# Patient Record
Sex: MALE | Race: WHITE | NOT HISPANIC OR LATINO | Employment: OTHER | URBAN - METROPOLITAN AREA
[De-identification: names, ages, dates, MRNs, and addresses within clinical notes are randomized per-mention and may not be internally consistent; named-entity substitution may affect disease eponyms.]

---

## 2020-01-01 ENCOUNTER — HOSPITAL ENCOUNTER (OUTPATIENT)
Dept: RADIATION ONCOLOGY | Facility: HOSPITAL | Age: 60
Discharge: HOME | End: 2020-10-27

## 2020-01-01 ENCOUNTER — TELEPHONE (OUTPATIENT)
Dept: ONCOLOGY | Facility: CLINIC | Age: 60
End: 2020-01-01

## 2020-01-01 ENCOUNTER — APPOINTMENT (OUTPATIENT)
Dept: LAB | Facility: HOSPITAL | Age: 60
End: 2020-01-01

## 2020-01-01 ENCOUNTER — CONSULT (OUTPATIENT)
Dept: RADIATION ONCOLOGY | Facility: HOSPITAL | Age: 60
End: 2020-01-01

## 2020-01-01 ENCOUNTER — OFFICE VISIT (OUTPATIENT)
Dept: ONCOLOGY | Facility: CLINIC | Age: 60
End: 2020-01-01

## 2020-01-01 ENCOUNTER — DOCUMENTATION (OUTPATIENT)
Dept: ONCOLOGY | Facility: CLINIC | Age: 60
End: 2020-01-01

## 2020-01-01 ENCOUNTER — OFFICE VISIT (OUTPATIENT)
Dept: RADIATION ONCOLOGY | Facility: HOSPITAL | Age: 60
End: 2020-01-01

## 2020-01-01 ENCOUNTER — HOSPITAL ENCOUNTER (OUTPATIENT)
Dept: RADIATION ONCOLOGY | Facility: HOSPITAL | Age: 60
Discharge: HOME | End: 2020-11-02

## 2020-01-01 ENCOUNTER — APPOINTMENT (OUTPATIENT)
Dept: GENERAL RADIOLOGY | Facility: HOSPITAL | Age: 60
End: 2020-01-01

## 2020-01-01 ENCOUNTER — APPOINTMENT (OUTPATIENT)
Dept: ONCOLOGY | Facility: HOSPITAL | Age: 60
End: 2020-01-01

## 2020-01-01 ENCOUNTER — ANESTHESIA (OUTPATIENT)
Dept: GASTROENTEROLOGY | Facility: HOSPITAL | Age: 60
End: 2020-01-01

## 2020-01-01 ENCOUNTER — TELEPHONE (OUTPATIENT)
Dept: RADIATION ONCOLOGY | Facility: HOSPITAL | Age: 60
End: 2020-01-01

## 2020-01-01 ENCOUNTER — OFFICE VISIT (OUTPATIENT)
Dept: SURGERY | Facility: CLINIC | Age: 60
End: 2020-01-01

## 2020-01-01 ENCOUNTER — DOCUMENTATION (OUTPATIENT)
Dept: ONCOLOGY | Facility: HOSPITAL | Age: 60
End: 2020-01-01

## 2020-01-01 ENCOUNTER — HOSPITAL ENCOUNTER (OUTPATIENT)
Dept: ONCOLOGY | Facility: HOSPITAL | Age: 60
Setting detail: INFUSION SERIES
Discharge: HOME | End: 2020-12-08

## 2020-01-01 ENCOUNTER — HOSPITAL ENCOUNTER (OUTPATIENT)
Dept: ONCOLOGY | Facility: HOSPITAL | Age: 60
Setting detail: INFUSION SERIES
Discharge: HOME | End: 2020-07-31

## 2020-01-01 ENCOUNTER — HOSPITAL ENCOUNTER (OUTPATIENT)
Dept: RADIATION ONCOLOGY | Facility: HOSPITAL | Age: 60
Discharge: HOME | End: 2020-11-10

## 2020-01-01 ENCOUNTER — HOSPITAL ENCOUNTER (OUTPATIENT)
Dept: ONCOLOGY | Facility: HOSPITAL | Age: 60
Setting detail: INFUSION SERIES
Discharge: HOME | End: 2020-12-16

## 2020-01-01 ENCOUNTER — APPOINTMENT (OUTPATIENT)
Dept: OTHER | Facility: HOSPITAL | Age: 60
End: 2020-01-01

## 2020-01-01 ENCOUNTER — HOSPITAL ENCOUNTER (OUTPATIENT)
Dept: ONCOLOGY | Facility: HOSPITAL | Age: 60
Setting detail: INFUSION SERIES
Discharge: HOME | End: 2020-09-23

## 2020-01-01 ENCOUNTER — HOSPITAL ENCOUNTER (OUTPATIENT)
Dept: RADIATION ONCOLOGY | Facility: HOSPITAL | Age: 60
Discharge: HOME | End: 2020-11-17

## 2020-01-01 ENCOUNTER — HOSPITAL ENCOUNTER (OUTPATIENT)
Facility: HOSPITAL | Age: 60
Setting detail: HOSPITAL OUTPATIENT SURGERY
Discharge: HOME | End: 2020-06-09
Attending: INTERNAL MEDICINE | Admitting: INTERNAL MEDICINE

## 2020-01-01 ENCOUNTER — HOSPITAL ENCOUNTER (OUTPATIENT)
Dept: ONCOLOGY | Facility: HOSPITAL | Age: 60
Setting detail: INFUSION SERIES
Discharge: HOME | End: 2020-07-15

## 2020-01-01 ENCOUNTER — HOSPITAL ENCOUNTER (OUTPATIENT)
Dept: ONCOLOGY | Facility: HOSPITAL | Age: 60
Setting detail: INFUSION SERIES
Discharge: HOME | End: 2020-11-18

## 2020-01-01 ENCOUNTER — HOSPITAL ENCOUNTER (OUTPATIENT)
Dept: MRI IMAGING | Facility: HOSPITAL | Age: 60
Discharge: HOME | End: 2020-05-29
Admitting: INTERNAL MEDICINE

## 2020-01-01 ENCOUNTER — HOSPITAL ENCOUNTER (OUTPATIENT)
Dept: ONCOLOGY | Facility: HOSPITAL | Age: 60
Setting detail: INFUSION SERIES
Discharge: HOME | End: 2020-08-20

## 2020-01-01 ENCOUNTER — HOSPITAL ENCOUNTER (OUTPATIENT)
Dept: ONCOLOGY | Facility: HOSPITAL | Age: 60
Setting detail: INFUSION SERIES
Discharge: HOME | End: 2020-09-03

## 2020-01-01 ENCOUNTER — HOSPITAL ENCOUNTER (OUTPATIENT)
Dept: RADIATION ONCOLOGY | Facility: HOSPITAL | Age: 60
Discharge: HOME | End: 2020-11-09

## 2020-01-01 ENCOUNTER — LAB (OUTPATIENT)
Dept: LAB | Facility: HOSPITAL | Age: 60
End: 2020-01-01

## 2020-01-01 ENCOUNTER — HOSPITAL ENCOUNTER (OUTPATIENT)
Dept: ONCOLOGY | Facility: HOSPITAL | Age: 60
Setting detail: INFUSION SERIES
Discharge: HOME | End: 2020-09-04

## 2020-01-01 ENCOUNTER — CONSULT (OUTPATIENT)
Dept: ONCOLOGY | Facility: CLINIC | Age: 60
End: 2020-01-01

## 2020-01-01 ENCOUNTER — HOSPITAL ENCOUNTER (OUTPATIENT)
Dept: ONCOLOGY | Facility: HOSPITAL | Age: 60
Setting detail: INFUSION SERIES
Discharge: HOME | End: 2020-07-14

## 2020-01-01 ENCOUNTER — HOSPITAL ENCOUNTER (OUTPATIENT)
Dept: PET IMAGING | Facility: HOSPITAL | Age: 60
End: 2020-01-01

## 2020-01-01 ENCOUNTER — ANESTHESIA EVENT (OUTPATIENT)
Dept: PERIOP | Facility: HOSPITAL | Age: 60
End: 2020-01-01

## 2020-01-01 ENCOUNTER — ANESTHESIA (OUTPATIENT)
Dept: PERIOP | Facility: HOSPITAL | Age: 60
End: 2020-01-01

## 2020-01-01 ENCOUNTER — HOSPITAL ENCOUNTER (OUTPATIENT)
Dept: ONCOLOGY | Facility: HOSPITAL | Age: 60
Setting detail: INFUSION SERIES
Discharge: HOME | End: 2020-12-30

## 2020-01-01 ENCOUNTER — HOSPITAL ENCOUNTER (OUTPATIENT)
Dept: ONCOLOGY | Facility: HOSPITAL | Age: 60
Setting detail: INFUSION SERIES
Discharge: HOME | End: 2020-12-09

## 2020-01-01 ENCOUNTER — HOSPITAL ENCOUNTER (OUTPATIENT)
Dept: ONCOLOGY | Facility: HOSPITAL | Age: 60
Setting detail: INFUSION SERIES
Discharge: HOME | End: 2020-09-22

## 2020-01-01 ENCOUNTER — ANESTHESIA EVENT (OUTPATIENT)
Dept: GASTROENTEROLOGY | Facility: HOSPITAL | Age: 60
End: 2020-01-01

## 2020-01-01 ENCOUNTER — HOSPITAL ENCOUNTER (OUTPATIENT)
Dept: ONCOLOGY | Facility: HOSPITAL | Age: 60
Setting detail: INFUSION SERIES
Discharge: HOME | End: 2020-08-21

## 2020-01-01 ENCOUNTER — HOSPITAL ENCOUNTER (OUTPATIENT)
Dept: ONCOLOGY | Facility: HOSPITAL | Age: 60
Setting detail: INFUSION SERIES
Discharge: HOME | End: 2020-07-01

## 2020-01-01 ENCOUNTER — HOSPITAL ENCOUNTER (OUTPATIENT)
Dept: ONCOLOGY | Facility: HOSPITAL | Age: 60
Setting detail: INFUSION SERIES
Discharge: HOME | End: 2020-07-30

## 2020-01-01 ENCOUNTER — HOSPITAL ENCOUNTER (OUTPATIENT)
Dept: RADIATION ONCOLOGY | Facility: HOSPITAL | Age: 60
Discharge: HOME | End: 2020-11-12

## 2020-01-01 ENCOUNTER — HOSPITAL ENCOUNTER (OUTPATIENT)
Dept: RADIATION ONCOLOGY | Facility: HOSPITAL | Age: 60
Discharge: HOME | End: 2020-10-28

## 2020-01-01 ENCOUNTER — TELEPHONE (OUTPATIENT)
Dept: ONCOLOGY | Facility: HOSPITAL | Age: 60
End: 2020-01-01

## 2020-01-01 ENCOUNTER — PREP FOR SURGERY (OUTPATIENT)
Dept: OTHER | Facility: HOSPITAL | Age: 60
End: 2020-01-01

## 2020-01-01 ENCOUNTER — RESULTS ENCOUNTER (OUTPATIENT)
Dept: ONCOLOGY | Facility: CLINIC | Age: 60
End: 2020-01-01

## 2020-01-01 ENCOUNTER — HOSPITAL ENCOUNTER (OUTPATIENT)
Dept: RADIATION ONCOLOGY | Facility: HOSPITAL | Age: 60
Discharge: HOME | End: 2020-11-06

## 2020-01-01 ENCOUNTER — HOSPITAL ENCOUNTER (OUTPATIENT)
Facility: HOSPITAL | Age: 60
Setting detail: HOSPITAL OUTPATIENT SURGERY
Discharge: HOME | End: 2020-06-15
Attending: THORACIC SURGERY (CARDIOTHORACIC VASCULAR SURGERY) | Admitting: THORACIC SURGERY (CARDIOTHORACIC VASCULAR SURGERY)

## 2020-01-01 ENCOUNTER — HOSPITAL ENCOUNTER (OUTPATIENT)
Dept: RADIATION ONCOLOGY | Facility: HOSPITAL | Age: 60
Discharge: HOME | End: 2020-11-11

## 2020-01-01 ENCOUNTER — HOSPITAL ENCOUNTER (OUTPATIENT)
Dept: PET IMAGING | Facility: HOSPITAL | Age: 60
Discharge: HOME | End: 2020-10-19
Admitting: INTERNAL MEDICINE

## 2020-01-01 ENCOUNTER — HOSPITAL ENCOUNTER (OUTPATIENT)
Dept: ONCOLOGY | Facility: HOSPITAL | Age: 60
Setting detail: INFUSION SERIES
End: 2020-01-01

## 2020-01-01 ENCOUNTER — HOSPITAL ENCOUNTER (OUTPATIENT)
Dept: ONCOLOGY | Facility: HOSPITAL | Age: 60
Setting detail: INFUSION SERIES
Discharge: HOME | End: 2020-09-17

## 2020-01-01 ENCOUNTER — HOSPITAL ENCOUNTER (OUTPATIENT)
Dept: RADIATION ONCOLOGY | Facility: HOSPITAL | Age: 60
Discharge: HOME | End: 2020-10-23

## 2020-01-01 ENCOUNTER — HOSPITAL ENCOUNTER (OUTPATIENT)
Dept: RADIATION ONCOLOGY | Facility: HOSPITAL | Age: 60
Setting detail: RADIATION/ONCOLOGY SERIES
End: 2020-01-01

## 2020-01-01 ENCOUNTER — HOSPITAL ENCOUNTER (OUTPATIENT)
Dept: ONCOLOGY | Facility: HOSPITAL | Age: 60
Setting detail: INFUSION SERIES
Discharge: HOME | End: 2020-06-30

## 2020-01-01 ENCOUNTER — HOSPITAL ENCOUNTER (OUTPATIENT)
Dept: RADIATION ONCOLOGY | Facility: HOSPITAL | Age: 60
Discharge: HOME | End: 2020-11-03

## 2020-01-01 ENCOUNTER — HOSPITAL ENCOUNTER (OUTPATIENT)
Dept: RADIATION ONCOLOGY | Facility: HOSPITAL | Age: 60
Discharge: HOME | End: 2020-11-13

## 2020-01-01 ENCOUNTER — HOSPITAL ENCOUNTER (OUTPATIENT)
Dept: RADIATION ONCOLOGY | Facility: HOSPITAL | Age: 60
Discharge: HOME | End: 2020-10-29

## 2020-01-01 ENCOUNTER — HOSPITAL ENCOUNTER (OUTPATIENT)
Dept: PET IMAGING | Facility: HOSPITAL | Age: 60
Discharge: HOME | End: 2020-05-26
Admitting: INTERNAL MEDICINE

## 2020-01-01 ENCOUNTER — APPOINTMENT (OUTPATIENT)
Dept: ONCOLOGY | Facility: CLINIC | Age: 60
End: 2020-01-01

## 2020-01-01 ENCOUNTER — HOSPITAL ENCOUNTER (OUTPATIENT)
Dept: ONCOLOGY | Facility: HOSPITAL | Age: 60
Setting detail: INFUSION SERIES
Discharge: HOME | End: 2020-12-23

## 2020-01-01 ENCOUNTER — HOSPITAL ENCOUNTER (OUTPATIENT)
Dept: CT IMAGING | Facility: HOSPITAL | Age: 60
Discharge: HOME | End: 2020-07-20

## 2020-01-01 ENCOUNTER — HOSPITAL ENCOUNTER (OUTPATIENT)
Dept: RADIATION ONCOLOGY | Facility: HOSPITAL | Age: 60
Discharge: HOME | End: 2020-11-05

## 2020-01-01 VITALS
TEMPERATURE: 97.3 F | DIASTOLIC BLOOD PRESSURE: 75 MMHG | WEIGHT: 168.4 LBS | BODY MASS INDEX: 24.11 KG/M2 | SYSTOLIC BLOOD PRESSURE: 121 MMHG | HEART RATE: 73 BPM | HEIGHT: 70 IN | RESPIRATION RATE: 18 BRPM

## 2020-01-01 VITALS
OXYGEN SATURATION: 99 % | DIASTOLIC BLOOD PRESSURE: 88 MMHG | RESPIRATION RATE: 17 BRPM | TEMPERATURE: 98.6 F | WEIGHT: 181 LBS | SYSTOLIC BLOOD PRESSURE: 127 MMHG | BODY MASS INDEX: 25.91 KG/M2 | HEIGHT: 70 IN | HEART RATE: 70 BPM

## 2020-01-01 VITALS
DIASTOLIC BLOOD PRESSURE: 83 MMHG | OXYGEN SATURATION: 98 % | WEIGHT: 176.3 LBS | BODY MASS INDEX: 25.3 KG/M2 | TEMPERATURE: 98.4 F | RESPIRATION RATE: 16 BRPM | SYSTOLIC BLOOD PRESSURE: 128 MMHG | HEART RATE: 88 BPM

## 2020-01-01 VITALS
HEIGHT: 70 IN | RESPIRATION RATE: 18 BRPM | DIASTOLIC BLOOD PRESSURE: 90 MMHG | WEIGHT: 197.6 LBS | TEMPERATURE: 97.7 F | BODY MASS INDEX: 28.29 KG/M2 | HEART RATE: 91 BPM | SYSTOLIC BLOOD PRESSURE: 165 MMHG | HEIGHT: 70 IN | TEMPERATURE: 97.1 F | WEIGHT: 188.4 LBS | DIASTOLIC BLOOD PRESSURE: 77 MMHG | BODY MASS INDEX: 26.97 KG/M2 | RESPIRATION RATE: 18 BRPM | SYSTOLIC BLOOD PRESSURE: 143 MMHG | HEART RATE: 102 BPM

## 2020-01-01 VITALS
WEIGHT: 128.2 LBS | DIASTOLIC BLOOD PRESSURE: 69 MMHG | HEART RATE: 57 BPM | RESPIRATION RATE: 16 BRPM | SYSTOLIC BLOOD PRESSURE: 145 MMHG | OXYGEN SATURATION: 99 % | HEIGHT: 70 IN | TEMPERATURE: 98 F | TEMPERATURE: 97.3 F | BODY MASS INDEX: 24.34 KG/M2 | WEIGHT: 170 LBS | DIASTOLIC BLOOD PRESSURE: 84 MMHG | RESPIRATION RATE: 18 BRPM | HEIGHT: 70 IN | BODY MASS INDEX: 18.35 KG/M2 | HEART RATE: 106 BPM | SYSTOLIC BLOOD PRESSURE: 95 MMHG

## 2020-01-01 VITALS
WEIGHT: 176.4 LBS | SYSTOLIC BLOOD PRESSURE: 119 MMHG | TEMPERATURE: 98 F | DIASTOLIC BLOOD PRESSURE: 82 MMHG | BODY MASS INDEX: 25.25 KG/M2 | HEIGHT: 70 IN | HEART RATE: 83 BPM

## 2020-01-01 VITALS
HEIGHT: 70 IN | HEART RATE: 57 BPM | SYSTOLIC BLOOD PRESSURE: 124 MMHG | BODY MASS INDEX: 24.31 KG/M2 | DIASTOLIC BLOOD PRESSURE: 79 MMHG | RESPIRATION RATE: 18 BRPM | TEMPERATURE: 99.1 F

## 2020-01-01 VITALS
HEIGHT: 70 IN | TEMPERATURE: 98.7 F | HEART RATE: 85 BPM | RESPIRATION RATE: 18 BRPM | BODY MASS INDEX: 25.65 KG/M2 | DIASTOLIC BLOOD PRESSURE: 78 MMHG | SYSTOLIC BLOOD PRESSURE: 131 MMHG | WEIGHT: 179.2 LBS

## 2020-01-01 VITALS
BODY MASS INDEX: 24.34 KG/M2 | TEMPERATURE: 97.3 F | SYSTOLIC BLOOD PRESSURE: 126 MMHG | DIASTOLIC BLOOD PRESSURE: 81 MMHG | RESPIRATION RATE: 20 BRPM | HEART RATE: 73 BPM | WEIGHT: 170 LBS | HEIGHT: 70 IN

## 2020-01-01 VITALS
BODY MASS INDEX: 17.78 KG/M2 | WEIGHT: 124.2 LBS | HEIGHT: 70 IN | SYSTOLIC BLOOD PRESSURE: 125 MMHG | HEART RATE: 77 BPM | TEMPERATURE: 98.4 F | DIASTOLIC BLOOD PRESSURE: 87 MMHG

## 2020-01-01 VITALS
SYSTOLIC BLOOD PRESSURE: 132 MMHG | HEART RATE: 71 BPM | TEMPERATURE: 98 F | WEIGHT: 174 LBS | DIASTOLIC BLOOD PRESSURE: 81 MMHG | RESPIRATION RATE: 16 BRPM | HEIGHT: 70 IN | BODY MASS INDEX: 24.91 KG/M2

## 2020-01-01 VITALS
DIASTOLIC BLOOD PRESSURE: 67 MMHG | RESPIRATION RATE: 18 BRPM | WEIGHT: 132.3 LBS | HEART RATE: 80 BPM | HEIGHT: 70 IN | TEMPERATURE: 98.2 F | BODY MASS INDEX: 18.94 KG/M2 | OXYGEN SATURATION: 97 % | SYSTOLIC BLOOD PRESSURE: 94 MMHG

## 2020-01-01 VITALS
DIASTOLIC BLOOD PRESSURE: 79 MMHG | HEART RATE: 83 BPM | RESPIRATION RATE: 18 BRPM | TEMPERATURE: 98.2 F | BODY MASS INDEX: 25.77 KG/M2 | HEIGHT: 70 IN | WEIGHT: 180 LBS | SYSTOLIC BLOOD PRESSURE: 128 MMHG

## 2020-01-01 VITALS
TEMPERATURE: 98.2 F | SYSTOLIC BLOOD PRESSURE: 138 MMHG | HEART RATE: 87 BPM | RESPIRATION RATE: 18 BRPM | DIASTOLIC BLOOD PRESSURE: 88 MMHG

## 2020-01-01 VITALS
HEART RATE: 85 BPM | SYSTOLIC BLOOD PRESSURE: 109 MMHG | BODY MASS INDEX: 19.2 KG/M2 | WEIGHT: 133.8 LBS | RESPIRATION RATE: 18 BRPM | TEMPERATURE: 98.9 F | DIASTOLIC BLOOD PRESSURE: 73 MMHG

## 2020-01-01 VITALS
BODY MASS INDEX: 24.11 KG/M2 | DIASTOLIC BLOOD PRESSURE: 87 MMHG | SYSTOLIC BLOOD PRESSURE: 145 MMHG | WEIGHT: 168.4 LBS | HEART RATE: 64 BPM | HEART RATE: 57 BPM | TEMPERATURE: 97.3 F | SYSTOLIC BLOOD PRESSURE: 138 MMHG | RESPIRATION RATE: 18 BRPM | HEIGHT: 70 IN | RESPIRATION RATE: 18 BRPM | HEIGHT: 70 IN | DIASTOLIC BLOOD PRESSURE: 84 MMHG | WEIGHT: 170.7 LBS | BODY MASS INDEX: 24.44 KG/M2 | TEMPERATURE: 97.1 F

## 2020-01-01 VITALS
TEMPERATURE: 98.4 F | WEIGHT: 133.3 LBS | HEART RATE: 94 BPM | BODY MASS INDEX: 19.08 KG/M2 | OXYGEN SATURATION: 97 % | DIASTOLIC BLOOD PRESSURE: 69 MMHG | HEIGHT: 70 IN | SYSTOLIC BLOOD PRESSURE: 106 MMHG | RESPIRATION RATE: 20 BRPM

## 2020-01-01 VITALS
HEART RATE: 90 BPM | SYSTOLIC BLOOD PRESSURE: 104 MMHG | RESPIRATION RATE: 20 BRPM | DIASTOLIC BLOOD PRESSURE: 74 MMHG | HEIGHT: 70 IN | WEIGHT: 135.6 LBS | BODY MASS INDEX: 19.41 KG/M2 | TEMPERATURE: 97.5 F

## 2020-01-01 VITALS
SYSTOLIC BLOOD PRESSURE: 124 MMHG | TEMPERATURE: 97.8 F | RESPIRATION RATE: 16 BRPM | DIASTOLIC BLOOD PRESSURE: 80 MMHG | BODY MASS INDEX: 24.34 KG/M2 | WEIGHT: 170 LBS | HEART RATE: 71 BPM | HEIGHT: 70 IN

## 2020-01-01 VITALS
RESPIRATION RATE: 16 BRPM | SYSTOLIC BLOOD PRESSURE: 101 MMHG | HEART RATE: 86 BPM | WEIGHT: 154 LBS | TEMPERATURE: 99.1 F | DIASTOLIC BLOOD PRESSURE: 59 MMHG | BODY MASS INDEX: 22.05 KG/M2 | HEIGHT: 70 IN

## 2020-01-01 VITALS
HEIGHT: 70 IN | WEIGHT: 172 LBS | TEMPERATURE: 97.7 F | DIASTOLIC BLOOD PRESSURE: 86 MMHG | RESPIRATION RATE: 18 BRPM | BODY MASS INDEX: 24.62 KG/M2 | SYSTOLIC BLOOD PRESSURE: 142 MMHG | HEART RATE: 95 BPM

## 2020-01-01 VITALS
BODY MASS INDEX: 19.04 KG/M2 | DIASTOLIC BLOOD PRESSURE: 69 MMHG | WEIGHT: 133 LBS | TEMPERATURE: 98.4 F | RESPIRATION RATE: 18 BRPM | HEART RATE: 94 BPM | HEIGHT: 70 IN | SYSTOLIC BLOOD PRESSURE: 106 MMHG

## 2020-01-01 VITALS
DIASTOLIC BLOOD PRESSURE: 75 MMHG | OXYGEN SATURATION: 99 % | TEMPERATURE: 98.9 F | BODY MASS INDEX: 22.42 KG/M2 | WEIGHT: 156.6 LBS | SYSTOLIC BLOOD PRESSURE: 112 MMHG | HEART RATE: 80 BPM | HEIGHT: 70 IN

## 2020-01-01 VITALS
TEMPERATURE: 97.5 F | SYSTOLIC BLOOD PRESSURE: 143 MMHG | HEART RATE: 64 BPM | RESPIRATION RATE: 14 BRPM | HEIGHT: 70 IN | OXYGEN SATURATION: 99 % | DIASTOLIC BLOOD PRESSURE: 86 MMHG | BODY MASS INDEX: 24.25 KG/M2 | WEIGHT: 169.4 LBS

## 2020-01-01 VITALS
WEIGHT: 123.6 LBS | DIASTOLIC BLOOD PRESSURE: 68 MMHG | SYSTOLIC BLOOD PRESSURE: 94 MMHG | RESPIRATION RATE: 18 BRPM | TEMPERATURE: 98.4 F | HEART RATE: 83 BPM | BODY MASS INDEX: 17.73 KG/M2

## 2020-01-01 VITALS
WEIGHT: 171 LBS | BODY MASS INDEX: 24.48 KG/M2 | HEIGHT: 70 IN | DIASTOLIC BLOOD PRESSURE: 79 MMHG | TEMPERATURE: 97.3 F | OXYGEN SATURATION: 99 % | SYSTOLIC BLOOD PRESSURE: 111 MMHG | HEART RATE: 93 BPM

## 2020-01-01 VITALS
DIASTOLIC BLOOD PRESSURE: 87 MMHG | OXYGEN SATURATION: 99 % | SYSTOLIC BLOOD PRESSURE: 142 MMHG | WEIGHT: 180.78 LBS | TEMPERATURE: 98.1 F | RESPIRATION RATE: 16 BRPM | HEIGHT: 70 IN | HEART RATE: 73 BPM | BODY MASS INDEX: 25.88 KG/M2

## 2020-01-01 VITALS
SYSTOLIC BLOOD PRESSURE: 165 MMHG | RESPIRATION RATE: 19 BRPM | TEMPERATURE: 97.1 F | HEART RATE: 91 BPM | OXYGEN SATURATION: 98 % | DIASTOLIC BLOOD PRESSURE: 90 MMHG | BODY MASS INDEX: 28.2 KG/M2 | WEIGHT: 197 LBS | HEIGHT: 70 IN

## 2020-01-01 VITALS
TEMPERATURE: 98.7 F | DIASTOLIC BLOOD PRESSURE: 78 MMHG | HEART RATE: 80 BPM | BODY MASS INDEX: 25.3 KG/M2 | RESPIRATION RATE: 18 BRPM | HEIGHT: 70 IN | SYSTOLIC BLOOD PRESSURE: 126 MMHG

## 2020-01-01 VITALS
DIASTOLIC BLOOD PRESSURE: 60 MMHG | RESPIRATION RATE: 18 BRPM | SYSTOLIC BLOOD PRESSURE: 95 MMHG | HEIGHT: 70 IN | BODY MASS INDEX: 20.7 KG/M2 | HEART RATE: 84 BPM | WEIGHT: 144.6 LBS | TEMPERATURE: 98.9 F

## 2020-01-01 VITALS
HEART RATE: 66 BPM | SYSTOLIC BLOOD PRESSURE: 133 MMHG | TEMPERATURE: 97.8 F | DIASTOLIC BLOOD PRESSURE: 80 MMHG | HEIGHT: 70 IN | BODY MASS INDEX: 24.16 KG/M2 | RESPIRATION RATE: 18 BRPM

## 2020-01-01 VITALS
TEMPERATURE: 97.1 F | HEIGHT: 70 IN | RESPIRATION RATE: 18 BRPM | SYSTOLIC BLOOD PRESSURE: 96 MMHG | WEIGHT: 123 LBS | DIASTOLIC BLOOD PRESSURE: 56 MMHG | BODY MASS INDEX: 17.61 KG/M2

## 2020-01-01 VITALS
SYSTOLIC BLOOD PRESSURE: 131 MMHG | BODY MASS INDEX: 28.27 KG/M2 | OXYGEN SATURATION: 97 % | HEIGHT: 70 IN | HEART RATE: 103 BPM | DIASTOLIC BLOOD PRESSURE: 81 MMHG

## 2020-01-01 VITALS
SYSTOLIC BLOOD PRESSURE: 127 MMHG | HEIGHT: 70 IN | WEIGHT: 137 LBS | RESPIRATION RATE: 18 BRPM | TEMPERATURE: 98.1 F | DIASTOLIC BLOOD PRESSURE: 97 MMHG | HEART RATE: 107 BPM | BODY MASS INDEX: 19.61 KG/M2

## 2020-01-01 DIAGNOSIS — R59.9 ADENOPATHY: ICD-10-CM

## 2020-01-01 DIAGNOSIS — I82.90 BLOOD CLOT IN VEIN: ICD-10-CM

## 2020-01-01 DIAGNOSIS — C15.9 MALIGNANT NEOPLASM OF ESOPHAGUS, UNSPECIFIED LOCATION (HCC): ICD-10-CM

## 2020-01-01 DIAGNOSIS — C15.5 MALIGNANT NEOPLASM OF LOWER THIRD OF ESOPHAGUS (HCC): Primary | ICD-10-CM

## 2020-01-01 DIAGNOSIS — D50.0 IRON DEFICIENCY ANEMIA DUE TO CHRONIC BLOOD LOSS: ICD-10-CM

## 2020-01-01 DIAGNOSIS — C15.5 MALIGNANT NEOPLASM OF LOWER THIRD OF ESOPHAGUS (HCC): ICD-10-CM

## 2020-01-01 DIAGNOSIS — C16.9 GASTRIC CARCINOMA (HCC): ICD-10-CM

## 2020-01-01 DIAGNOSIS — Z45.2 ENCOUNTER FOR FITTING AND ADJUSTMENT OF VASCULAR CATHETER: ICD-10-CM

## 2020-01-01 DIAGNOSIS — E86.0 DEHYDRATION: Primary | ICD-10-CM

## 2020-01-01 DIAGNOSIS — Z00.6 EXAMINATION FOR NORMAL COMPARISON OR CONTROL IN CLINICAL RESEARCH: ICD-10-CM

## 2020-01-01 DIAGNOSIS — C15.9 MALIGNANT NEOPLASM OF ESOPHAGUS, UNSPECIFIED LOCATION (HCC): Primary | ICD-10-CM

## 2020-01-01 DIAGNOSIS — T50.905A ADVERSE EFFECT OF DRUG, INITIAL ENCOUNTER: Primary | ICD-10-CM

## 2020-01-01 DIAGNOSIS — R11.2 CHEMOTHERAPY-INDUCED NAUSEA AND VOMITING: Primary | ICD-10-CM

## 2020-01-01 DIAGNOSIS — I10 ESSENTIAL HYPERTENSION: ICD-10-CM

## 2020-01-01 DIAGNOSIS — K21.00 GASTROESOPHAGEAL REFLUX DISEASE WITH ESOPHAGITIS WITHOUT HEMORRHAGE: Primary | ICD-10-CM

## 2020-01-01 DIAGNOSIS — D50.9 IRON DEFICIENCY ANEMIA, UNSPECIFIED IRON DEFICIENCY ANEMIA TYPE: ICD-10-CM

## 2020-01-01 DIAGNOSIS — R63.4 WEIGHT LOSS: ICD-10-CM

## 2020-01-01 DIAGNOSIS — R80.9 PROTEINURIA, UNSPECIFIED TYPE: Primary | ICD-10-CM

## 2020-01-01 DIAGNOSIS — C16.9 GASTRIC CARCINOMA (HCC): Primary | ICD-10-CM

## 2020-01-01 DIAGNOSIS — R63.4 WEIGHT LOSS: Primary | ICD-10-CM

## 2020-01-01 DIAGNOSIS — I26.99 PULMONARY EMBOLISM WITHOUT ACUTE COR PULMONALE, UNSPECIFIED CHRONICITY, UNSPECIFIED PULMONARY EMBOLISM TYPE (HCC): ICD-10-CM

## 2020-01-01 DIAGNOSIS — I82.90 BLOOD CLOT IN VEIN: Primary | ICD-10-CM

## 2020-01-01 DIAGNOSIS — K90.9 INTESTINAL MALABSORPTION, UNSPECIFIED TYPE: ICD-10-CM

## 2020-01-01 DIAGNOSIS — Z45.2 ENCOUNTER FOR FITTING AND ADJUSTMENT OF VASCULAR CATHETER: Primary | ICD-10-CM

## 2020-01-01 DIAGNOSIS — R11.2 NAUSEA AND VOMITING, INTRACTABILITY OF VOMITING NOT SPECIFIED, UNSPECIFIED VOMITING TYPE: Primary | ICD-10-CM

## 2020-01-01 DIAGNOSIS — R11.2 NAUSEA AND VOMITING, INTRACTABILITY OF VOMITING NOT SPECIFIED, UNSPECIFIED VOMITING TYPE: ICD-10-CM

## 2020-01-01 DIAGNOSIS — T45.1X5A CHEMOTHERAPY-INDUCED NAUSEA AND VOMITING: Primary | ICD-10-CM

## 2020-01-01 LAB
ALBUMIN SERPL-MCNC: 3.4 G/DL (ref 3.5–5.2)
ALBUMIN SERPL-MCNC: 3.5 G/DL (ref 3.5–5.2)
ALBUMIN SERPL-MCNC: 3.6 G/DL (ref 3.5–5.2)
ALBUMIN SERPL-MCNC: 3.7 G/DL (ref 3.5–5.2)
ALBUMIN SERPL-MCNC: 3.8 G/DL (ref 3.5–5.2)
ALBUMIN SERPL-MCNC: 3.8 G/DL (ref 3.5–5.2)
ALBUMIN SERPL-MCNC: 3.9 G/DL (ref 3.5–5.2)
ALBUMIN SERPL-MCNC: 4.1 G/DL (ref 3.5–5.2)
ALBUMIN SERPL-MCNC: 4.1 G/DL (ref 3.5–5.2)
ALBUMIN SERPL-MCNC: 4.2 G/DL (ref 3.5–5.2)
ALBUMIN/GLOB SERPL: 1.1 G/DL
ALBUMIN/GLOB SERPL: 1.2 G/DL
ALBUMIN/GLOB SERPL: 1.3 G/DL
ALBUMIN/GLOB SERPL: 1.3 G/DL
ALBUMIN/GLOB SERPL: 1.4 G/DL
ALBUMIN/GLOB SERPL: 1.5 G/DL
ALP SERPL-CCNC: 74 U/L (ref 39–117)
ALP SERPL-CCNC: 81 U/L (ref 39–117)
ALP SERPL-CCNC: 82 U/L (ref 39–117)
ALP SERPL-CCNC: 83 U/L (ref 39–117)
ALP SERPL-CCNC: 84 U/L (ref 39–117)
ALP SERPL-CCNC: 85 U/L (ref 39–117)
ALP SERPL-CCNC: 91 U/L (ref 39–117)
ALP SERPL-CCNC: 92 U/L (ref 39–117)
ALP SERPL-CCNC: 92 U/L (ref 39–117)
ALP SERPL-CCNC: 97 U/L (ref 39–117)
ALT SERPL W P-5'-P-CCNC: 10 U/L (ref 1–41)
ALT SERPL W P-5'-P-CCNC: 10 U/L (ref 1–41)
ALT SERPL W P-5'-P-CCNC: 15 U/L (ref 1–41)
ALT SERPL W P-5'-P-CCNC: 16 U/L (ref 1–41)
ALT SERPL W P-5'-P-CCNC: 17 U/L (ref 1–41)
ALT SERPL W P-5'-P-CCNC: 18 U/L (ref 1–41)
ALT SERPL W P-5'-P-CCNC: 18 U/L (ref 1–41)
ALT SERPL W P-5'-P-CCNC: 20 U/L (ref 1–41)
ALT SERPL W P-5'-P-CCNC: 22 U/L (ref 1–41)
ALT SERPL W P-5'-P-CCNC: 28 U/L (ref 1–41)
ANION GAP SERPL CALCULATED.3IONS-SCNC: 10 MMOL/L (ref 5–15)
ANION GAP SERPL CALCULATED.3IONS-SCNC: 11 MMOL/L (ref 5–15)
ANION GAP SERPL CALCULATED.3IONS-SCNC: 11 MMOL/L (ref 5–15)
ANION GAP SERPL CALCULATED.3IONS-SCNC: 12 MMOL/L (ref 5–15)
ANION GAP SERPL CALCULATED.3IONS-SCNC: 12 MMOL/L (ref 5–15)
ANION GAP SERPL CALCULATED.3IONS-SCNC: 13 MMOL/L (ref 5–15)
ANION GAP SERPL CALCULATED.3IONS-SCNC: 14 MMOL/L (ref 5–15)
ANION GAP SERPL CALCULATED.3IONS-SCNC: 14 MMOL/L (ref 5–15)
AST SERPL-CCNC: 15 U/L (ref 1–40)
AST SERPL-CCNC: 16 U/L (ref 1–40)
AST SERPL-CCNC: 18 U/L (ref 1–40)
AST SERPL-CCNC: 19 U/L (ref 1–40)
AST SERPL-CCNC: 20 U/L (ref 1–40)
AST SERPL-CCNC: 21 U/L (ref 1–40)
AST SERPL-CCNC: 23 U/L (ref 1–40)
AST SERPL-CCNC: 27 U/L (ref 1–40)
AST SERPL-CCNC: 28 U/L (ref 1–40)
AST SERPL-CCNC: 29 U/L (ref 1–40)
BASOPHILS # BLD AUTO: 0 10*3/MM3 (ref 0–0.2)
BASOPHILS # BLD AUTO: 0 10*3/MM3 (ref 0–0.2)
BASOPHILS # BLD AUTO: 0.01 10*3/MM3 (ref 0–0.2)
BASOPHILS # BLD AUTO: 0.02 10*3/MM3 (ref 0–0.2)
BASOPHILS # BLD AUTO: 0.03 10*3/MM3 (ref 0–0.2)
BASOPHILS # BLD AUTO: 0.04 10*3/MM3 (ref 0–0.2)
BASOPHILS # BLD AUTO: 0.04 10*3/MM3 (ref 0–0.2)
BASOPHILS NFR BLD AUTO: 0 % (ref 0–1.5)
BASOPHILS NFR BLD AUTO: 0 % (ref 0–1.5)
BASOPHILS NFR BLD AUTO: 0.1 % (ref 0–1.5)
BASOPHILS NFR BLD AUTO: 0.1 % (ref 0–1.5)
BASOPHILS NFR BLD AUTO: 0.2 % (ref 0–1.5)
BASOPHILS NFR BLD AUTO: 0.2 % (ref 0–1.5)
BASOPHILS NFR BLD AUTO: 0.4 % (ref 0–1.5)
BASOPHILS NFR BLD AUTO: 0.4 % (ref 0–1.5)
BASOPHILS NFR BLD AUTO: 0.5 % (ref 0–1.5)
BASOPHILS NFR BLD AUTO: 0.5 % (ref 0–1.5)
BASOPHILS NFR BLD AUTO: 0.7 % (ref 0–1.5)
BASOPHILS NFR BLD AUTO: 0.8 % (ref 0–1.5)
BASOPHILS NFR BLD AUTO: 0.8 % (ref 0–1.5)
BASOPHILS NFR BLD AUTO: 1.4 % (ref 0–1.5)
BASOPHILS NFR BLD AUTO: 1.6 % (ref 0–1.5)
BASOPHILS NFR BLD AUTO: 2.9 % (ref 0–1.5)
BILIRUB SERPL-MCNC: 0.3 MG/DL (ref 0.2–1.2)
BILIRUB SERPL-MCNC: 0.3 MG/DL (ref 0.2–1.2)
BILIRUB SERPL-MCNC: 0.3 MG/DL (ref 0–1.2)
BILIRUB SERPL-MCNC: 0.4 MG/DL (ref 0–1.2)
BILIRUB SERPL-MCNC: 0.5 MG/DL (ref 0–1.2)
BILIRUB UR QL STRIP: ABNORMAL
BUN BLD-MCNC: 11 MG/DL (ref 8–23)
BUN BLD-MCNC: 20 MG/DL (ref 8–23)
BUN BLD-MCNC: ABNORMAL MG/DL
BUN SERPL-MCNC: 10 MG/DL (ref 8–23)
BUN SERPL-MCNC: 12 MG/DL (ref 8–23)
BUN SERPL-MCNC: 13 MG/DL (ref 8–23)
BUN SERPL-MCNC: 14 MG/DL (ref 8–23)
BUN SERPL-MCNC: 15 MG/DL (ref 8–23)
BUN SERPL-MCNC: 26 MG/DL (ref 8–23)
BUN SERPL-MCNC: ABNORMAL MG/DL
BUN/CREAT SERPL: 14.3 (ref 7–25)
BUN/CREAT SERPL: 20.4 (ref 7–25)
BUN/CREAT SERPL: ABNORMAL
CALCIUM SPEC-SCNC: 9.1 MG/DL (ref 8.6–10.5)
CALCIUM SPEC-SCNC: 9.1 MG/DL (ref 8.6–10.5)
CALCIUM SPEC-SCNC: 9.2 MG/DL (ref 8.6–10.5)
CALCIUM SPEC-SCNC: 9.3 MG/DL (ref 8.6–10.5)
CALCIUM SPEC-SCNC: 9.4 MG/DL (ref 8.6–10.5)
CALCIUM SPEC-SCNC: 9.4 MG/DL (ref 8.6–10.5)
CALCIUM SPEC-SCNC: 9.5 MG/DL (ref 8.6–10.5)
CALCIUM SPEC-SCNC: 9.6 MG/DL (ref 8.6–10.5)
CALCIUM SPEC-SCNC: 9.7 MG/DL (ref 8.6–10.5)
CALCIUM SPEC-SCNC: 9.8 MG/DL (ref 8.6–10.5)
CALCIUM SPEC-SCNC: 9.9 MG/DL (ref 8.6–10.5)
CANCER AG19-9 SERPL-ACNC: 207.5 U/ML
CANCER AG19-9 SERPL-ACNC: 628.5 U/ML
CANCER AG19-9 SERPL-ACNC: 691.8 U/ML
CANCER AG19-9 SERPL-ACNC: 797.6 U/ML
CEA SERPL-MCNC: 13.3 NG/ML
CEA SERPL-MCNC: 15.1 NG/ML
CEA SERPL-MCNC: 16.4 NG/ML
CEA SERPL-MCNC: 20.6 NG/ML
CEA SERPL-MCNC: 4.44 NG/ML
CHLORIDE SERPL-SCNC: 100 MMOL/L (ref 98–107)
CHLORIDE SERPL-SCNC: 101 MMOL/L (ref 98–107)
CHLORIDE SERPL-SCNC: 102 MMOL/L (ref 98–107)
CHLORIDE SERPL-SCNC: 102 MMOL/L (ref 98–107)
CHLORIDE SERPL-SCNC: 103 MMOL/L (ref 98–107)
CHLORIDE SERPL-SCNC: 103 MMOL/L (ref 98–107)
CHLORIDE SERPL-SCNC: 104 MMOL/L (ref 98–107)
CHLORIDE SERPL-SCNC: 98 MMOL/L (ref 98–107)
CHLORIDE SERPL-SCNC: 99 MMOL/L (ref 98–107)
CLARITY UR: ABNORMAL
CO2 SERPL-SCNC: 21 MMOL/L (ref 22–29)
CO2 SERPL-SCNC: 22 MMOL/L (ref 22–29)
CO2 SERPL-SCNC: 24 MMOL/L (ref 22–29)
CO2 SERPL-SCNC: 25 MMOL/L (ref 22–29)
CO2 SERPL-SCNC: 25 MMOL/L (ref 22–29)
COLOR UR: ABNORMAL
CREAT BLD-MCNC: 0.83 MG/DL (ref 0.76–1.27)
CREAT BLD-MCNC: 0.98 MG/DL (ref 0.76–1.27)
CREAT SERPL-MCNC: 0.78 MG/DL (ref 0.76–1.27)
CREAT SERPL-MCNC: 0.8 MG/DL (ref 0.76–1.27)
CREAT SERPL-MCNC: 0.8 MG/DL (ref 0.76–1.27)
CREAT SERPL-MCNC: 0.81 MG/DL (ref 0.76–1.27)
CREAT SERPL-MCNC: 0.82 MG/DL (ref 0.76–1.27)
CREAT SERPL-MCNC: 0.86 MG/DL (ref 0.76–1.27)
CREAT SERPL-MCNC: 0.91 MG/DL (ref 0.76–1.27)
CREAT SERPL-MCNC: 1 MG/DL (ref 0.76–1.27)
CREAT SERPL-MCNC: 1.11 MG/DL (ref 0.76–1.27)
D DIMER PPP FEU-MCNC: 3.28 MCGFEU/ML (ref 0.17–0.59)
DEPRECATED RDW RBC AUTO: 40.3 FL (ref 37–54)
DEPRECATED RDW RBC AUTO: 40.7 FL (ref 37–54)
DEPRECATED RDW RBC AUTO: 40.9 FL (ref 37–54)
DEPRECATED RDW RBC AUTO: 41.7 FL (ref 37–54)
DEPRECATED RDW RBC AUTO: 41.9 FL (ref 37–54)
DEPRECATED RDW RBC AUTO: 42.1 FL (ref 37–54)
DEPRECATED RDW RBC AUTO: 42.5 FL (ref 37–54)
DEPRECATED RDW RBC AUTO: 42.7 FL (ref 37–54)
DEPRECATED RDW RBC AUTO: 42.9 FL (ref 37–54)
DEPRECATED RDW RBC AUTO: 43 FL (ref 37–54)
DEPRECATED RDW RBC AUTO: 44.8 FL (ref 37–54)
DEPRECATED RDW RBC AUTO: 46.6 FL (ref 37–54)
DEPRECATED RDW RBC AUTO: 48.2 FL (ref 37–54)
DEPRECATED RDW RBC AUTO: 48.5 FL (ref 37–54)
DEPRECATED RDW RBC AUTO: 49.6 FL (ref 37–54)
DEPRECATED RDW RBC AUTO: 50.5 FL (ref 37–54)
DEPRECATED RDW RBC AUTO: 52.1 FL (ref 37–54)
EOSINOPHIL # BLD AUTO: 0 10*3/MM3 (ref 0–0.4)
EOSINOPHIL # BLD AUTO: 0.01 10*3/MM3 (ref 0–0.4)
EOSINOPHIL # BLD AUTO: 0.02 10*3/MM3 (ref 0–0.4)
EOSINOPHIL # BLD AUTO: 0.02 10*3/MM3 (ref 0–0.4)
EOSINOPHIL # BLD AUTO: 0.04 10*3/MM3 (ref 0–0.4)
EOSINOPHIL # BLD AUTO: 0.05 10*3/MM3 (ref 0–0.4)
EOSINOPHIL # BLD AUTO: 0.06 10*3/MM3 (ref 0–0.4)
EOSINOPHIL # BLD AUTO: 0.07 10*3/MM3 (ref 0–0.4)
EOSINOPHIL NFR BLD AUTO: 0 % (ref 0.3–6.2)
EOSINOPHIL NFR BLD AUTO: 0.2 % (ref 0.3–6.2)
EOSINOPHIL NFR BLD AUTO: 0.3 % (ref 0.3–6.2)
EOSINOPHIL NFR BLD AUTO: 0.4 % (ref 0.3–6.2)
EOSINOPHIL NFR BLD AUTO: 0.6 % (ref 0.3–6.2)
EOSINOPHIL NFR BLD AUTO: 0.8 % (ref 0.3–6.2)
EOSINOPHIL NFR BLD AUTO: 1 % (ref 0.3–6.2)
EOSINOPHIL NFR BLD AUTO: 1.1 % (ref 0.3–6.2)
EOSINOPHIL NFR BLD AUTO: 1.4 % (ref 0.3–6.2)
EOSINOPHIL NFR BLD AUTO: 1.8 % (ref 0.3–6.2)
ERYTHROCYTE [DISTWIDTH] IN BLOOD BY AUTOMATED COUNT: 13 % (ref 12.3–15.4)
ERYTHROCYTE [DISTWIDTH] IN BLOOD BY AUTOMATED COUNT: 13.4 % (ref 12.3–15.4)
ERYTHROCYTE [DISTWIDTH] IN BLOOD BY AUTOMATED COUNT: 13.5 % (ref 12.3–15.4)
ERYTHROCYTE [DISTWIDTH] IN BLOOD BY AUTOMATED COUNT: 13.5 % (ref 12.3–15.4)
ERYTHROCYTE [DISTWIDTH] IN BLOOD BY AUTOMATED COUNT: 13.6 % (ref 12.3–15.4)
ERYTHROCYTE [DISTWIDTH] IN BLOOD BY AUTOMATED COUNT: 13.6 % (ref 12.3–15.4)
ERYTHROCYTE [DISTWIDTH] IN BLOOD BY AUTOMATED COUNT: 13.7 % (ref 12.3–15.4)
ERYTHROCYTE [DISTWIDTH] IN BLOOD BY AUTOMATED COUNT: 13.7 % (ref 12.3–15.4)
ERYTHROCYTE [DISTWIDTH] IN BLOOD BY AUTOMATED COUNT: 13.9 % (ref 12.3–15.4)
ERYTHROCYTE [DISTWIDTH] IN BLOOD BY AUTOMATED COUNT: 14.2 % (ref 12.3–15.4)
ERYTHROCYTE [DISTWIDTH] IN BLOOD BY AUTOMATED COUNT: 14.3 % (ref 12.3–15.4)
ERYTHROCYTE [DISTWIDTH] IN BLOOD BY AUTOMATED COUNT: 15.1 % (ref 12.3–15.4)
ERYTHROCYTE [DISTWIDTH] IN BLOOD BY AUTOMATED COUNT: 15.6 % (ref 12.3–15.4)
ERYTHROCYTE [DISTWIDTH] IN BLOOD BY AUTOMATED COUNT: 15.8 % (ref 12.3–15.4)
ERYTHROCYTE [DISTWIDTH] IN BLOOD BY AUTOMATED COUNT: 16.5 % (ref 12.3–15.4)
ERYTHROCYTE [DISTWIDTH] IN BLOOD BY AUTOMATED COUNT: 16.7 % (ref 12.3–15.4)
ERYTHROCYTE [DISTWIDTH] IN BLOOD BY AUTOMATED COUNT: 16.8 % (ref 12.3–15.4)
FERRITIN SERPL-MCNC: 104.7 NG/ML (ref 30–400)
GFR SERPL CREATININE-BSD FRML MDRD: 102 ML/MIN/1.73
GFR SERPL CREATININE-BSD FRML MDRD: 68 ML/MIN/1.73
GFR SERPL CREATININE-BSD FRML MDRD: 76 ML/MIN/1.73
GFR SERPL CREATININE-BSD FRML MDRD: 78 ML/MIN/1.73
GFR SERPL CREATININE-BSD FRML MDRD: 85 ML/MIN/1.73
GFR SERPL CREATININE-BSD FRML MDRD: 91 ML/MIN/1.73
GFR SERPL CREATININE-BSD FRML MDRD: 95 ML/MIN/1.73
GFR SERPL CREATININE-BSD FRML MDRD: 96 ML/MIN/1.73
GFR SERPL CREATININE-BSD FRML MDRD: 97 ML/MIN/1.73
GFR SERPL CREATININE-BSD FRML MDRD: 99 ML/MIN/1.73
GFR SERPL CREATININE-BSD FRML MDRD: 99 ML/MIN/1.73
GLOBULIN UR ELPH-MCNC: 2.3 GM/DL
GLOBULIN UR ELPH-MCNC: 2.5 GM/DL
GLOBULIN UR ELPH-MCNC: 2.6 GM/DL
GLOBULIN UR ELPH-MCNC: 2.6 GM/DL
GLOBULIN UR ELPH-MCNC: 2.7 GM/DL
GLOBULIN UR ELPH-MCNC: 2.8 GM/DL
GLOBULIN UR ELPH-MCNC: 3.1 GM/DL
GLOBULIN UR ELPH-MCNC: 3.2 GM/DL
GLOBULIN UR ELPH-MCNC: 3.3 GM/DL
GLOBULIN UR ELPH-MCNC: 3.7 GM/DL
GLUCOSE BLD-MCNC: 117 MG/DL (ref 65–99)
GLUCOSE BLD-MCNC: 132 MG/DL (ref 65–99)
GLUCOSE BLDC GLUCOMTR-MCNC: 117 MG/DL (ref 70–105)
GLUCOSE SERPL-MCNC: 106 MG/DL (ref 65–99)
GLUCOSE SERPL-MCNC: 141 MG/DL (ref 65–99)
GLUCOSE SERPL-MCNC: 166 MG/DL (ref 65–99)
GLUCOSE SERPL-MCNC: 170 MG/DL (ref 65–99)
GLUCOSE SERPL-MCNC: 170 MG/DL (ref 65–99)
GLUCOSE SERPL-MCNC: 180 MG/DL (ref 65–99)
GLUCOSE SERPL-MCNC: 203 MG/DL (ref 65–99)
GLUCOSE SERPL-MCNC: 222 MG/DL (ref 65–99)
GLUCOSE SERPL-MCNC: 230 MG/DL (ref 65–99)
GLUCOSE UR STRIP-MCNC: NEGATIVE MG/DL
HCT VFR BLD AUTO: 32.4 % (ref 37.5–51)
HCT VFR BLD AUTO: 32.8 % (ref 37.5–51)
HCT VFR BLD AUTO: 34 % (ref 37.5–51)
HCT VFR BLD AUTO: 34.1 % (ref 37.5–51)
HCT VFR BLD AUTO: 34.5 % (ref 37.5–51)
HCT VFR BLD AUTO: 34.6 % (ref 37.5–51)
HCT VFR BLD AUTO: 34.8 % (ref 37.5–51)
HCT VFR BLD AUTO: 35.1 % (ref 37.5–51)
HCT VFR BLD AUTO: 35.5 % (ref 37.5–51)
HCT VFR BLD AUTO: 35.9 % (ref 37.5–51)
HCT VFR BLD AUTO: 37 % (ref 37.5–51)
HCT VFR BLD AUTO: 37.8 % (ref 37.5–51)
HCT VFR BLD AUTO: 37.9 % (ref 37.5–51)
HCT VFR BLD AUTO: 38 % (ref 37.5–51)
HCT VFR BLD AUTO: 38.9 % (ref 37.5–51)
HCT VFR BLD AUTO: 39.6 % (ref 37.5–51)
HCT VFR BLD AUTO: 40.3 % (ref 37.5–51)
HGB BLD-MCNC: 11.3 G/DL (ref 13–17.7)
HGB BLD-MCNC: 11.4 G/DL (ref 13–17.7)
HGB BLD-MCNC: 11.5 G/DL (ref 13–17.7)
HGB BLD-MCNC: 11.6 G/DL (ref 13–17.7)
HGB BLD-MCNC: 11.8 G/DL (ref 13–17.7)
HGB BLD-MCNC: 11.8 G/DL (ref 13–17.7)
HGB BLD-MCNC: 11.9 G/DL (ref 13–17.7)
HGB BLD-MCNC: 11.9 G/DL (ref 13–17.7)
HGB BLD-MCNC: 12.2 G/DL (ref 13–17.7)
HGB BLD-MCNC: 12.3 G/DL (ref 13–17.7)
HGB BLD-MCNC: 12.5 G/DL (ref 13–17.7)
HGB BLD-MCNC: 12.8 G/DL (ref 13–17.7)
HGB BLD-MCNC: 13 G/DL (ref 13–17.7)
HGB BLD-MCNC: 13 G/DL (ref 13–17.7)
HGB BLD-MCNC: 13.1 G/DL (ref 13–17.7)
HGB BLD-MCNC: 13.4 G/DL (ref 13–17.7)
HGB BLD-MCNC: 13.6 G/DL (ref 13–17.7)
HGB UR QL STRIP.AUTO: ABNORMAL
HGB UR QL STRIP.AUTO: NEGATIVE
HGB UR QL STRIP.AUTO: NEGATIVE
IRON 24H UR-MRATE: 31 MCG/DL (ref 59–158)
IRON SATN MFR SERPL: 9 % (ref 20–50)
KETONES UR QL STRIP: ABNORMAL
LEUKOCYTE ESTERASE UR QL STRIP.AUTO: NEGATIVE
LYMPHOCYTES # BLD AUTO: 0.16 10*3/MM3 (ref 0.7–3.1)
LYMPHOCYTES # BLD AUTO: 0.2 10*3/MM3 (ref 0.7–3.1)
LYMPHOCYTES # BLD AUTO: 0.46 10*3/MM3 (ref 0.7–3.1)
LYMPHOCYTES # BLD AUTO: 0.47 10*3/MM3 (ref 0.7–3.1)
LYMPHOCYTES # BLD AUTO: 0.51 10*3/MM3 (ref 0.7–3.1)
LYMPHOCYTES # BLD AUTO: 0.53 10*3/MM3 (ref 0.7–3.1)
LYMPHOCYTES # BLD AUTO: 0.54 10*3/MM3 (ref 0.7–3.1)
LYMPHOCYTES # BLD AUTO: 0.54 10*3/MM3 (ref 0.7–3.1)
LYMPHOCYTES # BLD AUTO: 0.62 10*3/MM3 (ref 0.7–3.1)
LYMPHOCYTES # BLD AUTO: 0.74 10*3/MM3 (ref 0.7–3.1)
LYMPHOCYTES # BLD AUTO: 0.78 10*3/MM3 (ref 0.7–3.1)
LYMPHOCYTES # BLD AUTO: 0.89 10*3/MM3 (ref 0.7–3.1)
LYMPHOCYTES # BLD AUTO: 1.24 10*3/MM3 (ref 0.7–3.1)
LYMPHOCYTES # BLD AUTO: 1.35 10*3/MM3 (ref 0.7–3.1)
LYMPHOCYTES # BLD AUTO: 1.36 10*3/MM3 (ref 0.7–3.1)
LYMPHOCYTES # BLD AUTO: 1.39 10*3/MM3 (ref 0.7–3.1)
LYMPHOCYTES NFR BLD AUTO: 10 % (ref 19.6–45.3)
LYMPHOCYTES NFR BLD AUTO: 11 % (ref 19.6–45.3)
LYMPHOCYTES NFR BLD AUTO: 12.9 % (ref 19.6–45.3)
LYMPHOCYTES NFR BLD AUTO: 18.8 % (ref 19.6–45.3)
LYMPHOCYTES NFR BLD AUTO: 19 % (ref 19.6–45.3)
LYMPHOCYTES NFR BLD AUTO: 20.1 % (ref 19.6–45.3)
LYMPHOCYTES NFR BLD AUTO: 20.2 % (ref 19.6–45.3)
LYMPHOCYTES NFR BLD AUTO: 25.5 % (ref 19.6–45.3)
LYMPHOCYTES NFR BLD AUTO: 26.3 % (ref 19.6–45.3)
LYMPHOCYTES NFR BLD AUTO: 29.6 % (ref 19.6–45.3)
LYMPHOCYTES NFR BLD AUTO: 31.1 % (ref 19.6–45.3)
LYMPHOCYTES NFR BLD AUTO: 54.3 % (ref 19.6–45.3)
LYMPHOCYTES NFR BLD AUTO: 7.9 % (ref 19.6–45.3)
LYMPHOCYTES NFR BLD AUTO: 8.6 % (ref 19.6–45.3)
LYMPHOCYTES NFR BLD AUTO: 9.7 % (ref 19.6–45.3)
LYMPHOCYTES NFR BLD AUTO: 9.9 % (ref 19.6–45.3)
MCH RBC QN AUTO: 28.4 PG (ref 26.6–33)
MCH RBC QN AUTO: 28.5 PG (ref 26.6–33)
MCH RBC QN AUTO: 28.7 PG (ref 26.6–33)
MCH RBC QN AUTO: 28.9 PG (ref 26.6–33)
MCH RBC QN AUTO: 29 PG (ref 26.6–33)
MCH RBC QN AUTO: 29.1 PG (ref 26.6–33)
MCH RBC QN AUTO: 29.4 PG (ref 26.6–33)
MCH RBC QN AUTO: 29.6 PG (ref 26.6–33)
MCH RBC QN AUTO: 29.7 PG (ref 26.6–33)
MCH RBC QN AUTO: 30 PG (ref 26.6–33)
MCH RBC QN AUTO: 30.4 PG (ref 26.6–33)
MCH RBC QN AUTO: 30.7 PG (ref 26.6–33)
MCH RBC QN AUTO: 30.8 PG (ref 26.6–33)
MCH RBC QN AUTO: 30.8 PG (ref 26.6–33)
MCHC RBC AUTO-ENTMCNC: 33 G/DL (ref 31.5–35.7)
MCHC RBC AUTO-ENTMCNC: 33.2 G/DL (ref 31.5–35.7)
MCHC RBC AUTO-ENTMCNC: 33.4 G/DL (ref 31.5–35.7)
MCHC RBC AUTO-ENTMCNC: 33.6 G/DL (ref 31.5–35.7)
MCHC RBC AUTO-ENTMCNC: 33.7 G/DL (ref 31.5–35.7)
MCHC RBC AUTO-ENTMCNC: 33.8 G/DL (ref 31.5–35.7)
MCHC RBC AUTO-ENTMCNC: 33.9 G/DL (ref 31.5–35.7)
MCHC RBC AUTO-ENTMCNC: 33.9 G/DL (ref 31.5–35.7)
MCHC RBC AUTO-ENTMCNC: 34.3 G/DL (ref 31.5–35.7)
MCHC RBC AUTO-ENTMCNC: 34.4 G/DL (ref 31.5–35.7)
MCHC RBC AUTO-ENTMCNC: 34.4 G/DL (ref 31.5–35.7)
MCHC RBC AUTO-ENTMCNC: 34.6 G/DL (ref 31.5–35.7)
MCHC RBC AUTO-ENTMCNC: 34.6 G/DL (ref 31.5–35.7)
MCHC RBC AUTO-ENTMCNC: 34.7 G/DL (ref 31.5–35.7)
MCHC RBC AUTO-ENTMCNC: 34.8 G/DL (ref 31.5–35.7)
MCHC RBC AUTO-ENTMCNC: 34.8 G/DL (ref 31.5–35.7)
MCHC RBC AUTO-ENTMCNC: 34.9 G/DL (ref 31.5–35.7)
MCV RBC AUTO: 83 FL (ref 79–97)
MCV RBC AUTO: 84.2 FL (ref 79–97)
MCV RBC AUTO: 84.3 FL (ref 79–97)
MCV RBC AUTO: 84.6 FL (ref 79–97)
MCV RBC AUTO: 84.6 FL (ref 79–97)
MCV RBC AUTO: 85 FL (ref 79–97)
MCV RBC AUTO: 85.3 FL (ref 79–97)
MCV RBC AUTO: 85.3 FL (ref 79–97)
MCV RBC AUTO: 85.6 FL (ref 79–97)
MCV RBC AUTO: 85.6 FL (ref 79–97)
MCV RBC AUTO: 86.1 FL (ref 79–97)
MCV RBC AUTO: 87.2 FL (ref 79–97)
MCV RBC AUTO: 87.8 FL (ref 79–97)
MCV RBC AUTO: 88.4 FL (ref 79–97)
MCV RBC AUTO: 88.6 FL (ref 79–97)
MCV RBC AUTO: 88.8 FL (ref 79–97)
MCV RBC AUTO: 89.2 FL (ref 79–97)
MONOCYTES # BLD AUTO: 0.16 10*3/MM3 (ref 0.1–0.9)
MONOCYTES # BLD AUTO: 0.17 10*3/MM3 (ref 0.1–0.9)
MONOCYTES # BLD AUTO: 0.17 10*3/MM3 (ref 0.1–0.9)
MONOCYTES # BLD AUTO: 0.19 10*3/MM3 (ref 0.1–0.9)
MONOCYTES # BLD AUTO: 0.22 10*3/MM3 (ref 0.1–0.9)
MONOCYTES # BLD AUTO: 0.24 10*3/MM3 (ref 0.1–0.9)
MONOCYTES # BLD AUTO: 0.25 10*3/MM3 (ref 0.1–0.9)
MONOCYTES # BLD AUTO: 0.41 10*3/MM3 (ref 0.1–0.9)
MONOCYTES # BLD AUTO: 0.55 10*3/MM3 (ref 0.1–0.9)
MONOCYTES # BLD AUTO: 0.69 10*3/MM3 (ref 0.1–0.9)
MONOCYTES # BLD AUTO: 0.72 10*3/MM3 (ref 0.1–0.9)
MONOCYTES # BLD AUTO: 0.75 10*3/MM3 (ref 0.1–0.9)
MONOCYTES # BLD AUTO: 0.78 10*3/MM3 (ref 0.1–0.9)
MONOCYTES # BLD AUTO: 1.13 10*3/MM3 (ref 0.1–0.9)
MONOCYTES NFR BLD AUTO: 11.7 % (ref 5–12)
MONOCYTES NFR BLD AUTO: 12 % (ref 5–12)
MONOCYTES NFR BLD AUTO: 13.1 % (ref 5–12)
MONOCYTES NFR BLD AUTO: 14.6 % (ref 5–12)
MONOCYTES NFR BLD AUTO: 2.8 % (ref 5–12)
MONOCYTES NFR BLD AUTO: 21 % (ref 5–12)
MONOCYTES NFR BLD AUTO: 25.9 % (ref 5–12)
MONOCYTES NFR BLD AUTO: 27.2 % (ref 5–12)
MONOCYTES NFR BLD AUTO: 29.2 % (ref 5–12)
MONOCYTES NFR BLD AUTO: 3.3 % (ref 5–12)
MONOCYTES NFR BLD AUTO: 3.7 % (ref 5–12)
MONOCYTES NFR BLD AUTO: 4.1 % (ref 5–12)
MONOCYTES NFR BLD AUTO: 4.4 % (ref 5–12)
MONOCYTES NFR BLD AUTO: 6.3 % (ref 5–12)
MONOCYTES NFR BLD AUTO: 6.4 % (ref 5–12)
MONOCYTES NFR BLD AUTO: 8.8 % (ref 5–12)
NEUTROPHILS # BLD AUTO: 7.18 10*3/MM3 (ref 1.7–7)
NEUTROPHILS NFR BLD AUTO: 0.59 10*3/MM3 (ref 1.7–7)
NEUTROPHILS NFR BLD AUTO: 0.63 10*3/MM3 (ref 1.7–7)
NEUTROPHILS NFR BLD AUTO: 0.96 10*3/MM3 (ref 1.7–7)
NEUTROPHILS NFR BLD AUTO: 1.02 10*3/MM3 (ref 1.7–7)
NEUTROPHILS NFR BLD AUTO: 1.06 10*3/MM3 (ref 1.7–7)
NEUTROPHILS NFR BLD AUTO: 1.35 10*3/MM3 (ref 1.7–7)
NEUTROPHILS NFR BLD AUTO: 1.96 10*3/MM3 (ref 1.7–7)
NEUTROPHILS NFR BLD AUTO: 2.83 10*3/MM3 (ref 1.7–7)
NEUTROPHILS NFR BLD AUTO: 2.96 10*3/MM3 (ref 1.7–7)
NEUTROPHILS NFR BLD AUTO: 3.51 10*3/MM3 (ref 1.7–7)
NEUTROPHILS NFR BLD AUTO: 3.54 10*3/MM3 (ref 1.7–7)
NEUTROPHILS NFR BLD AUTO: 37.4 % (ref 42.7–76)
NEUTROPHILS NFR BLD AUTO: 4.81 10*3/MM3 (ref 1.7–7)
NEUTROPHILS NFR BLD AUTO: 41.7 % (ref 42.7–76)
NEUTROPHILS NFR BLD AUTO: 48.1 % (ref 42.7–76)
NEUTROPHILS NFR BLD AUTO: 5.47 10*3/MM3 (ref 1.7–7)
NEUTROPHILS NFR BLD AUTO: 50.6 % (ref 42.7–76)
NEUTROPHILS NFR BLD AUTO: 56.1 % (ref 42.7–76)
NEUTROPHILS NFR BLD AUTO: 6.51 10*3/MM3 (ref 1.7–7)
NEUTROPHILS NFR BLD AUTO: 64.5 % (ref 42.7–76)
NEUTROPHILS NFR BLD AUTO: 68.4 % (ref 42.7–76)
NEUTROPHILS NFR BLD AUTO: 72 % (ref 42.7–76)
NEUTROPHILS NFR BLD AUTO: 72.9 % (ref 42.7–76)
NEUTROPHILS NFR BLD AUTO: 73.1 % (ref 42.7–76)
NEUTROPHILS NFR BLD AUTO: 74.6 % (ref 42.7–76)
NEUTROPHILS NFR BLD AUTO: 75.1 % (ref 42.7–76)
NEUTROPHILS NFR BLD AUTO: 77.2 % (ref 42.7–76)
NEUTROPHILS NFR BLD AUTO: 85.6 % (ref 42.7–76)
NEUTROPHILS NFR BLD AUTO: 86.7 % (ref 42.7–76)
NEUTROPHILS NFR BLD AUTO: 88.4 % (ref 42.7–76)
NEUTROPHILS NFR BLD AUTO: 9.65 10*3/MM3 (ref 1.7–7)
NITRITE UR QL STRIP: NEGATIVE
PH UR STRIP.AUTO: 5.5 [PH] (ref 5–8)
PH UR STRIP.AUTO: 5.5 [PH] (ref 5–8)
PH UR STRIP.AUTO: 6 [PH] (ref 5–8)
PLATELET # BLD AUTO: 147 10*3/MM3 (ref 140–450)
PLATELET # BLD AUTO: 162 10*3/MM3 (ref 140–450)
PLATELET # BLD AUTO: 163 10*3/MM3 (ref 140–450)
PLATELET # BLD AUTO: 166 10*3/MM3 (ref 140–450)
PLATELET # BLD AUTO: 171 10*3/MM3 (ref 140–450)
PLATELET # BLD AUTO: 171 10*3/MM3 (ref 140–450)
PLATELET # BLD AUTO: 195 10*3/MM3 (ref 140–450)
PLATELET # BLD AUTO: 198 10*3/MM3 (ref 140–450)
PLATELET # BLD AUTO: 212 10*3/MM3 (ref 140–450)
PLATELET # BLD AUTO: 214 10*3/MM3 (ref 140–450)
PLATELET # BLD AUTO: 229 10*3/MM3 (ref 140–450)
PLATELET # BLD AUTO: 236 10*3/MM3 (ref 140–450)
PLATELET # BLD AUTO: 253 10*3/MM3 (ref 140–450)
PLATELET # BLD AUTO: 280 10*3/MM3 (ref 140–450)
PLATELET # BLD AUTO: 286 10*3/MM3 (ref 140–450)
PLATELET # BLD AUTO: 345 10*3/MM3 (ref 140–450)
PLATELET # BLD AUTO: 388 10*3/MM3 (ref 140–450)
PMV BLD AUTO: 10.3 FL (ref 6–12)
PMV BLD AUTO: 10.4 FL (ref 6–12)
PMV BLD AUTO: 10.6 FL (ref 6–12)
PMV BLD AUTO: 10.7 FL (ref 6–12)
PMV BLD AUTO: 10.8 FL (ref 6–12)
PMV BLD AUTO: 10.8 FL (ref 6–12)
PMV BLD AUTO: 10.9 FL (ref 6–12)
PMV BLD AUTO: 10.9 FL (ref 6–12)
PMV BLD AUTO: 11.2 FL (ref 6–12)
PMV BLD AUTO: 11.2 FL (ref 6–12)
PMV BLD AUTO: 8.2 FL (ref 6–12)
POTASSIUM BLD-SCNC: 3.9 MMOL/L (ref 3.5–5.2)
POTASSIUM BLD-SCNC: 4.2 MMOL/L (ref 3.5–5.2)
POTASSIUM SERPL-SCNC: 3.3 MMOL/L (ref 3.5–5.2)
POTASSIUM SERPL-SCNC: 4.1 MMOL/L (ref 3.5–5.2)
POTASSIUM SERPL-SCNC: 4.2 MMOL/L (ref 3.5–5.2)
POTASSIUM SERPL-SCNC: 4.2 MMOL/L (ref 3.5–5.2)
POTASSIUM SERPL-SCNC: 4.3 MMOL/L (ref 3.5–5.2)
POTASSIUM SERPL-SCNC: 4.5 MMOL/L (ref 3.5–5.2)
POTASSIUM SERPL-SCNC: 4.9 MMOL/L (ref 3.5–5.2)
PROT SERPL-MCNC: 5.8 G/DL (ref 6–8.5)
PROT SERPL-MCNC: 5.9 G/DL (ref 6–8.5)
PROT SERPL-MCNC: 6.2 G/DL (ref 6–8.5)
PROT SERPL-MCNC: 6.3 G/DL (ref 6–8.5)
PROT SERPL-MCNC: 6.6 G/DL (ref 6–8.5)
PROT SERPL-MCNC: 6.6 G/DL (ref 6–8.5)
PROT SERPL-MCNC: 7 G/DL (ref 6–8.5)
PROT SERPL-MCNC: 7.2 G/DL (ref 6–8.5)
PROT SERPL-MCNC: 7.5 G/DL (ref 6–8.5)
PROT SERPL-MCNC: 7.8 G/DL (ref 6–8.5)
PROT UR QL STRIP: ABNORMAL
RBC # BLD AUTO: 3.7 10*6/MM3 (ref 4.14–5.8)
RBC # BLD AUTO: 3.81 10*6/MM3 (ref 4.14–5.8)
RBC # BLD AUTO: 3.97 10*6/MM3 (ref 4.14–5.8)
RBC # BLD AUTO: 3.98 10*6/MM3 (ref 4.14–5.8)
RBC # BLD AUTO: 4.02 10*6/MM3 (ref 4.14–5.8)
RBC # BLD AUTO: 4.04 10*6/MM3 (ref 4.14–5.8)
RBC # BLD AUTO: 4.06 10*6/MM3 (ref 4.14–5.8)
RBC # BLD AUTO: 4.08 10*6/MM3 (ref 4.14–5.8)
RBC # BLD AUTO: 4.1 10*6/MM3 (ref 4.14–5.8)
RBC # BLD AUTO: 4.11 10*6/MM3 (ref 4.14–5.8)
RBC # BLD AUTO: 4.27 10*6/MM3 (ref 4.14–5.8)
RBC # BLD AUTO: 4.32 10*6/MM3 (ref 4.14–5.8)
RBC # BLD AUTO: 4.43 10*6/MM3 (ref 4.14–5.8)
RBC # BLD AUTO: 4.51 10*6/MM3 (ref 4.14–5.8)
RBC # BLD AUTO: 4.56 10*6/MM3 (ref 4.14–5.8)
RBC # BLD AUTO: 4.59 10*6/MM3 (ref 4.14–5.8)
RBC # BLD AUTO: 4.7 10*6/MM3 (ref 4.14–5.8)
REF LAB TEST METHOD: NORMAL
SARS-COV-2 RNA RESP QL NAA+PROBE: NOT DETECTED
SODIUM BLD-SCNC: 135 MMOL/L (ref 136–145)
SODIUM BLD-SCNC: 137 MMOL/L (ref 136–145)
SODIUM SERPL-SCNC: 134 MMOL/L (ref 136–145)
SODIUM SERPL-SCNC: 134 MMOL/L (ref 136–145)
SODIUM SERPL-SCNC: 136 MMOL/L (ref 136–145)
SODIUM SERPL-SCNC: 136 MMOL/L (ref 136–145)
SODIUM SERPL-SCNC: 137 MMOL/L (ref 136–145)
SODIUM SERPL-SCNC: 138 MMOL/L (ref 136–145)
SODIUM SERPL-SCNC: 138 MMOL/L (ref 136–145)
SODIUM SERPL-SCNC: 139 MMOL/L (ref 136–145)
SODIUM SERPL-SCNC: 139 MMOL/L (ref 136–145)
SP GR UR STRIP: 1.02 (ref 1–1.03)
SP GR UR STRIP: >=1.03 (ref 1–1.03)
SP GR UR STRIP: >=1.03 (ref 1–1.03)
T4 FREE SERPL-MCNC: 1.21 NG/DL (ref 0.93–1.7)
TIBC SERPL-MCNC: 359 MCG/DL (ref 298–536)
TRANSFERRIN SERPL-MCNC: 241 MG/DL (ref 200–360)
TSH SERPL DL<=0.05 MIU/L-ACNC: 0.86 UIU/ML (ref 0.27–4.2)
UROBILINOGEN UR QL STRIP: ABNORMAL
WBC # BLD AUTO: 1.05 10*3/MM3 (ref 3.4–10.8)
WBC # BLD AUTO: 1.45 10*3/MM3 (ref 3.4–10.8)
WBC # BLD AUTO: 1.51 10*3/MM3 (ref 3.4–10.8)
WBC # BLD AUTO: 11.27 10*3/MM3 (ref 3.4–10.8)
WBC # BLD AUTO: 2.12 10*3/MM3 (ref 3.4–10.8)
WBC # BLD AUTO: 2.56 10*3/MM3 (ref 3.4–10.8)
WBC # BLD AUTO: 2.67 10*3/MM3 (ref 3.4–10.8)
WBC # BLD AUTO: 2.72 10*3/MM3 (ref 3.4–10.8)
WBC # BLD AUTO: 3.88 10*3/MM3 (ref 3.4–10.8)
WBC # BLD AUTO: 4.59 10*3/MM3 (ref 3.4–10.8)
WBC # BLD AUTO: 4.72 10*3/MM3 (ref 3.4–10.8)
WBC # BLD AUTO: 5.13 10*3/MM3 (ref 3.4–10.8)
WBC # BLD AUTO: 6.18 10*3/MM3 (ref 3.4–10.8)
WBC # BLD AUTO: 6.23 10*3/MM3 (ref 3.4–10.8)
WBC # BLD AUTO: 7.51 10*3/MM3 (ref 3.4–10.8)
WBC NRBC COR # BLD: 7.8 10*3/MM3 (ref 3.4–10.8)
WBC NRBC COR # BLD: 9.63 10*3/MM3 (ref 3.4–10.8)

## 2020-01-01 PROCEDURE — 96415 CHEMO IV INFUSION ADDL HR: CPT

## 2020-01-01 PROCEDURE — 77014 CHG CT GUIDANCE RADIATION THERAPY FLDS PLACEMENT: CPT | Performed by: RADIOLOGY

## 2020-01-01 PROCEDURE — 99215 OFFICE O/P EST HI 40 MIN: CPT | Performed by: INTERNAL MEDICINE

## 2020-01-01 PROCEDURE — 77387 GUIDANCE FOR RADJ TX DLVR: CPT | Performed by: RADIOLOGY

## 2020-01-01 PROCEDURE — 25010000002 HYDROCORTISONE SODIUM SUCCINATE 100 MG RECONSTITUTED SOLUTION: Performed by: INTERNAL MEDICINE

## 2020-01-01 PROCEDURE — 96417 CHEMO IV INFUS EACH ADDL SEQ: CPT

## 2020-01-01 PROCEDURE — 25010000002 FLUOROURACIL PER 500 MG: Performed by: INTERNAL MEDICINE

## 2020-01-01 PROCEDURE — 25010000003 HEPARIN LOCK FLUSH PER 10 UNITS: Performed by: INTERNAL MEDICINE

## 2020-01-01 PROCEDURE — 77293 RESPIRATOR MOTION MGMT SIMUL: CPT | Performed by: RADIOLOGY

## 2020-01-01 PROCEDURE — 77300 RADIATION THERAPY DOSE PLAN: CPT | Performed by: RADIOLOGY

## 2020-01-01 PROCEDURE — 77336 RADIATION PHYSICS CONSULT: CPT | Performed by: RADIOLOGY

## 2020-01-01 PROCEDURE — 77295 3-D RADIOTHERAPY PLAN: CPT | Performed by: RADIOLOGY

## 2020-01-01 PROCEDURE — 82378 CARCINOEMBRYONIC ANTIGEN: CPT | Performed by: INTERNAL MEDICINE

## 2020-01-01 PROCEDURE — 96375 TX/PRO/DX INJ NEW DRUG ADDON: CPT

## 2020-01-01 PROCEDURE — 36591 DRAW BLOOD OFF VENOUS DEVICE: CPT

## 2020-01-01 PROCEDURE — 36415 COLL VENOUS BLD VENIPUNCTURE: CPT

## 2020-01-01 PROCEDURE — 96368 THER/DIAG CONCURRENT INF: CPT

## 2020-01-01 PROCEDURE — 96416 CHEMO PROLONG INFUSE W/PUMP: CPT

## 2020-01-01 PROCEDURE — 25010000002 PROPOFOL 10 MG/ML EMULSION: Performed by: ANESTHESIOLOGY

## 2020-01-01 PROCEDURE — 99024 POSTOP FOLLOW-UP VISIT: CPT | Performed by: RADIOLOGY

## 2020-01-01 PROCEDURE — 25010000002 DOCETAXEL 20 MG/ML SOLUTION 8 ML VIAL: Performed by: INTERNAL MEDICINE

## 2020-01-01 PROCEDURE — 25010000002 OXALIPLATIN PER 0.5 MG: Performed by: INTERNAL MEDICINE

## 2020-01-01 PROCEDURE — 80053 COMPREHEN METABOLIC PANEL: CPT | Performed by: INTERNAL MEDICINE

## 2020-01-01 PROCEDURE — G0498 CHEMO EXTEND IV INFUS W/PUMP: HCPCS

## 2020-01-01 PROCEDURE — 96413 CHEMO IV INFUSION 1 HR: CPT

## 2020-01-01 PROCEDURE — 86301 IMMUNOASSAY TUMOR CA 19-9: CPT | Performed by: INTERNAL MEDICINE

## 2020-01-01 PROCEDURE — 96361 HYDRATE IV INFUSION ADD-ON: CPT

## 2020-01-01 PROCEDURE — 25010000002 DIPHENHYDRAMINE PER 50 MG: Performed by: INTERNAL MEDICINE

## 2020-01-01 PROCEDURE — 85025 COMPLETE CBC W/AUTO DIFF WBC: CPT | Performed by: INTERNAL MEDICINE

## 2020-01-01 PROCEDURE — 25010000002 LEUCOVORIN 200 MG RECONSTITUTED SOLUTION 200 MG VIAL: Performed by: INTERNAL MEDICINE

## 2020-01-01 PROCEDURE — 77412 RADIATION TX DELIVERY LVL 3: CPT | Performed by: RADIOLOGY

## 2020-01-01 PROCEDURE — 99204 OFFICE O/P NEW MOD 45 MIN: CPT | Performed by: SURGERY

## 2020-01-01 PROCEDURE — 77334 RADIATION TREATMENT AID(S): CPT | Performed by: RADIOLOGY

## 2020-01-01 PROCEDURE — 0 FLUDEOXYGLUCOSE F18 SOLUTION: Performed by: INTERNAL MEDICINE

## 2020-01-01 PROCEDURE — 96523 IRRIG DRUG DELIVERY DEVICE: CPT

## 2020-01-01 PROCEDURE — 25010000002 FENTANYL CITRATE (PF) 100 MCG/2ML SOLUTION: Performed by: ANESTHESIOLOGY

## 2020-01-01 PROCEDURE — 25010000002 DEXAMETHASONE SODIUM PHOSPHATE 120 MG/30ML SOLUTION: Performed by: INTERNAL MEDICINE

## 2020-01-01 PROCEDURE — 85025 COMPLETE CBC W/AUTO DIFF WBC: CPT

## 2020-01-01 PROCEDURE — 77427 RADIATION TX MANAGEMENT X5: CPT | Performed by: RADIOLOGY

## 2020-01-01 PROCEDURE — 25010000002 PALONOSETRON 0.25 MG/5ML SOLUTION PREFILLED SYRINGE: Performed by: INTERNAL MEDICINE

## 2020-01-01 PROCEDURE — 25010000002 PACLITAXEL PER 30 MG: Performed by: INTERNAL MEDICINE

## 2020-01-01 PROCEDURE — 99214 OFFICE O/P EST MOD 30 MIN: CPT | Performed by: INTERNAL MEDICINE

## 2020-01-01 PROCEDURE — 85379 FIBRIN DEGRADATION QUANT: CPT | Performed by: INTERNAL MEDICINE

## 2020-01-01 PROCEDURE — 36561 INSERT TUNNELED CV CATH: CPT | Performed by: THORACIC SURGERY (CARDIOTHORACIC VASCULAR SURGERY)

## 2020-01-01 PROCEDURE — 25010000002 ONDANSETRON PER 1 MG: Performed by: ANESTHESIOLOGY

## 2020-01-01 PROCEDURE — 78815 PET IMAGE W/CT SKULL-THIGH: CPT

## 2020-01-01 PROCEDURE — 84439 ASSAY OF FREE THYROXINE: CPT | Performed by: INTERNAL MEDICINE

## 2020-01-01 PROCEDURE — 25010000002 FERRIC CARBOXYMALTOSE 750 MG/15ML SOLUTION 15 ML VIAL: Performed by: INTERNAL MEDICINE

## 2020-01-01 PROCEDURE — 85027 COMPLETE CBC AUTOMATED: CPT | Performed by: THORACIC SURGERY (CARDIOTHORACIC VASCULAR SURGERY)

## 2020-01-01 PROCEDURE — 25010000003 HEPARIN LOCK FLUSH PER 10 UNITS

## 2020-01-01 PROCEDURE — 36415 COLL VENOUS BLD VENIPUNCTURE: CPT | Performed by: INTERNAL MEDICINE

## 2020-01-01 PROCEDURE — 99245 OFF/OP CONSLTJ NEW/EST HI 55: CPT | Performed by: THORACIC SURGERY (CARDIOTHORACIC VASCULAR SURGERY)

## 2020-01-01 PROCEDURE — 84466 ASSAY OF TRANSFERRIN: CPT | Performed by: INTERNAL MEDICINE

## 2020-01-01 PROCEDURE — 96360 HYDRATION IV INFUSION INIT: CPT

## 2020-01-01 PROCEDURE — 96367 TX/PROPH/DG ADDL SEQ IV INF: CPT

## 2020-01-01 PROCEDURE — 77001 FLUOROGUIDE FOR VEIN DEVICE: CPT

## 2020-01-01 PROCEDURE — 0 IOPAMIDOL PER 1 ML: Performed by: INTERNAL MEDICINE

## 2020-01-01 PROCEDURE — 76000 FLUOROSCOPY <1 HR PHYS/QHP: CPT

## 2020-01-01 PROCEDURE — 25010000002 HEPARIN (PORCINE) PER 1000 UNITS: Performed by: THORACIC SURGERY (CARDIOTHORACIC VASCULAR SURGERY)

## 2020-01-01 PROCEDURE — 99205 OFFICE O/P NEW HI 60 MIN: CPT | Performed by: INTERNAL MEDICINE

## 2020-01-01 PROCEDURE — 93010 ELECTROCARDIOGRAM REPORT: CPT | Performed by: INTERNAL MEDICINE

## 2020-01-01 PROCEDURE — 96376 TX/PRO/DX INJ SAME DRUG ADON: CPT

## 2020-01-01 PROCEDURE — 81003 URINALYSIS AUTO W/O SCOPE: CPT | Performed by: INTERNAL MEDICINE

## 2020-01-01 PROCEDURE — 25010000002 GADOTERIDOL PER 1 ML: Performed by: INTERNAL MEDICINE

## 2020-01-01 PROCEDURE — 93005 ELECTROCARDIOGRAM TRACING: CPT | Performed by: ANESTHESIOLOGY

## 2020-01-01 PROCEDURE — 99024 POSTOP FOLLOW-UP VISIT: CPT | Performed by: THORACIC SURGERY (CARDIOTHORACIC VASCULAR SURGERY)

## 2020-01-01 PROCEDURE — 25010000002 RAMUCIRUMAB 500 MG/50ML SOLUTION 50 ML VIAL: Performed by: INTERNAL MEDICINE

## 2020-01-01 PROCEDURE — 82962 GLUCOSE BLOOD TEST: CPT

## 2020-01-01 PROCEDURE — A9579 GAD-BASE MR CONTRAST NOS,1ML: HCPCS | Performed by: INTERNAL MEDICINE

## 2020-01-01 PROCEDURE — 77263 THER RADIOLOGY TX PLNG CPLX: CPT | Performed by: RADIOLOGY

## 2020-01-01 PROCEDURE — 77001 FLUOROGUIDE FOR VEIN DEVICE: CPT | Performed by: THORACIC SURGERY (CARDIOTHORACIC VASCULAR SURGERY)

## 2020-01-01 PROCEDURE — 96365 THER/PROPH/DIAG IV INF INIT: CPT

## 2020-01-01 PROCEDURE — 77280 THER RAD SIMULAJ FIELD SMPL: CPT | Performed by: RADIOLOGY

## 2020-01-01 PROCEDURE — 99214 OFFICE O/P EST MOD 30 MIN: CPT | Performed by: NURSE PRACTITIONER

## 2020-01-01 PROCEDURE — U0004 COV-19 TEST NON-CDC HGH THRU: HCPCS

## 2020-01-01 PROCEDURE — 25010000002: Performed by: INTERNAL MEDICINE

## 2020-01-01 PROCEDURE — 80053 COMPREHEN METABOLIC PANEL: CPT

## 2020-01-01 PROCEDURE — 74177 CT ABD & PELVIS W/CONTRAST: CPT

## 2020-01-01 PROCEDURE — A9552 F18 FDG: HCPCS | Performed by: INTERNAL MEDICINE

## 2020-01-01 PROCEDURE — 74183 MRI ABD W/O CNTR FLWD CNTR: CPT

## 2020-01-01 PROCEDURE — C1788 PORT, INDWELLING, IMP: HCPCS | Performed by: THORACIC SURGERY (CARDIOTHORACIC VASCULAR SURGERY)

## 2020-01-01 PROCEDURE — 80048 BASIC METABOLIC PNL TOTAL CA: CPT | Performed by: THORACIC SURGERY (CARDIOTHORACIC VASCULAR SURGERY)

## 2020-01-01 PROCEDURE — 82728 ASSAY OF FERRITIN: CPT | Performed by: INTERNAL MEDICINE

## 2020-01-01 PROCEDURE — 83540 ASSAY OF IRON: CPT | Performed by: INTERNAL MEDICINE

## 2020-01-01 PROCEDURE — G0463 HOSPITAL OUTPT CLINIC VISIT: HCPCS | Performed by: RADIOLOGY

## 2020-01-01 PROCEDURE — 25010000002 MIDAZOLAM PER 1 MG: Performed by: ANESTHESIOLOGY

## 2020-01-01 PROCEDURE — 71260 CT THORAX DX C+: CPT

## 2020-01-01 PROCEDURE — 99214 OFFICE O/P EST MOD 30 MIN: CPT | Performed by: RADIOLOGY

## 2020-01-01 PROCEDURE — 99215 OFFICE O/P EST HI 40 MIN: CPT | Performed by: NURSE PRACTITIONER

## 2020-01-01 PROCEDURE — 84443 ASSAY THYROID STIM HORMONE: CPT | Performed by: INTERNAL MEDICINE

## 2020-01-01 DEVICE — POWERPORT M.R.I. IMPLANTABLE PORT WITH ATTACHABLE 8F CHRONOFLEX OPEN-ENDED SINGLE-LUMEN VENOUS CATHETER INTERMEDIATE KIT  (WITH SUTURE PLUGS)
Type: IMPLANTABLE DEVICE | Site: SUBCLAVIAN | Status: FUNCTIONAL
Brand: POWERPORT M.R.I., CHRONOFLEX

## 2020-01-01 RX ORDER — SODIUM CHLORIDE 0.9 % (FLUSH) 0.9 %
10 SYRINGE (ML) INJECTION AS NEEDED
Status: DISCONTINUED | OUTPATIENT
Start: 2020-01-01 | End: 2020-01-01 | Stop reason: HOSPADM

## 2020-01-01 RX ORDER — DEXTROSE MONOHYDRATE 50 MG/ML
250 INJECTION, SOLUTION INTRAVENOUS ONCE
Status: CANCELLED | OUTPATIENT
Start: 2020-01-01

## 2020-01-01 RX ORDER — DOXYCYCLINE HYCLATE 50 MG/1
324 CAPSULE, GELATIN COATED ORAL 2 TIMES DAILY
Qty: 60 TABLET | Refills: 0 | Status: SHIPPED | OUTPATIENT
Start: 2020-01-01 | End: 2020-01-01

## 2020-01-01 RX ORDER — LISINOPRIL 10 MG/1
10 TABLET ORAL DAILY
COMMUNITY
End: 2020-01-01

## 2020-01-01 RX ORDER — ONDANSETRON 2 MG/ML
4 INJECTION INTRAMUSCULAR; INTRAVENOUS ONCE AS NEEDED
Status: DISCONTINUED | OUTPATIENT
Start: 2020-01-01 | End: 2020-01-01 | Stop reason: HOSPADM

## 2020-01-01 RX ORDER — HEPARIN SODIUM (PORCINE) LOCK FLUSH IV SOLN 100 UNIT/ML 100 UNIT/ML
500 SOLUTION INTRAVENOUS AS NEEDED
Status: CANCELLED | OUTPATIENT
Start: 2020-01-01

## 2020-01-01 RX ORDER — LEVOFLOXACIN 5 MG/ML
INJECTION, SOLUTION INTRAVENOUS
Status: DISCONTINUED
Start: 2020-01-01 | End: 2020-01-01 | Stop reason: WASHOUT

## 2020-01-01 RX ORDER — METOCLOPRAMIDE 5 MG/1
10 TABLET ORAL 2 TIMES DAILY
Qty: 60 TABLET | Refills: 3 | Status: SHIPPED | OUTPATIENT
Start: 2020-01-01

## 2020-01-01 RX ORDER — FAMOTIDINE 10 MG/ML
20 INJECTION, SOLUTION INTRAVENOUS AS NEEDED
Status: COMPLETED | OUTPATIENT
Start: 2020-01-01 | End: 2020-01-01

## 2020-01-01 RX ORDER — FAMOTIDINE 10 MG/ML
20 INJECTION, SOLUTION INTRAVENOUS AS NEEDED
Status: CANCELLED | OUTPATIENT
Start: 2020-01-01

## 2020-01-01 RX ORDER — SODIUM CHLORIDE 0.9 % (FLUSH) 0.9 %
20 SYRINGE (ML) INJECTION AS NEEDED
Status: CANCELLED | OUTPATIENT
Start: 2020-01-01

## 2020-01-01 RX ORDER — LIDOCAINE HYDROCHLORIDE 10 MG/ML
INJECTION, SOLUTION EPIDURAL; INFILTRATION; INTRACAUDAL; PERINEURAL AS NEEDED
Status: DISCONTINUED | OUTPATIENT
Start: 2020-01-01 | End: 2020-01-01 | Stop reason: SURG

## 2020-01-01 RX ORDER — SODIUM CHLORIDE 0.9 % (FLUSH) 0.9 %
10 SYRINGE (ML) INJECTION EVERY 12 HOURS SCHEDULED
Status: DISCONTINUED | OUTPATIENT
Start: 2020-01-01 | End: 2020-01-01 | Stop reason: HOSPADM

## 2020-01-01 RX ORDER — DIPHENHYDRAMINE HYDROCHLORIDE 50 MG/ML
50 INJECTION INTRAMUSCULAR; INTRAVENOUS AS NEEDED
Status: CANCELLED | OUTPATIENT
Start: 2020-01-01

## 2020-01-01 RX ORDER — FAMOTIDINE 10 MG/ML
20 INJECTION, SOLUTION INTRAVENOUS ONCE
Status: COMPLETED | OUTPATIENT
Start: 2020-01-01 | End: 2020-01-01

## 2020-01-01 RX ORDER — SODIUM CHLORIDE 0.9 % (FLUSH) 0.9 %
20 SYRINGE (ML) INJECTION AS NEEDED
Status: DISCONTINUED | OUTPATIENT
Start: 2020-01-01 | End: 2020-01-01 | Stop reason: HOSPADM

## 2020-01-01 RX ORDER — DEXTROSE MONOHYDRATE 50 MG/ML
250 INJECTION, SOLUTION INTRAVENOUS ONCE
Status: COMPLETED | OUTPATIENT
Start: 2020-01-01 | End: 2020-01-01

## 2020-01-01 RX ORDER — ACETAMINOPHEN 325 MG/1
650 TABLET ORAL ONCE
Status: COMPLETED | OUTPATIENT
Start: 2020-01-01 | End: 2020-01-01

## 2020-01-01 RX ORDER — HEPARIN SODIUM (PORCINE) LOCK FLUSH IV SOLN 100 UNIT/ML 100 UNIT/ML
500 SOLUTION INTRAVENOUS AS NEEDED
Status: DISCONTINUED | OUTPATIENT
Start: 2020-01-01 | End: 2020-01-01 | Stop reason: HOSPADM

## 2020-01-01 RX ORDER — SODIUM CHLORIDE 9 MG/ML
250 INJECTION, SOLUTION INTRAVENOUS ONCE
Status: COMPLETED | OUTPATIENT
Start: 2020-01-01 | End: 2020-01-01

## 2020-01-01 RX ORDER — PALONOSETRON 0.05 MG/ML
0.25 INJECTION, SOLUTION INTRAVENOUS ONCE
Status: COMPLETED | OUTPATIENT
Start: 2020-01-01 | End: 2020-01-01

## 2020-01-01 RX ORDER — SUCRALFATE ORAL 1 G/10ML
1 SUSPENSION ORAL
Qty: 1200 ML | Refills: 5 | Status: SHIPPED | OUTPATIENT
Start: 2020-01-01

## 2020-01-01 RX ORDER — SODIUM CHLORIDE 9 MG/ML
100 INJECTION, SOLUTION INTRAVENOUS CONTINUOUS
Status: CANCELLED | OUTPATIENT
Start: 2020-01-01

## 2020-01-01 RX ORDER — ONDANSETRON 2 MG/ML
INJECTION INTRAMUSCULAR; INTRAVENOUS AS NEEDED
Status: DISCONTINUED | OUTPATIENT
Start: 2020-01-01 | End: 2020-01-01 | Stop reason: SURG

## 2020-01-01 RX ORDER — HYDROCODONE BITARTRATE AND ACETAMINOPHEN 5; 325 MG/1; MG/1
1 TABLET ORAL EVERY 6 HOURS PRN
COMMUNITY
End: 2020-01-01

## 2020-01-01 RX ORDER — DEXAMETHASONE 4 MG/1
TABLET ORAL
Qty: 12 TABLET | Refills: 7 | Status: SHIPPED | OUTPATIENT
Start: 2020-01-01 | End: 2020-01-01 | Stop reason: SDUPTHER

## 2020-01-01 RX ORDER — CHLORHEXIDINE GLUCONATE 0.12 MG/ML
15 RINSE ORAL EVERY 12 HOURS SCHEDULED
Status: CANCELLED | OUTPATIENT
Start: 2020-01-01

## 2020-01-01 RX ORDER — DOXYCYCLINE HYCLATE 50 MG/1
CAPSULE, GELATIN COATED ORAL
Qty: 60 TABLET | Refills: 0 | Status: SHIPPED | OUTPATIENT
Start: 2020-01-01 | End: 2020-01-01

## 2020-01-01 RX ORDER — FAMOTIDINE 10 MG/ML
20 INJECTION, SOLUTION INTRAVENOUS ONCE
Status: CANCELLED | OUTPATIENT
Start: 2020-01-01

## 2020-01-01 RX ORDER — PALONOSETRON 0.05 MG/ML
0.25 INJECTION, SOLUTION INTRAVENOUS ONCE
Status: CANCELLED | OUTPATIENT
Start: 2020-01-01

## 2020-01-01 RX ORDER — FAMOTIDINE 20 MG/1
40 TABLET, FILM COATED ORAL 2 TIMES DAILY
COMMUNITY
End: 2021-01-01

## 2020-01-01 RX ORDER — POTASSIUM CHLORIDE 7.45 MG/ML
10 INJECTION INTRAVENOUS
Status: DISCONTINUED | OUTPATIENT
Start: 2020-01-01 | End: 2020-01-01 | Stop reason: HOSPADM

## 2020-01-01 RX ORDER — DEXAMETHASONE 4 MG/1
TABLET ORAL
Qty: 12 TABLET | Refills: 7 | Status: SHIPPED | OUTPATIENT
Start: 2020-01-01 | End: 2020-01-01

## 2020-01-01 RX ORDER — LIDOCAINE HYDROCHLORIDE AND EPINEPHRINE 10; 10 MG/ML; UG/ML
INJECTION, SOLUTION INFILTRATION; PERINEURAL AS NEEDED
Status: DISCONTINUED | OUTPATIENT
Start: 2020-01-01 | End: 2020-01-01 | Stop reason: HOSPADM

## 2020-01-01 RX ORDER — SODIUM CHLORIDE 9 MG/ML
250 INJECTION, SOLUTION INTRAVENOUS ONCE
Status: CANCELLED | OUTPATIENT
Start: 2020-01-01

## 2020-01-01 RX ORDER — OMEPRAZOLE 40 MG/1
40 CAPSULE, DELAYED RELEASE ORAL DAILY
COMMUNITY
Start: 2020-01-01 | End: 2020-01-01 | Stop reason: ALTCHOICE

## 2020-01-01 RX ORDER — RANITIDINE HCL 75 MG
75 TABLET ORAL 2 TIMES DAILY
COMMUNITY
End: 2020-01-01

## 2020-01-01 RX ORDER — SODIUM CHLORIDE 9 MG/ML
9 INJECTION, SOLUTION INTRAVENOUS CONTINUOUS PRN
Status: DISCONTINUED | OUTPATIENT
Start: 2020-01-01 | End: 2020-01-01 | Stop reason: HOSPADM

## 2020-01-01 RX ORDER — HYDROMORPHONE HCL 110MG/55ML
0.5 PATIENT CONTROLLED ANALGESIA SYRINGE INTRAVENOUS
Status: DISCONTINUED | OUTPATIENT
Start: 2020-01-01 | End: 2020-01-01 | Stop reason: HOSPADM

## 2020-01-01 RX ORDER — PROPOFOL 10 MG/ML
VIAL (ML) INTRAVENOUS AS NEEDED
Status: DISCONTINUED | OUTPATIENT
Start: 2020-01-01 | End: 2020-01-01 | Stop reason: SURG

## 2020-01-01 RX ORDER — HYDROCODONE BITARTRATE AND ACETAMINOPHEN 5; 325 MG/1; MG/1
1 TABLET ORAL EVERY 6 HOURS PRN
Qty: 90 TABLET | Refills: 0 | Status: SHIPPED | OUTPATIENT
Start: 2020-01-01 | End: 2021-01-01 | Stop reason: SDUPTHER

## 2020-01-01 RX ORDER — FENTANYL CITRATE 50 UG/ML
INJECTION, SOLUTION INTRAMUSCULAR; INTRAVENOUS AS NEEDED
Status: DISCONTINUED | OUTPATIENT
Start: 2020-01-01 | End: 2020-01-01 | Stop reason: SURG

## 2020-01-01 RX ORDER — ACETAMINOPHEN 325 MG/1
650 TABLET ORAL ONCE
Status: CANCELLED | OUTPATIENT
Start: 2020-01-01

## 2020-01-01 RX ORDER — ONDANSETRON HYDROCHLORIDE 8 MG/1
8 TABLET, FILM COATED ORAL 3 TIMES DAILY
Qty: 90 TABLET | Refills: 3 | Status: SHIPPED | OUTPATIENT
Start: 2020-01-01 | End: 2020-01-01 | Stop reason: SDUPTHER

## 2020-01-01 RX ORDER — DOXYCYCLINE HYCLATE 50 MG/1
1 CAPSULE, GELATIN COATED ORAL 2 TIMES DAILY
Qty: 60 TABLET | Refills: 11 | Status: SHIPPED | OUTPATIENT
Start: 2020-01-01 | End: 2021-01-01

## 2020-01-01 RX ORDER — DEXTROSE MONOHYDRATE 50 MG/ML
250 INJECTION, SOLUTION INTRAVENOUS ONCE
Status: DISCONTINUED | OUTPATIENT
Start: 2020-01-01 | End: 2020-01-01 | Stop reason: HOSPADM

## 2020-01-01 RX ORDER — SODIUM CHLORIDE 0.9 % (FLUSH) 0.9 %
3 SYRINGE (ML) INJECTION EVERY 12 HOURS SCHEDULED
Status: DISCONTINUED | OUTPATIENT
Start: 2020-01-01 | End: 2020-01-01 | Stop reason: HOSPADM

## 2020-01-01 RX ORDER — SODIUM CHLORIDE 9 MG/ML
1000 INJECTION, SOLUTION INTRAVENOUS CONTINUOUS
Status: CANCELLED | OUTPATIENT
Start: 2020-01-01

## 2020-01-01 RX ORDER — SODIUM CHLORIDE 9 MG/ML
250 INJECTION, SOLUTION INTRAVENOUS ONCE
Status: DISCONTINUED | OUTPATIENT
Start: 2020-01-01 | End: 2020-01-01 | Stop reason: HOSPADM

## 2020-01-01 RX ORDER — ACETAMINOPHEN 650 MG/1
650 SUPPOSITORY RECTAL ONCE AS NEEDED
Status: DISCONTINUED | OUTPATIENT
Start: 2020-01-01 | End: 2020-01-01 | Stop reason: HOSPADM

## 2020-01-01 RX ORDER — ONDANSETRON HYDROCHLORIDE 8 MG/1
8 TABLET, FILM COATED ORAL 3 TIMES DAILY PRN
Qty: 30 TABLET | Refills: 5 | Status: SHIPPED | OUTPATIENT
Start: 2020-01-01

## 2020-01-01 RX ORDER — LORAZEPAM 0.5 MG/1
0.5 TABLET ORAL EVERY 6 HOURS PRN
Qty: 20 TABLET | Refills: 0 | Status: SHIPPED | OUTPATIENT
Start: 2020-01-01 | End: 2021-01-01

## 2020-01-01 RX ORDER — SODIUM CHLORIDE 9 MG/ML
1000 INJECTION, SOLUTION INTRAVENOUS CONTINUOUS
Status: DISCONTINUED | OUTPATIENT
Start: 2020-01-01 | End: 2020-01-01 | Stop reason: HOSPADM

## 2020-01-01 RX ORDER — ACETAMINOPHEN 325 MG/1
650 TABLET ORAL ONCE AS NEEDED
Status: DISCONTINUED | OUTPATIENT
Start: 2020-01-01 | End: 2020-01-01 | Stop reason: HOSPADM

## 2020-01-01 RX ORDER — DIPHENHYDRAMINE HYDROCHLORIDE 50 MG/ML
50 INJECTION INTRAMUSCULAR; INTRAVENOUS AS NEEDED
Status: DISCONTINUED | OUTPATIENT
Start: 2020-01-01 | End: 2020-01-01 | Stop reason: HOSPADM

## 2020-01-01 RX ORDER — SODIUM CHLORIDE, SODIUM LACTATE, POTASSIUM CHLORIDE, CALCIUM CHLORIDE 600; 310; 30; 20 MG/100ML; MG/100ML; MG/100ML; MG/100ML
INJECTION, SOLUTION INTRAVENOUS CONTINUOUS PRN
Status: DISCONTINUED | OUTPATIENT
Start: 2020-01-01 | End: 2020-01-01 | Stop reason: SURG

## 2020-01-01 RX ORDER — HEPARIN SODIUM 10000 [USP'U]/ML
INJECTION, SOLUTION INTRAVENOUS; SUBCUTANEOUS AS NEEDED
Status: DISCONTINUED | OUTPATIENT
Start: 2020-01-01 | End: 2020-01-01 | Stop reason: HOSPADM

## 2020-01-01 RX ORDER — POTASSIUM CHLORIDE 20 MEQ/1
20 TABLET, EXTENDED RELEASE ORAL DAILY
Qty: 2 TABLET | Refills: 0 | Status: SHIPPED | OUTPATIENT
Start: 2020-01-01 | End: 2020-01-01

## 2020-01-01 RX ORDER — FENTANYL CITRATE 50 UG/ML
50 INJECTION, SOLUTION INTRAMUSCULAR; INTRAVENOUS
Status: DISCONTINUED | OUTPATIENT
Start: 2020-01-01 | End: 2020-01-01 | Stop reason: HOSPADM

## 2020-01-01 RX ORDER — POTASSIUM CHLORIDE 750 MG/1
30 TABLET, EXTENDED RELEASE ORAL DAILY
Qty: 21 TABLET | Refills: 0 | Status: SHIPPED | OUTPATIENT
Start: 2020-01-01 | End: 2020-01-01

## 2020-01-01 RX ORDER — ONDANSETRON HYDROCHLORIDE 8 MG/1
8 TABLET, FILM COATED ORAL 3 TIMES DAILY PRN
Qty: 30 TABLET | Refills: 5 | Status: SHIPPED | OUTPATIENT
Start: 2020-01-01 | End: 2020-01-01

## 2020-01-01 RX ORDER — GLYCOPYRROLATE 1 MG/5 ML
SYRINGE (ML) INTRAVENOUS AS NEEDED
Status: DISCONTINUED | OUTPATIENT
Start: 2020-01-01 | End: 2020-01-01 | Stop reason: SURG

## 2020-01-01 RX ORDER — MIDAZOLAM HYDROCHLORIDE 1 MG/ML
INJECTION INTRAMUSCULAR; INTRAVENOUS AS NEEDED
Status: DISCONTINUED | OUTPATIENT
Start: 2020-01-01 | End: 2020-01-01 | Stop reason: SURG

## 2020-01-01 RX ORDER — FERROUS SULFATE 325(65) MG
325 TABLET ORAL DAILY
COMMUNITY
End: 2021-01-01

## 2020-01-01 RX ADMIN — HEPARIN 500 UNITS: 100 SYRINGE at 13:33

## 2020-01-01 RX ADMIN — PACLITAXEL 140 MG: 6 INJECTION, SOLUTION INTRAVENOUS at 12:18

## 2020-01-01 RX ADMIN — Medication 20 ML: at 15:56

## 2020-01-01 RX ADMIN — DIPHENHYDRAMINE HYDROCHLORIDE 50 MG: 50 INJECTION, SOLUTION INTRAMUSCULAR; INTRAVENOUS at 12:37

## 2020-01-01 RX ADMIN — SODIUM CHLORIDE 9 ML/HR: 900 INJECTION, SOLUTION INTRAVENOUS at 06:29

## 2020-01-01 RX ADMIN — FLUOROURACIL 5200 MG: 50 INJECTION, SOLUTION INTRAVENOUS at 12:26

## 2020-01-01 RX ADMIN — HEPARIN 500 UNITS: 100 SYRINGE at 12:38

## 2020-01-01 RX ADMIN — PALONOSETRON 0.25 MG: 0.25 INJECTION, SOLUTION INTRAVENOUS at 08:50

## 2020-01-01 RX ADMIN — Medication 20 ML: at 13:33

## 2020-01-01 RX ADMIN — IOPAMIDOL 100 ML: 755 INJECTION, SOLUTION INTRAVENOUS at 09:15

## 2020-01-01 RX ADMIN — GADOTERIDOL 20 ML: 279.3 INJECTION, SOLUTION INTRAVENOUS at 11:30

## 2020-01-01 RX ADMIN — FENTANYL CITRATE 50 MCG: 50 INJECTION, SOLUTION INTRAMUSCULAR; INTRAVENOUS at 09:57

## 2020-01-01 RX ADMIN — SODIUM CHLORIDE 450 MG: 9 INJECTION, SOLUTION INTRAVENOUS at 10:55

## 2020-01-01 RX ADMIN — SODIUM CHLORIDE 750 MG: 900 INJECTION, SOLUTION INTRAVENOUS at 13:28

## 2020-01-01 RX ADMIN — LEUCOVORIN CALCIUM 400 MG: 200 INJECTION, POWDER, LYOPHILIZED, FOR SOLUTION INTRAMUSCULAR; INTRAVENOUS at 10:18

## 2020-01-01 RX ADMIN — DEXTROSE MONOHYDRATE 250 ML: 5 INJECTION, SOLUTION INTRAVENOUS at 09:16

## 2020-01-01 RX ADMIN — LEUCOVORIN CALCIUM 400 MG: 200 INJECTION, POWDER, LYOPHILIZED, FOR SOLUTION INTRAMUSCULAR; INTRAVENOUS at 10:08

## 2020-01-01 RX ADMIN — DOCETAXEL 100 MG: 20 INJECTION, SOLUTION, CONCENTRATE INTRAVENOUS at 09:09

## 2020-01-01 RX ADMIN — ACETAMINOPHEN 650 MG: 325 TABLET ORAL at 15:50

## 2020-01-01 RX ADMIN — DEXAMETHASONE SODIUM PHOSPHATE 12 MG: 4 INJECTION, SOLUTION INTRA-ARTICULAR; INTRALESIONAL; INTRAMUSCULAR; INTRAVENOUS; SOFT TISSUE at 09:15

## 2020-01-01 RX ADMIN — FLUOROURACIL 5070 MG: 50 INJECTION, SOLUTION INTRAVENOUS at 12:31

## 2020-01-01 RX ADMIN — DEXAMETHASONE SODIUM PHOSPHATE 12 MG: 4 INJECTION, SOLUTION INTRA-ARTICULAR; INTRALESIONAL; INTRAMUSCULAR; INTRAVENOUS; SOFT TISSUE at 09:10

## 2020-01-01 RX ADMIN — SODIUM CHLORIDE 1000 ML: 9 INJECTION, SOLUTION INTRAVENOUS at 13:14

## 2020-01-01 RX ADMIN — DOCETAXEL 100 MG: 20 INJECTION, SOLUTION, CONCENTRATE INTRAVENOUS at 09:12

## 2020-01-01 RX ADMIN — Medication 10 ML: at 12:38

## 2020-01-01 RX ADMIN — Medication 20 ML: at 10:59

## 2020-01-01 RX ADMIN — CEFAZOLIN SODIUM 2 G: 1 INJECTION, POWDER, FOR SOLUTION INTRAMUSCULAR; INTRAVENOUS at 09:48

## 2020-01-01 RX ADMIN — MIDAZOLAM 1 MG: 1 INJECTION INTRAMUSCULAR; INTRAVENOUS at 09:55

## 2020-01-01 RX ADMIN — OXALIPLATIN 170 MG: 5 INJECTION, SOLUTION INTRAVENOUS at 10:25

## 2020-01-01 RX ADMIN — HEPARIN 500 UNITS: 100 SYRINGE at 10:35

## 2020-01-01 RX ADMIN — LIDOCAINE HYDROCHLORIDE 50 MG: 10 INJECTION, SOLUTION EPIDURAL; INFILTRATION; INTRACAUDAL; PERINEURAL at 07:23

## 2020-01-01 RX ADMIN — FLUOROURACIL 5070 MG: 50 INJECTION, SOLUTION INTRAVENOUS at 12:52

## 2020-01-01 RX ADMIN — PACLITAXEL 140 MG: 6 INJECTION, SOLUTION INTRAVENOUS at 10:09

## 2020-01-01 RX ADMIN — HEPARIN 500 UNITS: 100 SYRINGE at 15:57

## 2020-01-01 RX ADMIN — DOCETAXEL 100 MG: 20 INJECTION, SOLUTION, CONCENTRATE INTRAVENOUS at 08:55

## 2020-01-01 RX ADMIN — SODIUM CHLORIDE 250 ML: 9 INJECTION, SOLUTION INTRAVENOUS at 10:54

## 2020-01-01 RX ADMIN — LEUCOVORIN CALCIUM 400 MG: 200 INJECTION, POWDER, LYOPHILIZED, FOR SOLUTION INTRAMUSCULAR; INTRAVENOUS at 10:58

## 2020-01-01 RX ADMIN — SODIUM CHLORIDE 250 ML: 9 INJECTION, SOLUTION INTRAVENOUS at 08:45

## 2020-01-01 RX ADMIN — OXALIPLATIN 170 MG: 5 INJECTION, SOLUTION INTRAVENOUS at 10:21

## 2020-01-01 RX ADMIN — HEPARIN 500 UNITS: 100 SYRINGE at 11:00

## 2020-01-01 RX ADMIN — OXALIPLATIN 135 MG: 5 INJECTION, SOLUTION, CONCENTRATE INTRAVENOUS at 10:25

## 2020-01-01 RX ADMIN — Medication 20 ML: at 11:20

## 2020-01-01 RX ADMIN — SODIUM CHLORIDE 100 MG: 9 INJECTION, SOLUTION INTRAVENOUS at 09:37

## 2020-01-01 RX ADMIN — PALONOSETRON 0.25 MG: 0.25 INJECTION, SOLUTION INTRAVENOUS at 09:17

## 2020-01-01 RX ADMIN — Medication 20 ML: at 14:20

## 2020-01-01 RX ADMIN — SODIUM CHLORIDE, SODIUM LACTATE, POTASSIUM CHLORIDE, AND CALCIUM CHLORIDE: .6; .31; .03; .02 INJECTION, SOLUTION INTRAVENOUS at 09:48

## 2020-01-01 RX ADMIN — HEPARIN 500 UNITS: 100 SYRINGE at 16:45

## 2020-01-01 RX ADMIN — HEPARIN 500 UNITS: 100 SYRINGE at 14:21

## 2020-01-01 RX ADMIN — HEPARIN 500 UNITS: 100 SYRINGE at 15:15

## 2020-01-01 RX ADMIN — Medication 10 ML: at 16:45

## 2020-01-01 RX ADMIN — OXALIPLATIN 170 MG: 5 INJECTION, SOLUTION INTRAVENOUS at 10:10

## 2020-01-01 RX ADMIN — SODIUM CHLORIDE 500 MG: 9 INJECTION, SOLUTION INTRAVENOUS at 13:14

## 2020-01-01 RX ADMIN — FLUOROURACIL 5200 MG: 50 INJECTION, SOLUTION INTRAVENOUS at 13:10

## 2020-01-01 RX ADMIN — SODIUM CHLORIDE 250 ML: 9 INJECTION, SOLUTION INTRAVENOUS at 08:37

## 2020-01-01 RX ADMIN — HEPARIN 500 UNITS: 100 SYRINGE at 11:21

## 2020-01-01 RX ADMIN — LEUCOVORIN CALCIUM 400 MG: 200 INJECTION, POWDER, LYOPHILIZED, FOR SOLUTION INTRAMUSCULAR; INTRAVENOUS at 10:20

## 2020-01-01 RX ADMIN — DIPHENHYDRAMINE HYDROCHLORIDE 50 MG: 50 INJECTION, SOLUTION INTRAMUSCULAR; INTRAVENOUS at 08:54

## 2020-01-01 RX ADMIN — PALONOSETRON 0.25 MG: 0.25 INJECTION, SOLUTION INTRAVENOUS at 08:18

## 2020-01-01 RX ADMIN — PROPOFOL 150 MG: 10 INJECTION, EMULSION INTRAVENOUS at 09:45

## 2020-01-01 RX ADMIN — PROPOFOL 150 MG: 10 INJECTION, EMULSION INTRAVENOUS at 07:23

## 2020-01-01 RX ADMIN — FENTANYL CITRATE 50 MCG: 50 INJECTION, SOLUTION INTRAMUSCULAR; INTRAVENOUS at 09:55

## 2020-01-01 RX ADMIN — Medication 20 ML: at 15:14

## 2020-01-01 RX ADMIN — OXALIPLATIN 165 MG: 5 INJECTION, SOLUTION INTRAVENOUS at 10:49

## 2020-01-01 RX ADMIN — HEPARIN 500 UNITS: 100 SYRINGE at 15:14

## 2020-01-01 RX ADMIN — DIPHENHYDRAMINE HYDROCHLORIDE 25 MG: 50 INJECTION, SOLUTION INTRAMUSCULAR; INTRAVENOUS at 08:37

## 2020-01-01 RX ADMIN — FAMOTIDINE 20 MG: 10 INJECTION INTRAVENOUS at 14:14

## 2020-01-01 RX ADMIN — FLUOROURACIL 5200 MG: 50 INJECTION, SOLUTION INTRAVENOUS at 12:33

## 2020-01-01 RX ADMIN — OXALIPLATIN 170 MG: 5 INJECTION, SOLUTION INTRAVENOUS at 11:01

## 2020-01-01 RX ADMIN — DEXAMETHASONE SODIUM PHOSPHATE 12 MG: 4 INJECTION, SOLUTION INTRA-ARTICULAR; INTRALESIONAL; INTRAMUSCULAR; INTRAVENOUS; SOFT TISSUE at 12:54

## 2020-01-01 RX ADMIN — LIDOCAINE HYDROCHLORIDE 50 MG: 10 INJECTION, SOLUTION EPIDURAL; INFILTRATION; INTRACAUDAL; PERINEURAL at 09:57

## 2020-01-01 RX ADMIN — FAMOTIDINE 20 MG: 10 INJECTION INTRAVENOUS at 12:35

## 2020-01-01 RX ADMIN — DOCETAXEL 80 MG: 20 INJECTION, SOLUTION, CONCENTRATE INTRAVENOUS at 09:14

## 2020-01-01 RX ADMIN — DIPHENHYDRAMINE HYDROCHLORIDE 25 MG: 50 INJECTION, SOLUTION INTRAMUSCULAR; INTRAVENOUS at 08:53

## 2020-01-01 RX ADMIN — DOCETAXEL 100 MG: 20 INJECTION, SOLUTION, CONCENTRATE INTRAVENOUS at 09:49

## 2020-01-01 RX ADMIN — MIDAZOLAM 1 MG: 1 INJECTION INTRAMUSCULAR; INTRAVENOUS at 09:58

## 2020-01-01 RX ADMIN — Medication 0.1 MG: at 07:03

## 2020-01-01 RX ADMIN — FAMOTIDINE 20 MG: 10 INJECTION INTRAVENOUS at 08:37

## 2020-01-01 RX ADMIN — SODIUM CHLORIDE 1000 ML/HR: 9 INJECTION, SOLUTION INTRAVENOUS at 08:50

## 2020-01-01 RX ADMIN — ONDANSETRON 4 MG: 2 INJECTION INTRAMUSCULAR; INTRAVENOUS at 10:02

## 2020-01-01 RX ADMIN — ONDANSETRON 4 MG: 2 INJECTION INTRAMUSCULAR; INTRAVENOUS at 07:23

## 2020-01-01 RX ADMIN — Medication 20 ML: at 10:35

## 2020-01-01 RX ADMIN — LEUCOVORIN CALCIUM 390 MG: 200 INJECTION, POWDER, LYOPHILIZED, FOR SOLUTION INTRAMUSCULAR; INTRAVENOUS at 10:22

## 2020-01-01 RX ADMIN — FLUDEOXYGLUCOSE F18 1 DOSE: 300 INJECTION INTRAVENOUS at 07:32

## 2020-01-01 RX ADMIN — SODIUM CHLORIDE: 900 INJECTION, SOLUTION INTRAVENOUS at 07:13

## 2020-01-01 RX ADMIN — SODIUM CHLORIDE 250 ML: 9 INJECTION, SOLUTION INTRAVENOUS at 12:33

## 2020-01-01 RX ADMIN — PALONOSETRON 0.25 MG: 0.25 INJECTION, SOLUTION INTRAVENOUS at 08:56

## 2020-01-01 RX ADMIN — FAMOTIDINE 20 MG: 10 INJECTION INTRAVENOUS at 08:48

## 2020-01-01 RX ADMIN — DEXTROSE MONOHYDRATE 250 ML: 5 INJECTION, SOLUTION INTRAVENOUS at 08:49

## 2020-01-01 RX ADMIN — HYDROCORTISONE SODIUM SUCCINATE 50 MG: 100 INJECTION, POWDER, FOR SOLUTION INTRAMUSCULAR; INTRAVENOUS at 14:16

## 2020-01-01 RX ADMIN — LEUCOVORIN CALCIUM 390 MG: 200 INJECTION, POWDER, LYOPHILIZED, FOR SOLUTION INTRAMUSCULAR; INTRAVENOUS at 10:49

## 2020-01-01 RX ADMIN — DEXTROSE MONOHYDRATE 250 ML: 5 INJECTION, SOLUTION INTRAVENOUS at 08:50

## 2020-01-01 RX ADMIN — PACLITAXEL 140 MG: 6 INJECTION, SOLUTION INTRAVENOUS at 09:25

## 2020-01-01 RX ADMIN — DEXTROSE MONOHYDRATE 250 ML: 5 INJECTION, SOLUTION INTRAVENOUS at 08:38

## 2020-01-01 RX ADMIN — PROPOFOL 150 MG: 10 INJECTION, EMULSION INTRAVENOUS at 09:50

## 2020-01-01 RX ADMIN — PALONOSETRON 0.25 MG: 0.25 INJECTION, SOLUTION INTRAVENOUS at 08:37

## 2020-01-01 RX ADMIN — FAMOTIDINE 20 MG: 10 INJECTION INTRAVENOUS at 08:50

## 2020-01-01 RX ADMIN — SODIUM CHLORIDE 500 ML/HR: 9 INJECTION, SOLUTION INTRAVENOUS at 08:44

## 2020-01-01 RX ADMIN — DEXAMETHASONE SODIUM PHOSPHATE 12 MG: 4 INJECTION, SOLUTION INTRA-ARTICULAR; INTRALESIONAL; INTRAMUSCULAR; INTRAVENOUS; SOFT TISSUE at 09:00

## 2020-01-01 RX ADMIN — DOCETAXEL 100 MG: 20 INJECTION, SOLUTION, CONCENTRATE INTRAVENOUS at 09:40

## 2020-01-01 RX ADMIN — PROPOFOL 100 MG: 10 INJECTION, EMULSION INTRAVENOUS at 07:28

## 2020-05-15 NOTE — TELEPHONE ENCOUNTER
Patient has a new patient appt on Monday 05/18/20. Patients wife wants to know if she can go in the room with patient.       Call dany (wife) back at [phone]

## 2020-05-18 NOTE — PROGRESS NOTES
Radiation Oncology Consult Note    Name: Tyree Orourke  YOB: 1960  MRN #: [id number]  Date of Service: 5/18/2020  Referring Provider: Glenys Brewster MD  [address] [place] [address], [place] [address]  Primary Care Provider: Glenys Brewster MD      DIAGNOSIS: Staging work-up pending but outside hospital reports with concern for potential metastatic spread.  GEJ tumor with signet ring features,poorly differentiated.    REASON FOR CONSULTATION/CHIEF COMPLAINT:  I was asked to see the patient at the request of the referring provider and Dr. Jackson Peters for advice and recommendations regarding this diagnosis and the role of radiation therapy.                              REQUESTING PHYSICIAN:  Glenys Brewster Md  [address] [place] [address], [place] [address]    RECORDS OBTAINED:  Records of the patients history including those obtained from the referring provider were reviewed and summarized in detail.    HISTORY OF PRESENT ILLNESS:  Tyree Orourke is a 60 y.o. 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  He denies many years of GERD symptoms but reports a long time ago, took Tums regularly then went away; about 15 years remotely.  He unfortunately developed GI symptoms about 2 months ago.  His   Weight down 20 lbs in 2 months. He denies any pain or heartburn.  Over the last two months, he does report SOB easily with exertion.  He has had the following imaging and work-up/biospy:    · 5/13/2020: Creatinine 0.85, ALT 15, AST 21 alk phos 100, bilirubin 0.6, albumin 3.7, calcium 9.0,  · 5/13/2020: Patient underwent upper GI endoscopy: Surgical pathology of gastric fundus biopsy shows poorly differentiated carcinoma with signet ring cell features involving cardio oxyntic mucosa.  Gastric antrum biopsy shows chronic gastritis.  Negative for H. Pylori.  · 5/13/2020: CT chest and abdomen with contrast: There is ill-defined low-density lesion  posteriorly in the right lobe of the liver which could be a metastatic lesion, measuring 2 cm,.  There is a significant thickening throughout the GE junction region extending to the level of the proximal stomach, suspicious for tumor.  Soft tissue planes are ill-defined at the inferior aspect of this area due to tumor infiltration.  There is moderate adenopathy in the retroperitoneum and periceliac region.  Evidence of PE.     · 5/14/2020: CT angiogram of the chest with contrast: Bilateral segmental pulmonary emboli without definitive evidence of right heart strain.  Small nodule along the left major fissure consistent with a intrapulmonary lymph node.  Redemonstration of lower esophageal/gastric mass with surrounding lymphadenopathy and a hypoattenuating lesion in the right hepatic lobe.    No smoking, quit 20 years  Works as Supervisor at [employer]  Social etoh, never heavy.      Dr. Peters has started him on Eliquis and PET/CT has been ordered to complete his staging work-up. He denies symptoms except as noted above.  He is going on a 4 day golfing trip and will return for PET/CT and evaluation by Dr. Adkins at that time.      From a local standpoint in the GEJ, he does not have a lot of pain and denies bleeding, no hemoptysis, hematemesis, melena or hematochezia.  He denies pain in epigastric, RUQ or LUQ.    Again his liver functions are normal but CT is concerning for 2 cm questionable lesion in the liver. The reports of the above imaging are not available for my review at time of consultation.    The following portions of the patient's history were reviewed and updated as appropriate: allergies, current medications, past family history, past medical history, past social history, past surgical history and problem list. Reviewed with the patient and remain unchanged.    PAST MEDICAL HISTORY:  he  has a past medical history of Esophageal cancer (CMS/HCC), GERD (gastroesophageal reflux disease),  "Hypertension, and Lupus (CMS/HCC).  MEDICATIONS:   Current Outpatient Medications:   •  lisinopril (PRINIVIL,ZESTRIL) 10 MG tablet, lisinopril 10 mg tablet  TAKE 1 TABLET BY MOUTH ONCE DAILY, Disp: , Rfl:   •  omeprazole (priLOSEC) 40 MG capsule, Take 40 mg by mouth Daily., Disp: , Rfl:   •  apixaban (ELIQUIS) 2.5 MG tablet tablet, Take 2 tablets by mouth Every 12 (Twelve) Hours., Disp: 120 tablet, Rfl: 3  ALLERGIES: No Known Allergies  PAST SURGICAL HISTORY: he has a past surgical history that includes Replacement total knee (Left, 04/2019).  PREVIOUS RADIOTHERAPY OR CHEMOTHERAPY: No  FAMILY HISTORY: his family history includes Leukemia in his mother.  SOCIAL HISTORY: he  reports that he quit smoking about 20 years ago. His smoking use included cigarettes. He has never used smokeless tobacco. He reports that he drinks alcohol. He reports that he does not use drugs.  PAIN AND PAIN MANAGEMENT: Discussed needs with Dr. Peters earlier today who will manage his pain meds.  He was started on pepcid but again denies GERD symptoms.  Vitals:    05/18/20 1347   BP: 165/90   Pulse: 91   Resp: 19   Temp: 97.1 °F (36.2 °C)   TempSrc: Oral   SpO2: 98%   Weight: 89.4 kg (197 lb)   Height: 177.8 cm (70\")   PainSc: 0-No pain     NUTRITIONAL STATUS:  Otherwise no issues  KPS: 70        Review of Systems:   Review of Systems   Constitutional: Positive for appetite change and unexpected weight change.   Respiratory: Positive for shortness of breath.    Gastrointestinal: Positive for vomiting.        Otherwise negative as below.     General: No fevers, chills, weight change, or drenching night sweats. Skin: No rashes or jaundice.  HEENT: No change in vision or hearing, no headaches.  Neck: No dysphagia or masses.  Heme/Lymph: No easy bruising or bleeding.  Respiratory System: No shortness of breath or cough.  Cardiovascular: No chest pain, palpitations, or dyspnea on exertion.  - Pacemaker. GI: No nausea, vomiting, diarrhea, melena, or " "hematochezia.  : No dysuria or hematuria.  Endocrine: No heat or cold intolerance. Musculoskeletal: No myalgias or arthralgias.  Neuro: No weakness, numbness, syncope, or seizures. Psych: No mood changes or depression. Ext: Denies swelling.        Objective     Vitals:  Vitals:    05/18/20 1347   BP: 165/90   Pulse: 91   Resp: 19   Temp: 97.1 °F (36.2 °C)   TempSrc: Oral   SpO2: 98%   Weight: 89.4 kg (197 lb)   Height: 177.8 cm (70\")   PainSc: 0-No pain         PHYSICAL EXAM:  GENERAL: in no apparent distress, sitting comfortably in room.    HEENT: normocephalic, atraumatic. Pupils are equal, round, reactive to light. Sclera anicteric. Conjunctiva not injected. Oropharynx without erythema, ulcerations or thrush.   NECK: Supple with no masses.  LYMPHATIC: no cervical, supraclavicular or axillary adenopathy appreciated bilaterally.   CARDIOVASCULAR: S1 & S2 detected; no murmurs, rubs or gallops.  CHEST: clear to auscultation bilaterally; no wheezes, crackles or rubs. Work of breathing normal.  ABDOMEN: bowel sounds present. Abdomen is soft, nontender, nondistended.   MUSCULOSKELETAL: no tenderness to palpation along the spine or scapulae. Normal range of motion.  EXTREMITIES: no clubbing, cyanosis, edema.  SKIN: no erythema, rashes, ulcerations noted.   NEUROLOGIC: cranial nerves II-XII grossly intact bilaterally. No focal neurologic deficits.  PSYCHIATRIC:  alert, aware, and appropriate.      PERTINENT IMAGING/PATHOLOGY/LABS (Medical Decision Making):     COORDINATION OF CARE: A copy of this note is sent to the referring provider.    PATHOLOGY (Reviewed): [facility] pathology to be reviewed, discussed with Dr. Peters.    IMAGING (Reviewed): PET/CT ordered for 05/26/2020    LABS (Reviewed):LFTs look ok. CT does show possible 2 cm met.  Assessment/Plan     ASSESSMENT AND PLAN:  GEJ malignancy, poorly differentiated signet ring carcinoma.  To see Dr. Adkins next week after he gets his PET/CT for staging.  To determine if " GEJ vs. Gastric primary. Case will be placed on tumor board. Concern for potential metastasis but no imaging for my independent review today.    If stage IV disease, he is largely asymptomatic and plan may be feeding tube and starting systemic therapy.  If tri-modality therapy an option we will know more after getting the outside discs from the CTs and the final results of the PET/CT.    Patient voiced his understanding.  He is now on Eliquis for his bilateral PE.    I have spent 45 minutes in the room with patient and his wife (wife on phone).  Discussed definitive and palliative options with XRT and potential where XRT therapy may not be required.  He is going to monitor his symptoms closely.         This assessment comes from my review of the imaging, pathology, physician notes and other pertinent information as mentioned.    DISPOSITION: PET/CT next step and eval with Dr. Adkins.  Discussed case in detail with Dr. Peters today.        TIME SPENT WITH PATIENT:   I spent greater than 45 minutes in face-to-face time with the patient and greater than 50% of those minutes were spent in counseling and coordination of care, including review of imaging and pathology; indications, goals, logistics, alternatives and risks - both common and rare - for my recommendations as well as surveillance and potential outcomes.         CC: Glenys Brewster MD Ingram, Kristen, MD Ajay Kandra, MD Michael Bousamra, MD John A Cox, MD  5/19/2020  1:49 PM

## 2020-05-19 PROBLEM — C15.9 MALIGNANT NEOPLASM OF ESOPHAGUS (HCC): Status: ACTIVE | Noted: 2020-01-01

## 2020-05-19 NOTE — TELEPHONE ENCOUNTER
S/W PT'S WIFE WHO REQUESTED FUP ON 5/28/20 AND CONFIRMED TIME AS WELL AS THE NUMBER TO CALL WHEN THEY ARRIVE AND MASKING POLICY.

## 2020-05-28 PROBLEM — E78.1 HYPERTRIGLYCERIDEMIA: Status: ACTIVE | Noted: 2020-01-01

## 2020-05-28 PROBLEM — R10.13 EPIGASTRIC PAIN: Status: RESOLVED | Noted: 2020-01-01 | Resolved: 2020-01-01

## 2020-05-28 PROBLEM — R10.13 EPIGASTRIC PAIN: Status: ACTIVE | Noted: 2020-01-01

## 2020-05-28 PROBLEM — R59.9 ADENOPATHY: Status: ACTIVE | Noted: 2020-01-01

## 2020-05-28 PROBLEM — I10 ESSENTIAL HYPERTENSION: Status: ACTIVE | Noted: 2020-01-01

## 2020-05-28 PROBLEM — R63.4 WEIGHT LOSS: Status: ACTIVE | Noted: 2020-01-01

## 2020-05-28 NOTE — PROGRESS NOTES
Thoracic surgery consult  Reason for consult: Esophageal cancer  Requesting physician: Dr. Peters     Chief complaint dysphagia    Subjective   I have reviewed the hospital record and reviewed the accompanying physician notes.  I have discussed the case with the following physicians: Case discussed with Dr. Reno and with Dr. Cee of radiation oncology.  Concern is raised for stage IV disease.  Case will also be discussed at tumor board.    History of Present Illness  This is a 60-year-old man who presents with 2-1/2 months of dysphagia and a 30 pound weight loss.  Dysphagia was primarily to solid foods.  He had mild epigastric discomfort but no pain.  He denies any headaches long bone pain spine pain rib pain or pelvic pain.  He occasionally had regurgitation.  There is no hematemesis or melena oral or hematochezia.  He noted dyspnea on exertion more recently.  He is not dyspneic at rest.  A PE protocol CT scan showed multiple pulmonary emboli and he has begun on Eliquis.  The patient underwent both a contrast-enhanced CT scan and a PET scan.  The contrast-enhanced CT scan shows retroperitoneal adenopathy the esophageal mass.  The PET scan lights up the esophageal mass but does not light up any of the retroperitoneal adenopathy.  There was also report of a 2 cm liver lesion which does not light up on the PET scan.  I do not have the CT scans for review that identifies the liver lesion.  Overall the patient is feeling relatively well.  His energy level is good.    Review of Systems  All systems reviewed.  Positives include loss of weight gas indigestion.  All other systems reviewed and are negative.    Past Medical History:   Diagnosis Date   • Esophageal cancer (CMS/HCC)    • GERD (gastroesophageal reflux disease)    • Hypertension    • Lupus (CMS/HCC)       Social History     Socioeconomic History   • Marital status:      Spouse name: Not on file   • Number of children: Not on file   • Years of education:  Not on file   • Highest education level: Not on file   Social Needs   • Financial resource strain: Not on file   • Food insecurity:     Worry: Patient refused     Inability: Patient refused   • Transportation needs:     Medical: Patient refused     Non-medical: Patient refused   Tobacco Use   • Smoking status: Former Smoker     Types: Cigarettes     Last attempt to quit: 2000     Years since quittin.0   • Smokeless tobacco: Never Used   Substance and Sexual Activity   • Alcohol use: Yes     Frequency: 2-4 times a month     Comment: Socially   • Drug use: Never   • Sexual activity: Defer     Partners: Female   Lifestyle   • Physical activity:     Days per week: Patient refused     Minutes per session: Patient refused   • Stress: Patient refused   Relationships   • Social connections:     Talks on phone: Patient refused     Gets together: Patient refused     Attends [employer] service: Patient refused     Active member of club or organization: Patient refused     Attends meetings of clubs or organizations: Patient refused     Relationship status: Patient refused   Social History Narrative    Patient lives in [place], [place], with his wife. He has one child. He does not smoke. He works at [employer].    family history includes Leukemia in his mother.   Past Surgical History:   Procedure Laterality Date   • REPLACEMENT TOTAL KNEE Left 2019       Objective      Vital Signs  Heart Rate:  [103] 103  BP: (131)/(81) 131/81    [unfilled]    Physical Exam   Constitutional: He is oriented to person, place, and time. He appears well-developed and well-nourished. No distress.   HENT:   Head: Normocephalic and atraumatic.   Right Ear: External ear normal.   Left Ear: External ear normal.   Mouth/Throat: Oropharynx is clear and moist. No oropharyngeal exudate.   Eyes: Pupils are equal, round, and reactive to light. Conjunctivae and EOM are normal. Right eye exhibits no discharge. Left eye exhibits no discharge.   Neck: Normal  range of motion. No JVD present. No tracheal deviation present. No thyromegaly present.   Cardiovascular: Normal rate, regular rhythm and normal heart sounds.   Pulmonary/Chest: Effort normal and breath sounds normal. No stridor. No respiratory distress. He has no wheezes.   Abdominal: Soft. Bowel sounds are normal. He exhibits no distension and no mass. There is no tenderness. There is no guarding.   Musculoskeletal: Normal range of motion. He exhibits no edema, tenderness or deformity.   Lymphadenopathy:     He has no cervical adenopathy.   Neurological: He is alert and oriented to person, place, and time. No cranial nerve deficit or sensory deficit. He exhibits normal muscle tone. Coordination normal.   Skin: Skin is warm and dry. Capillary refill takes less than 2 seconds. No rash noted. He is not diaphoretic. No erythema. No pallor.   Psychiatric: He has a normal mood and affect. His behavior is normal. Judgment and thought content normal.        Results Review:  I have personally reviewed the following radiographs and reviewed the radiology reports.  My independent finds are personally examined the patient's PET CT scan and reviewed the radiology report.  I note that the patient has mildly enlarged retroperitoneal lymph nodes that are PET negative.  The lesion in the distal esophagus and proximal stomach is PET positive.  There is no activity in the liver and there is no activity in the mediastinal or retroperitoneal lymph nodes.    Lab Results:  Lab Results (last 24 hours)     ** No results found for the last 24 hours. **           Meds    Current Outpatient Medications:   •  apixaban (ELIQUIS) 2.5 MG tablet tablet, Take 2 tablets by mouth Every 12 (Twelve) Hours., Disp: 120 tablet, Rfl: 3  •  lisinopril (PRINIVIL,ZESTRIL) 10 MG tablet, lisinopril 10 mg tablet  TAKE 1 TABLET BY MOUTH ONCE DAILY, Disp: , Rfl:   •  omeprazole (priLOSEC) 40 MG capsule, Take 40 mg by mouth Daily., Disp: , Rfl:          Assessment/Plan     #1 esophageal adenocarcinoma with signet ring characteristics involving the distal esophagus and proximal stomach  2.  35 pound weight loss worrisome for extensive history of dysphasia and disease.  3.  Multiple bilateral pulmonary emboli most likely secondary to malignancy.  4.  Anticoagulation therapy with Eliquis.  Patient has been treated for 2 weeks.    The patient has excellent venous access with usable arm veins.  He does not require a Mediport at this time.  His case will be discussed at tumor board.  I favor initiating chemotherapy and reviewing his outside CT scans for consideration of biopsy of his liver.  He should also have endoscopic ultrasound considered.  Particularly if some of the lymph nodes can be biopsied in an area where there is not endoluminal tumor.  This is a potentially life-threatening disease process we need to establish with the patient has stage IV disease or not.    Procedures    Tyree Adkins MD  [facility]  05/28/20  09:30

## 2020-05-29 NOTE — TELEPHONE ENCOUNTER
Called [facility] at [phone] and spoke with the Xray department and they said they would be pushing his most recent images over to the cloud. [facility] will be notified.

## 2020-06-01 NOTE — TELEPHONE ENCOUNTER
----- Message from Jackson Peters MD sent at 5/29/2020  4:50 PM EDT -----  Start oral iron p.o. twice daily

## 2020-06-08 NOTE — ANESTHESIA PREPROCEDURE EVALUATION
Anesthesia Evaluation     Patient summary reviewed and Nursing notes reviewed   NPO Solid Status: > 6 hours  NPO Liquid Status: > 4 hours           Airway   Mallampati: III  TM distance: >3 FB  Neck ROM: full  No difficulty expected  Dental      Pulmonary     breath sounds clear to auscultation  Cardiovascular     Rhythm: regular  Rate: normal    (+) hypertension less than 2 medications, hyperlipidemia,       Neuro/Psych  GI/Hepatic/Renal/Endo    (+)  GERD,      Musculoskeletal     Abdominal     Abdomen: soft.   Substance History      OB/GYN          Other      history of cancer active                  Anesthesia Plan    ASA 3     MAC     intravenous induction     Anesthetic plan, all risks, benefits, and alternatives have been provided, discussed and informed consent has been obtained with: patient.

## 2020-06-09 ENCOUNTER — ON CAMPUS - OUTPATIENT (OUTPATIENT)
Dept: URBAN - METROPOLITAN AREA HOSPITAL 85 | Facility: HOSPITAL | Age: 60
End: 2020-06-09
Payer: COMMERCIAL

## 2020-06-09 DIAGNOSIS — C15.9 MALIGNANT NEOPLASM OF ESOPHAGUS, UNSPECIFIED: ICD-10-CM

## 2020-06-09 DIAGNOSIS — R59.0 LOCALIZED ENLARGED LYMPH NODES: ICD-10-CM

## 2020-06-09 DIAGNOSIS — R94.8 ABNORMAL RESULTS OF FUNCTION STUDIES OF OTHER ORGANS AND SYS: ICD-10-CM

## 2020-06-09 PROCEDURE — 43259 EGD US EXAM DUODENUM/JEJUNUM: CPT | Performed by: INTERNAL MEDICINE

## 2020-06-09 NOTE — ANESTHESIA POSTPROCEDURE EVALUATION
Patient: Tyree Orourke    Procedure Summary     Date:  06/09/20 Room / Location:  [facility] ENDOSCOPY 2 / [facility] ENDOSCOPY    Anesthesia Start:  0659 Anesthesia Stop:  0741    Procedure:  Endoscopic ULTRASOUND (N/A ) Diagnosis:       Esophageal cancer (CMS/HCC)      Abnormal positron emission tomography (PET) scan      (Esophageal cancer (CMS/HCC) [C15.9])      (Abnormal positron emission tomography (PET) scan [R94.8])    Surgeon:  Uriel Singleton MD Provider:  Massiel Keating DO    Anesthesia Type:  MAC ASA Status:  3          Anesthesia Type: MAC    Vitals  Vitals Value Taken Time   /88 6/9/2020  8:12 AM   Temp     Pulse 63 6/9/2020  8:13 AM   Resp 17 6/9/2020  8:10 AM   SpO2 98 % 6/9/2020  8:13 AM   Vitals shown include unvalidated device data.        Post Anesthesia Care and Evaluation    Patient location during evaluation: PACU  Patient participation: complete - patient participated  Level of consciousness: awake  Pain score: 0  Pain management: adequate  Airway patency: patent  Anesthetic complications: No anesthetic complications  PONV Status: none  Cardiovascular status: acceptable  Respiratory status: acceptable  Hydration status: acceptable

## 2020-06-09 NOTE — H&P
GI CONSULT  NOTE:    Referring Provider:    Glenys Brewster MD  [unfilled]    Chief complaint: <principal problem not specified>    Subjective .       Pre op diagnosis  Esophageal cancer (CMS/HCC) [C15.9]  Abnormal positron emission tomography (PET) scan [R94.8]      History of present illness:      Tyree Orourke is a 60 y.o. male who presents today for Procedure(s):  ESOPHAGOGASTRODUODENOSCOPY WITH ULTRASOUND AND FINE NEEDLE ASPIRATION for the indications listed below.     The updated Patient Profile was reviewed prior to the procedure, in conjunction with the Physical Exam, including medical conditions, surgical procedures, medications, allergies, family history and social history.     Pre-operatively, I reviewed the indication(s) for the procedure, the risks of the procedure [including but not limited to: unexpected bleeding possibly requiring hospitalization and/or unplanned repeat procedures, perforation possibly requiring surgical treatment, missed lesions and complications of sedation/MAC (also explained by anesthesia staff)].     I have evaluated the patient for risks associated with the planned anesthesia and the procedure to be performed and find the patient an acceptable candidate for IV sedation.    Multiple opportunities were provided for any questions or concerns, and all questions were answered satisfactorily before any anesthesia was administered. We will proceed with the planned procedure.    Past Medical History:  Past Medical History:   Diagnosis Date   • Esophageal cancer (CMS/HCC)     esophageal    • GERD (gastroesophageal reflux disease)    • Hypertension    • Lupus (CMS/HCC)     skin       Past Surgical History:  Past Surgical History:   Procedure Laterality Date   • ENDOSCOPY     • REPLACEMENT TOTAL KNEE Left 04/2019       Social History:  Social History     Tobacco Use   • Smoking status: Never Smoker   • Smokeless tobacco: Never Used   Substance Use Topics   • Alcohol use: Yes     " Frequency: 2-4 times a month     Comment: Socially   • Drug use: Never       Family History:  Family History   Problem Relation Age of Onset   • Leukemia Mother         CLL       Medications:  Medications Prior to Admission   Medication Sig Dispense Refill Last Dose   • lisinopril (PRINIVIL,ZESTRIL) 10 MG tablet Take 10 mg by mouth Daily. Take preop   6/8/2020 at Unknown time   • omeprazole (priLOSEC) 40 MG capsule Take 40 mg by mouth Daily. May take preop   6/8/2020 at Unknown time   • apixaban (ELIQUIS) 5 MG tablet tablet Take 1 tablet by mouth Every 12 (Twelve) Hours. 60 tablet 3 6/6/2020       Scheduled Meds:  levoFLOXacin (LEVAQUIN) 500 mg/100 mL D5W (premix)      metroNIDAZOLE        Continuous Infusions:  sodium chloride 9 mL/hr Last Rate: 9 mL/hr (06/09/20 0629)     PRN Meds:.sodium chloride    ALLERGIES:  Patient has no known allergies.    ROS:  The following systems were reviewed and negative;  Constitution:  No fevers, chills, no unintentional weight loss  Skin: no rash, no jaundice  Eyes:  No blurry vision, no eye pain  HENT:  No change in hearing or smell  Resp:  No dyspnea or cough  CV:  No chest pain or palpitations  :  No dysuria, hematuria  Musculoskeletal:  No leg cramps or arthralgias  Neuro:  No tremor, no numbness  Psych:  No depression or confsuion    Objective     Vital Signs:   Vitals:    06/02/20 1440 06/09/20 0620   BP:  109/79   BP Location:  Left arm   Patient Position:  Lying   Pulse:  83   Resp:  15   Temp:  98.6 °F (37 °C)   TempSrc:  Oral   SpO2:  98%   Weight: 83.9 kg (185 lb) 82.1 kg (181 lb)   Height: 180.3 cm (71\") 177.8 cm (70\")       Physical Exam:       General Appearance:    Awake and alert, in no acute distress   Head:    Normocephalic, without obvious abnormality, atraumatic   Throat:   No oral lesions, no thrush, oral mucosa moist   Lungs:     respirations regular, even and unlabored   Skin:   No rash, no jaundice       Results Review:  Lab Results (last 24 hours)     ** " No results found for the last 24 hours. **          Imaging Results (Last 24 Hours)     ** No results found for the last 24 hours. **           I reviewed the patient's labs and imaging.    ASSESSMENT AND PLAN:      Active Problems:    * No active hospital problems. *       Procedure(s):  ESOPHAGOGASTRODUODENOSCOPY WITH ULTRASOUND AND FINE NEEDLE ASPIRATION      I discussed the patients findings and my recommendations with the patient.    Uriel Singleton MD  06/09/20  07:12

## 2020-06-09 NOTE — OP NOTE
ESOPHAGOGASTRODUODENOSCOPY WITH ULTRASOUND AND FINE NEEDLE ASPIRATION Procedure Report    Patient Name:  Tyree Orourke  YOB: 1960    Date of Surgery:  6/9/2020     Pre-Op Diagnosis:  Esophageal cancer (CMS/HCC) [C15.9]  Abnormal positron emission tomography (PET) scan [R94.8]       Post-Op Diagnosis Codes:  Esophageal mass  Celiac axis lymphadenopathy      Procedure/CPT® Codes:      Procedure(s):  Endoscopic ULTRASOUND    Staff:  Surgeon(s):  Uriel Singleton MD      Anesthesia: Monitored Anesthesia Care    Description of Procedure:  A description of the procedure as well as risks, benefits and alternative methods were explained to the patient who voiced understanding and signed the corresponding consent form. A physical exam was performed and vital signs were monitored throughout the procedure.    A pentex radial echoendoscope was advanced into the mouth, esophagus, and into the stomach. The celiac axis was visualized, next the scope was used to visualize the pancreatic neck, body, and tail as well as the L lobe of the liver. The scope was advanced into the duodenal bulb where the pancreatic head and ampulla were visualized as well as the biliary tree and R lobe of the liver.  Her attention was then focused on the GE junction mass in the celiac axis.  This area was examined closely on a high frequency.  There are at least 4 lymph nodes in the celiac axis just behind the cardia portion of the GE junction mass.  The largest measuring 1.7 cm.  They are hypoechoic and round suggestive of underlying malignancy.  These could not be biopsied as you would have to go through the mass in order to biopsy them.    Impression:  EGD findings:  1.  A large GE junction mass starting at 36 cm and traversing into the stomach/cardia.  The majority of this has broken into or through the muscularis propria and there is a location where there is a loss of plane between the liver and the mass suggestive of possible  invasion of the hepatic capsule making this a T4 lesion.  2.  At least 4 lymph nodes in the celiac axis were visualized with the largest measuring 1.7 cm.  They are round and hypoechoic suggestive of underlying malignancy.  These cannot be biopsied as they were directly behind the mass.  The needle would have to go through the mass in order to reach the lymph nodes.  3.  Normal pancreatic uncinate head neck body and tail.  4.  Normal ampulla and common bile duct    Recommendations:  1.  This is likely a T4N2 lesion        Uriel Singleton MD     Date: 6/9/2020    Time: 07:42

## 2020-06-12 PROBLEM — C15.5 MALIGNANT NEOPLASM OF LOWER THIRD OF ESOPHAGUS (HCC): Status: ACTIVE | Noted: 2020-01-01

## 2020-06-12 NOTE — PROGRESS NOTES
Verbal order per Dr. Peters to enter FLOT regimen.  Treatment plan entered and sent to Dr. Peters for review and signature.

## 2020-06-12 NOTE — PROGRESS NOTES
HEMATOLOGY ONCOLOGY OUTPATIENT FOLLOW UP       Patient name: Tyree Orourke  : 1960  MRN: [id number]  Primary Care Physician: Glenys Brewster MD  Referring Physician: Glenys Brewster MD  Reason For Consult:     Chief Complaint   Patient presents with   • Follow-up     Malignant neoplasm of esophagus, unspecified location    Poorly differentiated GE junction adenocarcinoma.      HPI:   History of Present Illness:  Tyree Orourke is 60 y.o. 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  He reports symptoms of GERD  many years ago, took Tums regularly then went away; about 15 years remotely.  He unfortunately developed GI symptoms about 2 months ago.  His weight down 20 lbs in 2 months. He denied any pain or heartburn.  Over the last two months, he does report SOB easily with exertion.  He saw his PCP Dr. Brewster who ordered work-up. He has had the following imaging and work-up/biospy:.  Patient is not a smoker and quit about 20 years ago.  He works as a supervisor at [employer].    · 2020: Creatinine 0.85, ALT 15, AST 21 alk phos 100, bilirubin 0.6, albumin 3.7, calcium 9.0,  · 2020: Patient underwent upper GI endoscopy: Surgical pathology of gastric fundus biopsy shows poorly differentiated carcinoma with signet ring cell features involving cardio oxyntic mucosa.  Gastric antrum biopsy shows chronic gastritis.  Negative for H. Pylori.  · 2020: CT chest and abdomen with contrast: There is ill-defined low-density lesion posteriorly in the right lobe of the liver which could be a metastatic lesion, measuring 2 cm,.  There is a significant thickening throughout the GE junction region extending to the level of the proximal stomach, suspicious for tumor.  Soft tissue planes are ill-defined at the inferior aspect of this area due to tumor  infiltration.  There is moderate adenopathy in the retroperitoneum and periceliac region.  Evidence of PE.    · 5/14/2020: CT angiogram of the chest with contrast: Bilateral segmental pulmonary emboli without definitive evidence of right heart strain.  Small nodule along the left major fissure consistent with a intrapulmonary lymph node.  Redemonstration of lower esophageal/gastric mass with surrounding lymphadenopathy and a hypoattenuating lesion in the right hepatic lobe.      · 5/18/2020: The patient is here for his Initial Consultation  appointment with Dr. Peters from Dr. Pettit. He has had symptoms for about 2 months. The patient denies a lot of pain. The patient has lost at least 20lbs since the symptoms started. He denies any heartburn and has been taking Zantac BID to help prevent developing it. He will d/c that and start taking Pepcid. He says that he gets SOB easily with exertion. The patient denies any past medical problems except Lupus and high blood pressure. The patient stated he is supposed to fly out on a trip to [place] tomorrow until Saturday for a golf trip. The patient is being given Eliquis samples 10mg BID for one week and then 5mg BID after that.   · 5/18/2020: Patient started Eliquis.    · 5/26/2020: PET CT scan: There is metabolic activity around the area of the GE junction with thickening of the wall of the bowel in this area.  Maximum SUV 4.6.  No areas of abnormal metabolic activity are seen within the abdomen or pelvis.  There are gallstones within the gallbladder without inflammation.  · 5/28/2020 CEA 13.3, d-dimer 3.28 high, ferritin 104.7, iron 31 low, iron saturation 9% low, TIBC 359,  · 5/29/2020: MRI abdomen with and without contrast: Hemangioma within the right lobe of the liver measuring 2.1 cm.  Stable appearance of multiple retroperitoneal lymph nodes, some of which are considered pathological based on size.  They would be suspicious for metastasis.  Nodular thickening and  enhancement of the distal esophagus and fundus of the stomach.    · 6/9/2020: Endoscopic ultrasound by Dr. Singleton.:A large GE junction mass starting at 36 cm and traversing into the stomach/cardia.  The majority of this has broken into or through the muscularis propria and there is a location where there is a loss of plane between the liver and the mass suggestive of possible invasion of the hepatic capsule making this a T4 lesion.  At least 4 lymph nodes in the celiac axis were visualized with the largest measuring 1.7 cm.  They are round and hypoechoic suggestive of underlying malignancy.  These cannot be biopsied as they were directly behind the mass.   Normal pancreatic uncinate head, neck body and tail.  Normal ampulla and common bile duct.        Subjective:   The patient is here for follow up appointment for esophogeal cancer. He is accompanied by his wife at today's visit. He has undergone recent scans performed on 05/29/20. He has yet to get a port placed. He denies any heartburn, constipation, or diarrhea. Treatment options were discussed at today's visit. He is currently taking Eliquis BID--He has been taking 2.5mg BID, but will be increased to 5mg BID. His wife states they are unable to find OTC Iron; prescription was sent today. He states he's lost thirty pounds in the past three months. They have trouble eating certain things. He experiences nausea after eating some solid foods. He has been trying to drink on Protein Plus daily.  He denies any leg swelling. He still has shortness of breath with exertion.    We discussed the EUS results.    The following portions of the patient's history were reviewed and updated as appropriate: allergies, current medications, past family history, past medical history, past social history, past surgical history and problem list.    Past Medical History:   Diagnosis Date   • Esophageal cancer (CMS/HCC)     esophageal    • GERD (gastroesophageal reflux disease)    •  Hypertension    • Lupus (CMS/HCC)     skin       Past Surgical History:   Procedure Laterality Date   • ENDOSCOPY     • REPLACEMENT TOTAL KNEE Left 04/2019   • UPPER ENDOSCOPIC ULTRASOUND W/ FNA N/A 6/9/2020    Procedure: Endoscopic ULTRASOUND;  Surgeon: Uriel Singleton MD;  Location: [facility] ENDOSCOPY;  Service: Gastroenterology;  Laterality: N/A;  Post operative diagnosis: mass, lymph nodes         Current Outpatient Medications:   •  apixaban (ELIQUIS) 5 MG tablet tablet, Take 1 tablet by mouth Every 12 (Twelve) Hours. (Patient taking differently: Take 5 mg by mouth Daily.), Disp: 60 tablet, Rfl: 3  •  ferrous sulfate 325 (65 FE) MG tablet, Take 325 mg by mouth Daily., Disp: , Rfl:   •  lisinopril (PRINIVIL,ZESTRIL) 10 MG tablet, Take 10 mg by mouth Daily. Take preop, Disp: , Rfl:   •  omeprazole (priLOSEC) 40 MG capsule, Take 40 mg by mouth Daily. May take preop, Disp: , Rfl:   •  ferrous gluconate (FERGON) 324 MG tablet, Take 1 tablet by mouth 2 (two) times a day., Disp: 60 tablet, Rfl: 0    No Known Allergies    Family History   Problem Relation Age of Onset   • Leukemia Mother         CLL       Cancer-related family history is not on file.    Social History     Tobacco Use   • Smoking status: Never Smoker   • Smokeless tobacco: Never Used   Substance Use Topics   • Alcohol use: Yes     Frequency: 2-4 times a month     Comment: Socially   • Drug use: Never     Social History     Social History Narrative    Patient lives in [place], [place], with his wife. He has one child. He does not smoke. He works at [employer].        ROS:     Review of Systems   Constitutional: Positive for unexpected weight change. Negative for activity change, chills and fever.   HENT: Positive for trouble swallowing. Negative for drooling, ear discharge, mouth sores, nosebleeds and sore throat.    Eyes: Negative for photophobia, pain, redness and visual disturbance.   Respiratory: Positive for shortness of breath. Negative for cough and  "wheezing.    Cardiovascular: Negative for chest pain, palpitations and leg swelling.   Gastrointestinal: Negative for abdominal pain, diarrhea, nausea and vomiting.        Dysphagia, weight loss   Genitourinary: Negative for dysuria.   Musculoskeletal: Negative for neck stiffness.   Skin: Negative for rash.   Neurological: Negative for seizures, syncope and speech difficulty.   Psychiatric/Behavioral: Negative for agitation, confusion and hallucinations.       I have reviewed and confirmed the accuracy of the patient's history which includes chief complaint, subjective, HPI, ROS,as entered by the MA/LPN/RN.    Objective:    Vitals:    06/12/20 1228   BP: 128/79   Pulse: 83   Resp: 18   Temp: 98.2 °F (36.8 °C)   TempSrc: Temporal   Weight: 81.6 kg (180 lb)   Height: 177.8 cm (70\")   PainSc: 0-No pain     Body mass index is 25.83 kg/m².  ECOG  (0) Fully active, able to carry on all predisease performance without restriction    Physical Exam:     Physical Exam   Constitutional: He is oriented to person, place, and time. He appears well-developed and well-nourished. No distress.   HENT:   Head: Normocephalic and atraumatic.   Eyes: Conjunctivae and EOM are normal. Right eye exhibits no discharge. Left eye exhibits no discharge. No scleral icterus.   Neck: Normal range of motion. Neck supple. No thyromegaly present.   Cardiovascular: Normal rate, regular rhythm and normal heart sounds. Exam reveals no gallop and no friction rub.   Pulmonary/Chest: Effort normal. No stridor. No respiratory distress. He has no wheezes.   Abdominal: Soft. Bowel sounds are normal. He exhibits no mass. There is no tenderness. There is no rebound and no guarding.   Musculoskeletal: Normal range of motion. He exhibits no tenderness.   Lymphadenopathy:     He has no cervical adenopathy.   Neurological: He is alert and oriented to person, place, and time. He exhibits normal muscle tone.   Skin: Skin is warm. No rash noted. He is not diaphoretic. " No erythema.   Psychiatric: He has a normal mood and affect. His behavior is normal.   Nursing note and vitals reviewed.            Lab Results - Last 18 Months   Lab Units 05/28/20  1332   WBC 10*3/mm3 9.63   HEMOGLOBIN g/dL 13.6   HEMATOCRIT % 40.3   PLATELETS 10*3/mm3 388   MCV fL 87.8     Lab Results - Last 18 Months   Lab Units 05/28/20  1332   SODIUM mmol/L 135*   POTASSIUM mmol/L 4.2   CHLORIDE mmol/L 99   CO2 mmol/L 22.0   BUN mg/dL 20   CREATININE mg/dL 0.98   CALCIUM mg/dL 9.7   BILIRUBIN mg/dL 0.3   ALK PHOS U/L 92   ALT (SGPT) U/L 16   AST (SGOT) U/L 19   GLUCOSE mg/dL 117*       Lab Results   Component Value Date    GLUCOSE 117 (H) 05/28/2020    BUN 20 05/28/2020    CREATININE 0.98 05/28/2020    EGFRIFNONA 78 05/28/2020    BCR 20.4 05/28/2020    K 4.2 05/28/2020    CO2 22.0 05/28/2020    CALCIUM 9.7 05/28/2020    ALBUMIN 4.10 05/28/2020    AST 19 05/28/2020    ALT 16 05/28/2020       No results for input(s): APTT, INR, PTT in the last 45031 hours.    Lab Results   Component Value Date    IRON 31 (L) 05/28/2020    TIBC 359 05/28/2020    FERRITIN 104.70 05/28/2020       No results found for: FOLATE    No results found for: OCCULTBLD    No results found for: RETICCTPCT    No results found for: KSUDTGEE74  No results found for: SPEP, UPEP  No results found for: LDH, URICACID  No results found for: ALFREDA, RF, SEDRATE  No results found for: FIBRINOGEN, HAPTOGLOBIN  No results found for: PTT, INR  No results found for:   Lab Results   Component Value Date    CEA 13.30 05/28/2020     No components found for: CA-19-9  No results found for: PSA          Assessment/Plan     Assessment:  1. 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  He does have retroperitoneal lymph node involvement as well as celiac lymph node metastases.  Mass causing liver capsule invasion.  Patient presented with dysphagia and has 30 pound weight  loss.  2. Pulmonary embolism: Started on Eliquis .  Thrombosis very likely secondary to malignancy.   3. Iron deficiency  4. Weight loss: G-tube may be considered if he continues to lose weight.          Plan:  1. I discussed the case today with Dr. Cee, Dr. Shook, Dr. Adkins, and Dr. Singleton.  2. Discussed with patient this is likely stage IV disease due to retroperitoneal lymph node involvement.  He has lymph nodes near the renal veins that are potentially involved.  However we will do our best attempts at downstaging his disease  3. After discussing with surgery, plan is to proceed with neoadjuvant chemotherapy.  4. Would benefit from a laparoscopic staging.  However he recently had a PE and therefore that cannot be done.  5. Start iron supplementation.  6. We will arrange Port-A-Cath placement ASAP with Dr. Adkins.  7. Eliquis 5 twice daily.    8. His case was also discussed in the recent tumor board meeting.  9. Discussed neoadjuvant chemotherapy with the FLOT chemo regimen.   10. We will follow-up in 2 weeks                     I spent more than 45 minutes on his encounter and more than 50% of the time was spent on face-to-face counseling.     Thank you very much for providing the opportunity to participate in this patient’s care. Please do not hesitate to call if there are any other questions      I have reviewed all relevant outside reports, notes, labs results, imaging, vitals, medications and plan with the patient today.    Portions of the note have been Scribed by medical assistant/Nurse.  I have reviewed and made changes accordingly.      Electronically signed by Jackson Peters MD, 05/18/20, 12:06 PM.

## 2020-06-15 NOTE — ANESTHESIA POSTPROCEDURE EVALUATION
Patient: Tyree Orourke    Procedure Summary     Date:  06/15/20 Room / Location:  [facility] OR 09 / [facility] MAIN OR    Anesthesia Start:  0945 Anesthesia Stop:  1021    Procedure:  INSERTION VENOUS ACCESS DEVICE LEFT SUBCLAVIAN UNDER FLOUROSCOPIC GUIDANCE (Left ) Diagnosis:       Malignant neoplasm of lower third of esophagus (CMS/HCC)      (Malignant neoplasm of lower third of esophagus (CMS/HCC) [C15.5])    Surgeon:  Tyree Adkins MD Provider:  Massiel Keating DO    Anesthesia Type:  MAC ASA Status:  3          Anesthesia Type: MAC    Vitals  Vitals Value Taken Time   /68 6/15/2020 10:20 AM   Temp     Pulse 83 6/15/2020 10:20 AM   Resp     SpO2 98 % 6/15/2020 10:20 AM   Vitals shown include unvalidated device data.        Post Anesthesia Care and Evaluation    Patient location during evaluation: PACU  Patient participation: complete - patient participated  Level of consciousness: awake  Pain score: 0  Pain management: adequate  Airway patency: patent  Anesthetic complications: No anesthetic complications  PONV Status: none  Cardiovascular status: acceptable  Respiratory status: acceptable  Hydration status: acceptable

## 2020-06-15 NOTE — INTERVAL H&P NOTE
Patient has been anticoagulated.  Anticoagulation now held for Mediport placement for long-term chemotherapy.  H&P reviewed. The patient was examined and there are no changes to the H&P.

## 2020-06-15 NOTE — DISCHARGE INSTRUCTIONS
Elevate head of bed 30 degrees.  Follow-up in office in 3 weeks.  Resume taking Eliquis in 2 days provided the wound is not bleeding.

## 2020-06-15 NOTE — OP NOTE
INSERTION VENOUS ACCESS DEVICE  Procedure Report    Patient Name:  Tyree Orourke  YOB: 1960    Date of Surgery:  6/15/2020     Indications: Signet ring gastric carcinoma-GE junction carcinoma    Pre-op Diagnosis:   Gastric carcinoma signet ring       Post-Op Diagnosis Codes:  Gastric carcinoma signet ring    Procedure/CPT® Codes:      Procedure(s):  INSERTION VENOUS ACCESS DEVICE LEFT SUBCLAVIAN UNDER FLOUROSCOPIC GUIDANCE    Staff:  Surgeon(s):  Tyree Adkins MD         Anesthesia: Monitored Anesthesia Care    Estimated Blood Loss: minimal    Implants:    Implant Name Type Inv. Item Serial No.  Lot No. LRB No. Used   POWERPORT MRI CATH/POLY INTERMED 1L [employer]ART[employer]N/H 8F CLR - [id number] Implant POWERPORT MRI CATH/POLY INTERMED 1L [employer]ART[employer]N/H 8F CLR  [place] [employer] [id number] Left 1       Specimen:          None        Findings: Good catheter position and function    Complications: None    Description of Procedure: The patient was prepped and draped in a standard sterile fashion an OR timeout was taken all were in agreement we then proceeded.  The left subclavian vein was cannulated in routine fashion a dilator and sheath were advanced over the guidewire.  The dilator and the guidewire were removed with the catheter was introduced the sheath sheath was peeled away and the catheter was tunneled to the Mediport pocket.  Under fluoroscopic guidance the catheter was adjusted so the tip resided in the superior vena cava.  The catheter aspirated and flushed nicely with heparinized saline.  The catheter was connected to the med port. The med port fit snugly in the med port pocket.  The wound was closed in layers around with absorbable suture.  Final fluoroscopy confirmed good placement of the catheter no pneumothorax and no kinking of the catheter.      Tyree Adkins MD     Date: 6/15/2020  Time: 10:13

## 2020-06-15 NOTE — ANESTHESIA PREPROCEDURE EVALUATION
Anesthesia Evaluation     Patient summary reviewed and Nursing notes reviewed   NPO Solid Status: > 8 hours  NPO Liquid Status: > 2 hours           Airway   Mallampati: I  TM distance: >3 FB  Neck ROM: full  No difficulty expected  Dental      Pulmonary     breath sounds clear to auscultation  Cardiovascular     ECG reviewed  PT is on anticoagulation therapy  Rhythm: regular  Rate: normal    (+) hypertension, hyperlipidemia,     ROS comment: 6/9/20 NSR rate ~85    Neuro/Psych  GI/Hepatic/Renal/Endo    (+)  GERD,      Musculoskeletal     Abdominal    Substance History      OB/GYN          Other      history of cancer active                    Anesthesia Plan    ASA 3     MAC     intravenous induction     Anesthetic plan, all risks, benefits, and alternatives have been provided, discussed and informed consent has been obtained with: patient.

## 2020-06-26 NOTE — PROGRESS NOTES
HEMATOLOGY ONCOLOGY OUTPATIENT FOLLOW UP       Patient name: Tyree Orourke  : 1960  MRN: [id number]  Primary Care Physician: Glenys Brewster MD  Referring Physician: Glenys Brewster MD  Reason For Consult:     Chief Complaint   Patient presents with   • Follow-up     Malignant neoplasm of esophagus, unspecified location    Poorly differentiated GE junction adenocarcinoma.      HPI:   History of Present Illness:  Tyree Orourke is 60 y.o. 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  He reports symptoms of GERD  many years ago, took Tums regularly then went away; about 15 years remotely.  He unfortunately developed GI symptoms about 2 months ago.  His weight down 20 lbs in 2 months. He denied any pain or heartburn.  Over the last two months, he does report SOB easily with exertion.  He saw his PCP Dr. Brewster who ordered work-up. He has had the following imaging and work-up/biospy:.  Patient is not a smoker and quit about 20 years ago.  He works as a supervisor at [employer].    · 2020: Creatinine 0.85, ALT 15, AST 21 alk phos 100, bilirubin 0.6, albumin 3.7, calcium 9.0,  · 2020: Patient underwent upper GI endoscopy: Surgical pathology of gastric fundus biopsy shows poorly differentiated carcinoma with signet ring cell features involving cardio oxyntic mucosa.  Gastric antrum biopsy shows chronic gastritis.  Negative for H. Pylori.  · 2020: CT chest and abdomen with contrast: There is ill-defined low-density lesion posteriorly in the right lobe of the liver which could be a metastatic lesion, measuring 2 cm,.  There is a significant thickening throughout the GE junction region extending to the level of the proximal stomach, suspicious for tumor.  Soft tissue planes are ill-defined at the inferior aspect of this area due to tumor  infiltration.  There is moderate adenopathy in the retroperitoneum and periceliac region.  Evidence of PE.    · 5/14/2020: CT angiogram of the chest with contrast: Bilateral segmental pulmonary emboli without definitive evidence of right heart strain.  Small nodule along the left major fissure consistent with a intrapulmonary lymph node.  Redemonstration of lower esophageal/gastric mass with surrounding lymphadenopathy and a hypoattenuating lesion in the right hepatic lobe.      · 5/18/2020: The patient is here for his Initial Consultation  appointment with Dr. Peters from Dr. Pettit. He has had symptoms for about 2 months. The patient denies a lot of pain. The patient has lost at least 20lbs since the symptoms started. He denies any heartburn and has been taking Zantac BID to help prevent developing it. He will d/c that and start taking Pepcid. He says that he gets SOB easily with exertion. The patient denies any past medical problems except Lupus and high blood pressure. The patient stated he is supposed to fly out on a trip to [place] tomorrow until Saturday for a golf trip. The patient is being given Eliquis samples 10mg BID for one week and then 5mg BID after that.   · 5/18/2020: Patient started Eliquis.    · 5/26/2020: PET CT scan: There is metabolic activity around the area of the GE junction with thickening of the wall of the bowel in this area.  Maximum SUV 4.6.  No areas of abnormal metabolic activity are seen within the abdomen or pelvis.  There are gallstones within the gallbladder without inflammation.  · 5/28/2020 CEA 13.3, d-dimer 3.28 high, ferritin 104.7, iron 31 low, iron saturation 9% low, TIBC 359,  · 5/29/2020: MRI abdomen with and without contrast: Hemangioma within the right lobe of the liver measuring 2.1 cm.  Stable appearance of multiple retroperitoneal lymph nodes, some of which are considered pathological based on size.  They would be suspicious for metastasis.  Nodular thickening and  enhancement of the distal esophagus and fundus of the stomach.    · 6/9/2020: Endoscopic ultrasound by Dr. Singleton.:A large GE junction mass starting at 36 cm and traversing into the stomach/cardia.  The majority of this has broken into or through the muscularis propria and there is a location where there is a loss of plane between the liver and the mass suggestive of possible invasion of the hepatic capsule making this a T4 lesion.  At least 4 lymph nodes in the celiac axis were visualized with the largest measuring 1.7 cm.  They are round and hypoechoic suggestive of underlying malignancy.  These cannot be biopsied as they were directly behind the mass.   Normal pancreatic uncinate head, neck body and tail.  Normal ampulla and common bile duct.  · 6/15/2020: WBC 7.8, hemoglobin 13.1, platelets 345, MCV 84.2, creatinine 0.83,    Subjective:     Patient presents for follow-up.  He continues to lose weight.  He lost another 3 pounds.  Chemotherapy has been approved.  He is scheduled to start FLOT.  He has not had any official consultation with Dr. Shook yet.  Status post Port-A-Cath placement.  Continues to have some nausea when he eats some solid foods.  Drinking protein shakes.    The following portions of the patient's history were reviewed and updated as appropriate: allergies, current medications, past family history, past medical history, past social history, past surgical history and problem list.    Past Medical History:   Diagnosis Date   • Esophageal cancer (CMS/HCC)     esophageal    • GERD (gastroesophageal reflux disease)    • Hypertension    • Lupus (CMS/HCC)     skin       Past Surgical History:   Procedure Laterality Date   • ENDOSCOPY     • REPLACEMENT TOTAL KNEE Left 04/2019   • UPPER ENDOSCOPIC ULTRASOUND W/ FNA N/A 6/9/2020    Procedure: Endoscopic ULTRASOUND;  Surgeon: Uriel Singleton MD;  Location: [facility] ENDOSCOPY;  Service: Gastroenterology;  Laterality: N/A;  Post operative diagnosis: mass,  lymph nodes   • VENOUS ACCESS DEVICE (PORT) INSERTION Left 6/15/2020    Procedure: INSERTION VENOUS ACCESS DEVICE LEFT SUBCLAVIAN UNDER FLOUROSCOPIC GUIDANCE;  Surgeon: Tyree Adkins MD;  Location: [facility] MAIN OR;  Service: Cardiothoracic;  Laterality: Left;         Current Outpatient Medications:   •  apixaban (ELIQUIS) 5 MG tablet tablet, Take 1 tablet by mouth Every 12 (Twelve) Hours. (Patient taking differently: Take 5 mg by mouth Daily.), Disp: 60 tablet, Rfl: 3  •  ferrous gluconate (FERGON) 324 MG tablet, Take 1 tablet by mouth 2 (two) times a day., Disp: 60 tablet, Rfl: 0  •  ferrous sulfate 325 (65 FE) MG tablet, Take 325 mg by mouth Daily., Disp: , Rfl:   •  lisinopril (PRINIVIL,ZESTRIL) 10 MG tablet, Take 10 mg by mouth Daily. Take preop, Disp: , Rfl:   •  omeprazole (priLOSEC) 40 MG capsule, Take 40 mg by mouth Daily. May take preop, Disp: , Rfl:     No Known Allergies    Family History   Problem Relation Age of Onset   • Leukemia Mother         CLL       Cancer-related family history is not on file.    Social History     Tobacco Use   • Smoking status: Never Smoker   • Smokeless tobacco: Never Used   Substance Use Topics   • Alcohol use: Yes     Frequency: 2-4 times a month     Comment: Socially   • Drug use: Never     Social History     Social History Narrative    Patient lives in [place], [place], with his wife. He has one child. He does not smoke. He works at [employer].        ROS:     Review of Systems   Constitutional: Negative for activity change, chills and fever.   HENT: Negative for drooling, ear discharge, mouth sores, nosebleeds and sore throat.    Eyes: Negative for photophobia, pain, redness and visual disturbance.   Respiratory: Negative for cough, choking, shortness of breath and wheezing.    Cardiovascular: Negative for chest pain and palpitations.   Gastrointestinal: Negative for abdominal pain, diarrhea, nausea and vomiting.   Genitourinary: Negative for dysuria.   Musculoskeletal:  "Negative for neck stiffness.   Skin: Negative for rash.   Neurological: Negative for seizures, syncope and speech difficulty.   Psychiatric/Behavioral: Negative for agitation, confusion and hallucinations.     I have reviewed and confirmed the accuracy of the patient's history which includes chief complaint, subjective, HPI, ROS,as entered by the MA/LPN/RN.    Objective:    Vitals:    06/26/20 1214   BP: 131/78   Pulse: 85   Resp: 18   Temp: 98.7 °F (37.1 °C)   Weight: 81.3 kg (179 lb 3.2 oz)   Height: 177.8 cm (70\")   PainSc: 0-No pain     Body mass index is 25.71 kg/m².  ECOG  (0) Fully active, able to carry on all predisease performance without restriction    Physical Exam:     Physical Exam   Constitutional: He is oriented to person, place, and time. He appears well-developed and well-nourished. No distress.   HENT:   Head: Normocephalic and atraumatic.   Eyes: Conjunctivae and EOM are normal. Right eye exhibits no discharge. Left eye exhibits no discharge. No scleral icterus.   Neck: Normal range of motion. Neck supple. No thyromegaly present.   Cardiovascular: Normal rate, regular rhythm and normal heart sounds. Exam reveals no gallop and no friction rub.   Pulmonary/Chest: Effort normal. No stridor. No respiratory distress. He has no wheezes.   Abdominal: Soft. Bowel sounds are normal. He exhibits no mass. There is no tenderness. There is no rebound and no guarding.   Musculoskeletal: Normal range of motion. He exhibits no tenderness.   Lymphadenopathy:     He has no cervical adenopathy.   Neurological: He is alert and oriented to person, place, and time. He exhibits normal muscle tone.   Skin: Skin is warm. No rash noted. He is not diaphoretic. No erythema.   Psychiatric: He has a normal mood and affect. His behavior is normal.   Nursing note and vitals reviewed.            Lab Results - Last 18 Months   Lab Units 06/15/20  0902 05/28/20  1332   WBC 10*3/mm3 7.80 9.63   HEMOGLOBIN g/dL 13.1 13.6   HEMATOCRIT " % 38.0 40.3   PLATELETS 10*3/mm3 345 388   MCV fL 84.2 87.8     Lab Results - Last 18 Months   Lab Units 06/15/20  0900 05/28/20  1332   SODIUM mmol/L 137 135*   POTASSIUM mmol/L 3.9 4.2   CHLORIDE mmol/L 104 99   CO2 mmol/L 21.0* 22.0   BUN  11 20   CREATININE mg/dL 0.83 0.98   CALCIUM mg/dL 9.6 9.7   BILIRUBIN mg/dL  --  0.3   ALK PHOS U/L  --  92   ALT (SGPT) U/L  --  16   AST (SGOT) U/L  --  19   GLUCOSE mg/dL 132* 117*       Lab Results   Component Value Date    GLUCOSE 132 (H) 06/15/2020    BUN  06/15/2020      Comment:      Testing performed by alternate method    BUN 11 06/15/2020    CREATININE 0.83 06/15/2020    EGFRIFNONA 95 06/15/2020    BCR  06/15/2020      Comment:      Testing not performed.    K 3.9 06/15/2020    CO2 21.0 (L) 06/15/2020    CALCIUM 9.6 06/15/2020    ALBUMIN 4.10 05/28/2020    AST 19 05/28/2020    ALT 16 05/28/2020       No results for input(s): APTT, INR, PTT in the last 88883 hours.    Lab Results   Component Value Date    IRON 31 (L) 05/28/2020    TIBC 359 05/28/2020    FERRITIN 104.70 05/28/2020       No results found for: FOLATE    No results found for: OCCULTBLD    No results found for: RETICCTPCT    No results found for: PVHSGTLG38  No results found for: SPEP, UPEP  No results found for: LDH, URICACID  No results found for: ALFREDA, RF, SEDRATE  No results found for: FIBRINOGEN, HAPTOGLOBIN  No results found for: PTT, INR  No results found for:   Lab Results   Component Value Date    CEA 13.30 05/28/2020     No components found for: CA-19-9  No results found for: PSA          Assessment/Plan     Assessment:  1. 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  He does have retroperitoneal lymph node involvement as well as celiac lymph node metastases.  Mass causing liver capsule invasion.  Patient presented with dysphagia and has 30 pound weight loss.  2. Pulmonary embolism: Started on Eliquis .   Thrombosis very likely secondary to malignancy.   3. Iron deficiency  4. Weight loss: G-tube may be considered if he continues to lose weight.          Plan:  1. Awaiting to start chemotherapy with FLOT.  Risks and benefits of chemotherapy discussed with the patient.  Explained that his disease is stage IV but we are also trying to look at downstaging him with the chemotherapy and also potentially consider surgical options down the road.  2. We will have official consultation with Dr. Shook. Would benefit from a laparoscopic staging.  However he recently had a PE and therefore that could not be set up.  3. Continue iron supplementation.  4. Continue Eliquis 5 twice daily.    5. We will redraw CEA and CA-19-9  6. His case was also discussed in the recent tumor board meeting.  7. Starting neoadjuvant chemotherapy with the FLOT chemo regimen on Monday.   8. We will follow-up in 3 weeks                         I have reviewed all relevant outside reports, notes, labs results, imaging, vitals, medications and plan with the patient today.    Portions of the note have been Scribed by medical assistant/Nurse.  I have reviewed and made changes accordingly.      Electronically signed by Jackson Peters MD, 05/18/20, 12:06 PM.

## 2020-06-29 NOTE — PROGRESS NOTES
Education for Administration of Chemotherapy and/or Biotherapy     NAME: Tyree Orourke  : 1960  MRN: [id number]  DATE OF SERVICE: 2020  REASON FOR VISIT: PATIENT EDUCATION    Mr. Tyree Orourke is here today for education on his upcoming chemotherapy and/or biotherapy recommended for treatment of his disease. The patient was accompanied by his wife.     I reviewed treatment options, obtained signed consent, and answered any questions he had regarding the administration of docetaxel, oxaliplatin, leucovorin, and fluorouracil.     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    TREATMENT GOALS:  The goal of the treatment is to:    [x] Curative intent - intent is cure; cure implies patient survival will not be restricted by current cancer diagnosis   [] Control  - intent is to extend survival but not long enough to meet definition of cure for patient with that diagnosis   [] Palliative - means treatment given in a non-curative setting to optimize symptom control, improve quality of life, and improve survival    This treatment has been explained to Tyree Orourke. Alternative methods of treatment, if any, have been explained to Tyree Orourke, as have the benefits and risks of each. Based on the physician's explanation of the benefits and risks of this treatment and any alternatives available, the patient agrees the potential benefits outweighs the potential risks involved. I have explained to the patient the most likely complications which might occur from this treatment. The patient understands along with the treatment additional medications may be necessary to lessen the side effects.     SIDE EFFECTS:  Possible side effects may include  but are not limited to, any of the following, or a combination of the following:    [x]  Allergic Reaction [x]  Hypersensitivity reaction []  Secondary malignancies   [x]  Anemia []  Hypertension [x]  Sexual side effects    []  Anxiety []  Hypertensive crisis  [x]  Shortness of breath   []  Back pain []  Hypertriglyceridemia [x]  Skin changes   []  Body pain []  Hypoalbuminemia []  Somnolence   []  Blood clots (DVT/PE) []  Hyponatremia  []  Sore throat   [x]  Bleeding []  Immune-mediated reaction [x]  Swelling   [x]  Bone pain [x]  Infection  [x]  Taste changes   [x]  Bruising [x]  Infusion reaction  [x]  Temperature sensitivity   [x]  Cardiovascular events  [x]  Injection site reaction  [x]  Thrombocytopenia   [x]  Central neurotoxicity []  Injection site ulceration []  Thyroid changes   []  Chest pain []  Insomnia []  Tinnitus    [x]  Chills []  Itching []  Upper respiratory tract infection    []  Confusion []  Joint pain []  Visual changes   []  Congestive heart failure []  Kidney damage []  Vitlligo   [x]  Constipation [x]  Leukopenia [x]  Vomiting   []  Cough [x]  LFT imbalances []  Watery eyes   [x]  Diarrhea [x]  Liver damage [x]  Weakness   []  Depression [x]  Loss of appetite [x]  Weight gain   []  Dizziness []  Low blood pressure []  Weight loss   []  Dry skin []  Lung damage [x]  Wound healing complication   [x]  Ecchymosis []  Menopausal symptoms []  Other   []  Electrolyte imbalances []  Menstrual irregularities []  Other   []  Elevated LDH [x]  Mouth sores []  Other   []  Eye irritation []  Muscle aches  []  Other   [x]  Fatigue []  Nephrotic syndrome []  Other   [x]  Fertility effects  [x]  Nail changes []  Other   [x]  Fevers [x]  Nausea  []  Other   []  Fistula formation []  Neck pain  []  Other   []  Flu-like symptoms [x]  Neutropenia []  Other   []  Fluid retention []  Nosebleeds []  Other   []  Forgetfulness [x]  Pain in arms/legs []  Other   []  Gastrointestinal perforation  []  Pericardial  effusion  []  Other   [x]  Hand/Foot syndrome [x]  Peripheral neuropathy []  Other   [x]  Hair loss/discoloration []  Petechiae []  Other   []  Headaches []  Pharyngitis  []  Other   []  Hearing loss/change [x]  Photosensitivity  []  Other   []  Heart damage []  Pleural effusion  []  Other   []  Hematuria []  Proteinuria  []  Other   [x]  Hemorrhage [x]  Rash []  Other     VASCULAR ACCESS:  The patient was educated on the possible need for vascular access/port placement.  The patient was advised although uncommon, leakage of an infused medication from the vein or venous access device (port) may lead to skin breakdown and/or other tissue damage.  The patient was advised he may have pain, bleeding, and/or bruising from the insertion of a needle in their vein or venous access device (port).  The patient was further advised despite proper technique, infection with redness and irritation may rarely occur at the site where the needle was inserted.  The patient was advised if complications occur, additional medical treatment is available.    BLOOD COUNT MONITORING:  While receiving treatment, it has been explained to the patient blood counts will be monitored.  This may include but is not limited to a complete blood count (CBC). The patient may develop neutropenia, anemia, or thrombocytopenia. This has been explained and a handout was provided to the patient.     NUTRITION:   It was explained to the patient about nutrition and its importance while undergoing chemotherapy and/or biotherapy. Certain medications will be prescribed during the treatment which may change the way foods taste or smell. These changes may cause poor or no appetite. Food is fuel for thebody, and if it does not get the fuel it needs, he may become malnourished, which can lead to severe fatigue. It was discussed with the patient about calories and how to add high-calorie foods to his diet.  Protein was also mentioned in regards to how it will help make  new cells for the body. Information was given to Tyree Orourke regarding good protein sources.   It was also discussed with the patient the importance of  eating and drinking every 2-3 hours while awake. We discussed fluid intake of at least 6 to 8 ounce glasses of liquids per day to stay hydrated. Examples are listed below:   Water  Juice (fruit or vegetable)  Soda Sport Drinks Soup   Milk  Ensure, Boost, Glucerna Ice Cream Popsicles Jello   Milkshakes Pudding  Gatorade Sherbert Yogurt     REPRODUCTION:  Reproductive risks were discussed, including appropriate use of birth control, and protection during sexual relations. The risks of becoming pregnant while receiving chemotherapy and/or biotherapy were reviewed for females.  Males were instructed to use appropriate birth control to prevent conception during treatment.  We also discussed the importance of using reliable barrier methods while participating in intimate activities as this may expose their partners to a potentially harmful drug. The importance of pregnancy prevention was emphasized due to risks of increased chance of birth defects and miscarriages.     Tyree Orourke was provided handouts on:   1. Home instructions  2. Complete blood counts and terminology  3. Nutrition during cancer therapy   4. Fluids and dehydration  5. Mouth care  6. Cancer-related fatigue  7. Patient instructions for home pump  8. Infusion site reaction/extravasation  9. Oxaliplatin side effects and self-care tips  10. Handouts from [email or web address] on specific drugs   11. Handouts on Zofran and dexamethasone drug information   12. A signed copy of chemotherapy/biotherapy consent     TOPICS EDUCATION PROVIDED EDUCATION REINFORCED COMMENTS   ANEMIA:  role of RBC, cause, s/s, ways to manage, role of transfusion [x] [x]    THROMBOCYTOPENIA:  role of platelet, cause, s/s, ways to prevent bleeding, things to avoid, when to seek help [x] [x] Discussed use of electric razor and soft  toothbrush   NEUTROPENIA:  role of WBC, cause, infection precautions, s/s of infection, when to call MD [x] [x]    NUTRITION & APPETITE CHANGES:  importance of maintaining healthy diet & weight, ways to manage to improve intake, dietary consult, exercise regimen [x] [x] Educated patient and wife a dietician is available to speak with for nutrition concerns   DIARRHEA:  causes, s/s of dehydration, ways to manage, dietary changes, when to call MD [x] [x]    CONSTIPATION:  causes, ways to manage, dietary changes, when to call MD [x] []    NAUSEA & VOMITING:  cause, use of antiemetics, dietary changes, when to call MD [x] []    MOUTH SORES:  causes, oral care, ways to manage [x] [x]    ALOPECIA:  cause, ways to manage, resources [x] []    INFERTILITY & SEXUALITY:  causes, fertility preservation options, sexuality changes, ways to manage, importance of birth control [x] []    NERVOUS SYSTEM CHANGES:  causes, s/s, neuropathies, cognitive changes, ways to manage [x] []    PAIN:  causes, ways to manage [x] []    SKIN & NAIL CHANGES:  cause, s/s, ways to manage [x] []    ORGAN TOXICITIES:  cause, s/s, need for diagnostic tests, labs, when to notify MD [x] []    SURVIVORSHIP:  distress, distress assessment, secondary malignancies, early/late effects, follow-up, social issues, social support [x] []    HOME CARE:  use of spill kits, storing of PO chemo, how to manage bodily fluids [x] [x]    MISCELLANEOUS:  drug interactions, administration, vesicant, etc, [x] [x] · Home pump instructions reviewed and signed and handout given to patient  · Oxaliplatin side effects and self-care tips         PAST MEDICAL HISTORY:  Past Medical History:   Diagnosis Date   • Esophageal cancer (CMS/HCC)     esophageal    • GERD (gastroesophageal reflux disease)    • Hypertension    • Lupus (CMS/HCC)     skin       PAST SURGICAL HISTORY:  Past Surgical History:   Procedure Laterality Date   • ENDOSCOPY     • REPLACEMENT TOTAL KNEE Left 04/2019   •  UPPER ENDOSCOPIC ULTRASOUND W/ FNA N/A 6/9/2020    Procedure: Endoscopic ULTRASOUND;  Surgeon: Uriel Singleton MD;  Location: [facility] ENDOSCOPY;  Service: Gastroenterology;  Laterality: N/A;  Post operative diagnosis: mass, lymph nodes   • VENOUS ACCESS DEVICE (PORT) INSERTION Left 6/15/2020    Procedure: INSERTION VENOUS ACCESS DEVICE LEFT SUBCLAVIAN UNDER FLOUROSCOPIC GUIDANCE;  Surgeon: Tyree Adkins MD;  Location: [facility] MAIN OR;  Service: Cardiothoracic;  Laterality: Left;       CURRENT MEDICATIONS:    Current Outpatient Medications:   •  apixaban (ELIQUIS) 5 MG tablet tablet, Take 1 tablet by mouth Every 12 (Twelve) Hours. (Patient taking differently: Take 5 mg by mouth Daily.), Disp: 60 tablet, Rfl: 3  •  ferrous gluconate (FERGON) 324 MG tablet, Take 1 tablet by mouth 2 (two) times a day., Disp: 60 tablet, Rfl: 0  •  ferrous sulfate 325 (65 FE) MG tablet, Take 325 mg by mouth Daily., Disp: , Rfl:   •  lisinopril (PRINIVIL,ZESTRIL) 10 MG tablet, Take 10 mg by mouth Daily. Take preop, Disp: , Rfl:   •  omeprazole (priLOSEC) 40 MG capsule, Take 40 mg by mouth Daily. May take preop, Disp: , Rfl:     ALLERGIES:  No Known Allergies    FAMILY HISTORY:  Family History   Problem Relation Age of Onset   • Leukemia Mother         CLL       ONCOLOGIC FAMILY HISTORY:  Cancer-related family history is not on file.    SOCIAL HISTORY:  Social History     Tobacco Use   • Smoking status: Never Smoker   • Smokeless tobacco: Never Used   Substance Use Topics   • Alcohol use: Yes     Frequency: 2-4 times a month     Comment: Socially   • Drug use: Never       I have reviewed the history of present illness, past medical history, family history, and social history, since the patient was last seen on 6/26/2020.    REVIEW OF SYSTEMS:  Review of Systems   Constitutional: Negative for chills and fatigue.   Respiratory: Negative for chest tightness and shortness of breath.    Cardiovascular: Negative for chest pain and leg  swelling.   Gastrointestinal: Negative for nausea and vomiting.   Musculoskeletal: Negative.    Skin: Negative for color change.        Left chest wall port    Psychiatric/Behavioral: Negative for agitation and confusion.       PHYSICAL EXAMINATION:  Physical Exam   Constitutional: He is oriented to person, place, and time. He appears well-developed and well-nourished.   HENT:   Head: Normocephalic and atraumatic.   Eyes: Right eye exhibits no discharge. Left eye exhibits no discharge. No scleral icterus.   Neck: No tracheal deviation present. No thyromegaly present.   Pulmonary/Chest: Effort normal.   Neurological: He is alert and oriented to person, place, and time.   Skin:   Left chest wall port with steri strips-no edema noted       LABS:  WBC   Date Value Ref Range Status   06/15/2020 7.80 3.40 - 10.80 10*3/mm3 Final     RBC   Date Value Ref Range Status   06/15/2020 4.51 4.14 - 5.80 10*6/mm3 Final     Hemoglobin   Date Value Ref Range Status   06/15/2020 13.1 13.0 - 17.7 g/dL Final     Hematocrit   Date Value Ref Range Status   06/15/2020 38.0 37.5 - 51.0 % Final     MCV   Date Value Ref Range Status   06/15/2020 84.2 79.0 - 97.0 fL Final     MCH   Date Value Ref Range Status   06/15/2020 29.1 26.6 - 33.0 pg Final     MCHC   Date Value Ref Range Status   06/15/2020 34.6 31.5 - 35.7 g/dL Final     RDW   Date Value Ref Range Status   06/15/2020 13.5 12.3 - 15.4 % Final     RDW-SD   Date Value Ref Range Status   06/15/2020 40.3 37.0 - 54.0 fl Final     MPV   Date Value Ref Range Status   06/15/2020 8.2 6.0 - 12.0 fL Final     Platelets   Date Value Ref Range Status   06/15/2020 345 140 - 450 10*3/mm3 Final     Neutrophil %   Date Value Ref Range Status   05/28/2020 74.6 42.7 - 76.0 % Final     Lymphocyte %   Date Value Ref Range Status   05/28/2020 12.9 (L) 19.6 - 45.3 % Final     Monocyte %   Date Value Ref Range Status   05/28/2020 11.7 5.0 - 12.0 % Final     Eosinophil %   Date Value Ref Range Status    05/28/2020 0.6 0.3 - 6.2 % Final     Basophil %   Date Value Ref Range Status   05/28/2020 0.2 0.0 - 1.5 % Final     Neutrophils, Absolute   Date Value Ref Range Status   05/28/2020 7.18 (H) 1.70 - 7.00 10*3/mm3 Final     Lymphocytes, Absolute   Date Value Ref Range Status   05/28/2020 1.24 0.70 - 3.10 10*3/mm3 Final     Monocytes, Absolute   Date Value Ref Range Status   05/28/2020 1.13 (H) 0.10 - 0.90 10*3/mm3 Final     Eosinophils, Absolute   Date Value Ref Range Status   05/28/2020 0.06 0.00 - 0.40 10*3/mm3 Final     Basophils, Absolute   Date Value Ref Range Status   05/28/2020 0.02 0.00 - 0.20 10*3/mm3 Final     Lab Results   Component Value Date    GLUCOSE 132 (H) 06/15/2020    BUN  06/15/2020      Comment:      Testing performed by alternate method    BUN 11 06/15/2020    CREATININE 0.83 06/15/2020    EGFRIFNONA 95 06/15/2020    BCR  06/15/2020      Comment:      Testing not performed.    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 Encounter for medication management and education of chemotherapy/biotherapy    2. 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    PLAN  • CBC, CMP reviewed  • Start docetaxel, oxaliplatin, leucovorin, fluorouracil tomorrow 6/30/20  • Educated patient to  dexamethasone and Zofran from pharmacy  • Educated patient to start taking Dexamethasone as prescribed: 2 tablets oral twice a day for 3 consecutive days beginning today 6/29/20 and continue for 6 doses   • [place] paperwork faxed per MA, copy given to patient and wife  • Patient requested future labs be completed in [facility]-message sent to MA  • RTC 6/30/20 for infusion appointment  • Follow-up with  Dr. Peters scheduled for 7/16/20      I have reviewed labs results, imaging, vitals, and medications with the patient today.    A total of 50 minutes were spent with the patient, with 100% of time spent in education and counseling.    Electronically signed by NATHAN Castellon, 06/29/20, 5:05 PM.

## 2020-06-30 PROBLEM — Z45.2 ENCOUNTER FOR FITTING AND ADJUSTMENT OF VASCULAR CATHETER: Status: ACTIVE | Noted: 2020-01-01

## 2020-06-30 NOTE — PROGRESS NOTES
Pt given chemo spill kit and instructions about how to use and when to call taught by PEEWEE Morrison RN

## 2020-07-01 NOTE — PROGRESS NOTES
"                 Nutrition Assessment  Tyree Orourke    Anthropometrics  Height: 5'10\"  Weight: 176.3#  BMI: 25.3  Weight Hx:   (5/18/20) 197#  (5/26/20) 188.4#  (6/9/20) 180.9#  (6/15/20) 180.7#  (6/30/20) 176.3#  IBW: 166# (106.2%)  UBW: 210# (83.9%)    Nutrition Questionnaire    Food Allergies: Pt denied allergies at this time    Chewing/Swallowing Problems: Pt denies problems chewing, but states he only has bottom teeth, no top teeth, and doesn't wear dentures. Pt only has trouble swallowing foods that are tough and chewy such as meat and bread, he avoids both.    Who does the cooking & shopping: Pt and his wife do the shopping, the pt's wife does the cooking.    Nausea/Vomiting/Diarrhea: Pt denies n/v/d/c at this time, but states that he would feel nauseous if he chose to eat foods that can get stuck (meat and bread).    Appetite: good    24-Hour Diet Recall:   Breakfast - brown sugar and cinnamon oatmeal, sweet tea  Snack during chemo - moon pie, chocolate pudding, sweet tea  Late lunch - large helping of macaroni and beef hamburger helper, sweet tea  Dinner - large helping of macaroni and beef hamburger helper, sweet tea  Snack - whole milk with 3-4 handfuls of vanilla wafers (He dipped the cookies in milk to make them soft and easy to swallow.)    Discussion: I met the pt today to discuss his diet and weight loss. Pt said he was consistently losing weight bc he was struggling to figure out what to eat since several foods struggled to go down. Pt also had to limit his intake significantly to only soft foods. Pt said he's eating better now because he's used to relying on softer foods that are easy to swallow. Pt said he understands that if he continues to lose weight, we will have to consider using a feeding tube.     Pt has been avoiding meat, but has not tried cooking it in a slow cooker or pressure cooker. Pt liked this idea and seemed excited to give it a try since it will hopefully be tender enough for " him to swallow comfortably and easily. I recommended he not drink a lot with his meals to prevent a false feeling of fullness to assure he consumes adequate calories. I suggested he sip on water and other fluids throughout the day to assure he stays hydrated, pt verbalized understanding.    Pt likes like current weight and would like to maintain it. Pt said he knew he needed to drop a few pounds, but didn't want to lose it the way he did.     We discussed the importance of consuming adequate calories and protein throughout treatment for optimal nutrition and to prevent further weight and muscle loss. We discussed possible side effects of treatment and ways to manage them with diet. I encouraged the pt to make snack boxes and distribute them around his home for convenient snacking during times he feels too weak and tired to get up and go to the kitchen or to cook. We discussed foods and beverages to keep in the snack boxes. Pt verbalized understanding.    Pt was kind and had an optimistic attitude. He appreciated today's visit. I will continue to follow as needed.    Karlie Langford, MS,RD,LD-[place],CD-[place]  Registered Dietitian

## 2020-07-01 NOTE — PROGRESS NOTES
Patient here for [facility]. Pump empty, blood return confirmed. Patient states he has no changes or issues at this time.

## 2020-07-07 NOTE — PROGRESS NOTES
Thoracic Surgery Progress Note      Chief Complaint:   Status post Mediport placement    Interval History:  Patient underwent left subclavian Mediport placement.  He tolerated it well.  The wound is healing up nicely the suture was removed.  I have reviewed the interim physician medical record   I have discussed the case with the following physicians   I have reviewed the interim radiographic images and the radiology reports, my independent findings are as follows     Review of Systems:      Physical Exam:      Results Review:      Impression/Plan:  Plan follow-up as needed.  Undergoing chemotherapy  Procedures    Tyree Adkins MD  7/7/2020  09:13        Answers for HPI/ROS submitted by the patient on 7/6/2020   What is the primary reason for your visit?: Other  Please describe your symptoms.: Remove stitches  Have you had these symptoms before?: No  How long have you been having these symptoms?: 1-2 weeks

## 2020-07-13 NOTE — TELEPHONE ENCOUNTER
Pt's wife called again today and was upset that no one called her back regarding her question. They were called regarding a biopsy on Mr. Orourke from radiology and they were not told anything about why he was getting this done. The only info they received was that there would be a lap procedure with Dr. Suarez in the future. They order is in the chart but clearly says to call Dr. Peters before scheduling. I told pt I will f/u today with information regarding this.

## 2020-07-14 NOTE — PATIENT INSTRUCTIONS
Neutropenia  Neutropenia is a condition that occurs when you have a lower-than-normal level of a type of white blood cell (neutrophil) in your body. Neutrophils are made in the spongy center of large bones (bone marrow), and they fight infections.  Neutrophils are your body's main defense against bacterial and fungal infections. The fewer neutrophils you have and the longer your body remains without them, the greater your risk of getting a severe infection.  What are the causes?  This condition can occur if your body uses up or destroys neutrophils faster than your bone marrow can make them. Neutropenia may be caused by:  · A bacterial or fungal infection.  · Allergic disorders.  · Reactions to some medicines.  · An autoimmune disease.  · An enlarged spleen.  This condition can also occur if your bone marrow does not produce enough neutrophils. This problem may be caused by:  · Cancer.  · Cancer treatments, such as radiation or chemotherapy.  · Viral infections.  · Medicines, such as phenytoin.  · Vitamin B12 deficiency.  · Diseases of the bone marrow.  · Environmental toxins, such as insecticides.  What are the signs or symptoms?  This condition does not usually cause symptoms. If symptoms are present, they are usually caused by an underlying infection. Symptoms of an infection may include:  · Fever.  · Chills.  · Swollen glands.  · Oral or anal ulcers.  · Cough and shortness of breath.  · Rash.  · Skin infection.  · Fatigue.  How is this diagnosed?  Your health care provider may suspect neutropenia if you have:  · A condition that may cause neutropenia.  · Symptoms during or after treatment for cancer.  · Symptoms of infection, especially fever.  · Frequent and unusual infections.  This condition is diagnosed based on your medical history and a physical exam. Tests will also be done, such as:  · A complete blood count (CBC).  · A procedure to collect a sample of bone marrow for examination (bone marrow  biopsy).  · A chest X-ray.  · A urine culture.  · A blood culture.  How is this treated?  Treatment depends on the underlying cause and severity of your condition. Mild neutropenia may not require treatment. Treatment may include medicines, such as:  · Antibiotic medicine given through an IV.  · Antiviral medicines.  · Antifungal medicines.  · A medicine to increase neutrophil production (colony-stimulating factor). You may get this drug through an IV or by injection.  · Steroids given through an IV.  If an underlying condition is causing neutropenia, you may need treatment for that condition. If medicines or cancer treatments are causing neutropenia, your health care provider may have you stop the medicines or treatment.  Follow these instructions at home:  Medicines    · Take over-the-counter and prescription medicines only as told by your health care provider.  · Get a seasonal flu shot (influenza vaccine).  · Avoid people who received a vaccine in the past 30 days if that vaccine contained a live version of the germ (live vaccine). You should not get a live vaccine. Common live vaccines are polio, MMR, chicken pox, and shingles vaccines.  Eating and drinking  · Do not share food utensils.  · Do not eat unpasteurized foods.  · Do not eat raw or undercooked meat, eggs, or seafood.  · Do not eat unwashed, raw fruits or vegetables.  Lifestyle  · Avoid exposure to groups of people or children.  · Avoid being around people who are sick.  · Avoid being around dirt or dust, such as in construction areas or gardens.  · Do not provide direct care for pets. Avoid animal droppings. Do not clean litter boxes and bird cages.  · Do not have sex unless your health care provider has approved.  Hygiene    · Bathe daily.  · Clean the area between the genitals and the anus (perineal area) after you urinate or have a bowel movement. If you are female, wipe from front to back.  · Brush your teeth with a soft toothbrush before and  after meals.  · Do not use a regular razor. Use an electric razor to remove hair.  · Wash your hands often. Make sure others who come in contact with you also wash their hands. If soap and water are not available, use hand .  General instructions  · Follow any precautions as told by your health care provider to reduce your risk for injury or infection.  · Take actions to avoid cuts and burns. For example:  ? Be cautious when you use knives. Always cut away from yourself.  ? Keep knives in protective sheaths or guards when not in use.  ? Use oven mitts when you cook with a hot stove, oven, or grill.  ? Stand a safe distance away from open fires.  · Do not use tampons, enemas, or rectal suppositories unless your health care provider has approved.  · Keep all follow-up visits as told by your health care provider. This is important.  Contact a health care provider if:  · You have:  ? A sore throat.  ? A warm, red, or tender area on your skin.  ? A cough.  ? Frequent or painful urination.  ? Vaginal discharge or itching.  · You develop:  ? Sores in your mouth or anus.  ? Swollen lymph nodes.  ? Red streaks on the skin.  ? A rash.  Get help right away if:  · You have:  ? A fever.  ? Chills, or you start to shake.  · You feel:  ? Nauseous, or you vomit.  ? Very fatigued.  ? Short of breath.  Summary  · Neutropenia is a condition that occurs when you have a lower-than-normal level of a type of white blood cell (neutrophil) in your body.  · This condition can occur if your body uses up or destroys neutrophils faster than your bone marrow can make them.  · Treatment depends on the underlying cause and severity of your condition. Mild neutropenia may not require treatment.  · Follow any precautions as told by your health care provider to reduce your risk for injury or infection.  This information is not intended to replace advice given to you by your health care provider. Make sure you discuss any questions you have  with your health care provider.  Document Released: 06/09/2003 Document Revised: 10/03/2019 Document Reviewed: 10/03/2019  Elsevier Patient Education © 2020 [employer]

## 2020-07-14 NOTE — ADDENDUM NOTE
Encounter addended by: Tamiko Alcocer RN on: 7/14/2020 7:55 PM   Actions taken: Order Reconciliation Section accessed, Flowsheet accepted

## 2020-07-14 NOTE — PROGRESS NOTES
Patient here for C2D1 FLOT. His ANC is 1.35 today. I discussed this with Fatoumata Hampton NP and she said it was ok to treat. I gave patient information on neutropenia and told him when to call us with symptoms. Patient verbalized understanding.

## 2020-07-14 NOTE — PROGRESS NOTES
HEMATOLOGY ONCOLOGY OUTPATIENT FOLLOW UP       Patient name: Tyree Orourke  : 1960  MRN: [id number]  Primary Care Physician: Glenys Brewster MD  Referring Physician: Glenys Brewster MD  Reason For Consult:     Chief Complaint   Patient presents with   • Follow-up     Malignant neoplasm of esophagus, unspecified location   Poorly differentiated GE junction adenocarcinoma.      HPI:   History of Present Illness:  Tyree Orourke is 60 y.o. 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  He reports symptoms of GERD  many years ago, took Tums regularly then went away; about 15 years remotely.  He unfortunately developed GI symptoms about 2 months ago.  His weight down 20 lbs in 2 months. He denied any pain or heartburn.  Over the last two months, he does report SOB easily with exertion.  He saw his PCP Dr. Brewster who ordered work-up. He has had the following imaging and work-up/biospy:.  Patient is not a smoker and quit about 20 years ago.  He works as a supervisor at [employer].    · 2020: Creatinine 0.85, ALT 15, AST 21 alk phos 100, bilirubin 0.6, albumin 3.7, calcium 9.0,  · 2020: Patient underwent upper GI endoscopy: Surgical pathology of gastric fundus biopsy shows poorly differentiated carcinoma with signet ring cell features involving cardio oxyntic mucosa.  Gastric antrum biopsy shows chronic gastritis.  Negative for H. Pylori.  · 2020: CT chest and abdomen with contrast: There is ill-defined low-density lesion posteriorly in the right lobe of the liver which could be a metastatic lesion, measuring 2 cm,.  There is a significant thickening throughout the GE junction region extending to the level of the proximal stomach, suspicious for tumor.  Soft tissue planes are ill-defined at the inferior aspect of this area due to tumor  infiltration.  There is moderate adenopathy in the retroperitoneum and periceliac region.  Evidence of PE.    · 5/14/2020: CT angiogram of the chest with contrast: Bilateral segmental pulmonary emboli without definitive evidence of right heart strain.  Small nodule along the left major fissure consistent with a intrapulmonary lymph node.  Redemonstration of lower esophageal/gastric mass with surrounding lymphadenopathy and a hypoattenuating lesion in the right hepatic lobe.      · 5/18/2020: The patient is here for his Initial Consultation  appointment with Dr. Peters from Dr. Pettit. He has had symptoms for about 2 months. The patient denies a lot of pain. The patient has lost at least 20lbs since the symptoms started. He denies any heartburn and has been taking Zantac BID to help prevent developing it. He will d/c that and start taking Pepcid. He says that he gets SOB easily with exertion. The patient denies any past medical problems except Lupus and high blood pressure. The patient stated he is supposed to fly out on a trip to [place] [employer] tomorrow until Saturday for a golf trip. The patient is being given Eliquis samples 10mg BID for one week and then 5mg BID after that.   · 5/18/2020: Patient started Eliquis.    · 5/26/2020: PET CT scan: There is metabolic activity around the area of the GE junction with thickening of the wall of the bowel in this area.  Maximum SUV 4.6.  No areas of abnormal metabolic activity are seen within the abdomen or pelvis.  There are gallstones within the gallbladder without inflammation.  · 5/28/2020 CEA 13.3, d-dimer 3.28 high, ferritin 104.7, iron 31 low, iron saturation 9% low, TIBC 359,  · 5/29/2020: MRI abdomen with and without contrast: Hemangioma within the right lobe of the liver measuring 2.1 cm.  Stable appearance of multiple retroperitoneal lymph nodes, some of which are considered pathological based on size.  They would be suspicious for metastasis.  Nodular thickening and  enhancement of the distal esophagus and fundus of the stomach.    · 6/9/2020: Endoscopic ultrasound by Dr. Singleton.:A large GE junction mass starting at 36 cm and traversing into the stomach/cardia.  The majority of this has broken into or through the muscularis propria and there is a location where there is a loss of plane between the liver and the mass suggestive of possible invasion of the hepatic capsule making this a T4 lesion.  At least 4 lymph nodes in the celiac axis were visualized with the largest measuring 1.7 cm.  They are round and hypoechoic suggestive of underlying malignancy.  These cannot be biopsied as they were directly behind the mass.   Normal pancreatic uncinate head, neck body and tail.  Normal ampulla and common bile duct.  · 6/15/2020: WBC 7.8, hemoglobin 13.1, platelets 345, MCV 84.2, creatinine 0.83,  · 6/30/2020: Patient received cycle 1 of FLOT, WBC 6.1, hemoglobin 12.8, platelets 286, creatinine 0.86, LFTs normal, CA-19-9 628.5 high, CEA 15.1,  · 7/7/2020: WBC 3.88, hemoglobin 13, platelets 229  · 7/14/2020: Patient received cycle 2 of chemotherapy    Subjective:   Patient presents for follow-up regarding GI cancer and pump DC. He has completed two cycles with the most recent being 07/14/20. He states he feels well and is not experiencing many side effects. He has not had any official consultation with Dr. Shook yet.   He states his appetite is getting better. He takes Zofran before every meal and that seems to help a lot. He states that he is learning what he can do to combat nausea. He states he still has some dysphagia. He saw our nutritionist today and was given ideas.   He has gained about 5 pounds since her last visit.      The following portions of the patient's history were reviewed and updated as appropriate: allergies, current medications, past family history, past medical history, past social history, past surgical history and problem list.    Past Medical History:    Diagnosis Date   • Esophageal cancer (CMS/HCC)     esophageal    • GERD (gastroesophageal reflux disease)    • Hypertension    • Lupus (CMS/HCC)     skin       Past Surgical History:   Procedure Laterality Date   • ENDOSCOPY     • REPLACEMENT TOTAL KNEE Left 04/2019   • UPPER ENDOSCOPIC ULTRASOUND W/ FNA N/A 6/9/2020    Procedure: Endoscopic ULTRASOUND;  Surgeon: Uriel Singleton MD;  Location: [facility] END[facility]COPY;  Service: Gastroenterology;  Laterality: N/A;  Post operative diagnosis: mass, lymph nodes   • VENOUS ACCESS DEVICE (PORT) INSERTION Left 6/15/2020    Procedure: INSERTION VENOUS ACCESS DEVICE LEFT SUBCLAVIAN UNDER FLOUROSCOPIC GUIDANCE;  Surgeon: Tyree Adkins MD;  Location: [facility] MAIN OR;  Service: Cardiothoracic;  Laterality: Left;         Current Outpatient Medications:   •  apixaban (ELIQUIS) 5 MG tablet tablet, Take 1 tablet by mouth Every 12 (Twelve) Hours. (Patient taking differently: Take 5 mg by mouth Daily.), Disp: 60 tablet, Rfl: 3  •  dexamethasone (DECADRON) 4 MG tablet, Take 2 tablets oral twice a day for 3 consecutive days beginning the day before chemotherapy and continue for 6 doses., Disp: 12 tablet, Rfl: 7  •  ferrous gluconate (FERGON) 324 MG tablet, Take 1 tablet by mouth 2 (two) times a day., Disp: 60 tablet, Rfl: 0  •  ferrous sulfate 325 (65 FE) MG tablet, Take 325 mg by mouth Daily., Disp: , Rfl:   •  lisinopril (PRINIVIL,ZESTRIL) 10 MG tablet, Take 10 mg by mouth Daily. Take preop, Disp: , Rfl:   •  omeprazole (priLOSEC) 40 MG capsule, Take 40 mg by mouth Daily. May take preop, Disp: , Rfl:   •  ondansetron (ZOFRAN) 8 MG tablet, Take 1 tablet by mouth 3 (Three) Times a Day As Needed for Nausea or Vomiting., Disp: 30 tablet, Rfl: 5  No current facility-administered medications for this visit.     No Known Allergies    Family History   Problem Relation Age of Onset   • Leukemia Mother         CLL     Cancer-related family history is not on file.    Social History     Tobacco  "Use   • Smoking status: Never Smoker   • Smokeless tobacco: Never Used   Substance Use Topics   • Alcohol use: Yes     Frequency: 2-4 times a month     Comment: Socially   • Drug use: Never     Social History     Social History Narrative    Patient lives in [place], [place], with his wife. He has one child. He does not smoke. He works at [employer].      ROS:     Review of Systems   Constitutional: Positive for unexpected weight change. Negative for activity change, chills and fever.   HENT: Negative for drooling, ear discharge, mouth sores, nosebleeds and sore throat.    Eyes: Negative for photophobia, pain, redness and visual disturbance.   Respiratory: Negative for cough, choking, shortness of breath and wheezing.    Cardiovascular: Negative for chest pain and palpitations.   Gastrointestinal: Negative for abdominal pain, diarrhea, nausea and vomiting.        Dysphagia   Genitourinary: Negative for dysuria.   Musculoskeletal: Negative for neck stiffness.   Skin: Negative for rash.   Neurological: Negative for seizures, syncope and speech difficulty.   Psychiatric/Behavioral: Negative for agitation, confusion and hallucinations.     I have reviewed and confirmed the accuracy of the patient's history which includes chief complaint, subjective, HPI, ROS,as entered by the MA/LPN/RN.    Objective:    Vitals:    07/15/20 1527   BP: 132/81   Pulse: 71   Resp: 16   Temp: 98 °F (36.7 °C)   Weight: 78.9 kg (174 lb)   Height: 177.8 cm (70\")   PainSc: 0-No pain     Body mass index is 24.97 kg/m².  ECOG  (0) Fully active, able to carry on all predisease performance without restriction    Physical Exam:     Physical Exam   Constitutional: He is oriented to person, place, and time. He appears well-developed and well-nourished. No distress.   HENT:   Head: Normocephalic and atraumatic.   Eyes: Conjunctivae and EOM are normal. Right eye exhibits no discharge. Left eye exhibits no discharge. No scleral icterus.   Neck: Normal range of " motion. Neck supple. No thyromegaly present.   Cardiovascular: Normal rate, regular rhythm and normal heart sounds. Exam reveals no gallop and no friction rub.   Pulmonary/Chest: Effort normal. No stridor. No respiratory distress. He has no wheezes.   Abdominal: Soft. Bowel sounds are normal. He exhibits no mass. There is no tenderness. There is no rebound and no guarding.   Musculoskeletal: Normal range of motion. He exhibits no tenderness.   Lymphadenopathy:     He has no cervical adenopathy.   Neurological: He is alert and oriented to person, place, and time. He exhibits normal muscle tone.   Skin: Skin is warm. No rash noted. He is not diaphoretic. No erythema.   Psychiatric: He has a normal mood and affect. His behavior is normal.   Nursing note and vitals reviewed.            Lab Results - Last 18 Months   Lab Units 07/14/20  0829 07/07/20  0942 06/30/20  0742   WBC 10*3/mm3 2.67* 3.88 6.18   HEMOGLOBIN g/dL 11.6* 13.0 12.8*   HEMATOCRIT % 34.5* 38.9 37.8   PLATELETS 10*3/mm3 214 229 286   MCV fL 84.6 85.3 85.3     Lab Results - Last 18 Months   Lab Units 07/14/20  0829 06/30/20  0742 06/15/20  0900 05/28/20  1332   SODIUM mmol/L 139 138 137 135*   POTASSIUM mmol/L 4.3 4.3 3.9 4.2   CHLORIDE mmol/L 104 102 104 99   CO2 mmol/L 22.0 22.0 21.0* 22.0   BUN  14 12 11 20   CREATININE mg/dL 0.82 0.86 0.83 0.98   CALCIUM mg/dL 9.4 9.8 9.6 9.7   BILIRUBIN mg/dL 0.3 0.3  --  0.3   ALK PHOS U/L 82 92  --  92   ALT (SGPT) U/L 15 10  --  16   AST (SGOT) U/L 16 15  --  19   GLUCOSE mg/dL 141* 230* 132* 117*       Lab Results   Component Value Date    GLUCOSE 141 (H) 07/14/2020    BUN  07/14/2020      Comment:      Testing performed by alternate method    BUN 14 07/14/2020    CREATININE 0.82 07/14/2020    EGFRIFNONA 96 07/14/2020    BCR  07/14/2020      Comment:      Testing not performed    K 4.3 07/14/2020    CO2 22.0 07/14/2020    CALCIUM 9.4 07/14/2020    ALBUMIN 3.70 07/14/2020    AST 16 07/14/2020    ALT 15 07/14/2020        No results for input(s): APTT, INR, PTT in the last 70488 hours.    Lab Results   Component Value Date    IRON 31 (L) 05/28/2020    TIBC 359 05/28/2020    FERRITIN 104.70 05/28/2020       No results found for: FOLATE    No results found for: OCCULTBLD    No results found for: RETICCTPCT    No results found for: IMSLBNVU61  No results found for: SPEP, UPEP  No results found for: LDH, URICACID  No results found for: ALFREDA, RF, SEDRATE  No results found for: FIBRINOGEN, HAPTOGLOBIN  No results found for: PTT, INR  No results found for:   Lab Results   Component Value Date    CEA 15.10 06/30/2020     No components found for: CA-19-9  No results found for: PSA          Assessment/Plan     Assessment:  1. 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  He does have retroperitoneal lymph node involvement as well as celiac lymph node metastases.  Mass causing liver capsule invasion.  Patient presented with dysphagia and has 30 pound weight loss.  2. Pulmonary embolism: Started on Eliquis .  Thrombosis very likely secondary to malignancy.   3. Anemia/leukopenia: Due to myelosuppression from chemotherapy  4. Iron deficiency  5. Weight loss: G-tube may be considered if he continues to lose weight.          Plan:  1. Continue with chemotherapy with FLOT.  He is tolerating the treatment very well..  Explained that his disease is stage IV but we are trying to  downstage him with the chemotherapy and see if his disease can get borderline resectable.  2. Surgical consultation pending with Dr. Shook.  Understands the disease is stage IV due to retroperitoneal lymph node involvement.  Will send patient's imaging discs.  Will discuss if chemotherapy would be able to downstage disease to consider surgical options.  To consider laparoscopic staging.  3. Monitor closely for chemotherapy related adverse events.  4. Continue iron  supplementation.  5. Continue Eliquis 5 twice daily.    6. We will redraw CEA and CA-19-9  7. We will follow-up in 4 weeks                   I have reviewed and confirmed the accuracy of the patient's history: Chief complaint, HPI, ROS and Subjective as entered by the MA/LPN/RN. Jackson Peters MD 07/15/20     I have reviewed all relevant outside reports, notes, labs results, imaging, vitals, medications and plan with the patient today.    Portions of the note have been Scribed by medical assistant/Nurse.  I have reviewed and made changes accordingly.      Electronically signed by Jackson Peters MD, 05/18/20, 12:06 PM.

## 2020-07-15 NOTE — PROGRESS NOTES
"                 Nutrition Assessment  Tyree Orourke    Anthropometrics  Height: 5'10\"  Weight: 173.9#  BMI: 25  Weight Hx:   (5/18/20) 197#  (5/26/20) 188.4#  (6/9/20) 180.9#  (6/15/20) 180.7#  (6/30/20) 176.3#  (7/1/20) 176.3#  (7/7/20) 171#  (7/14/20) 171.9#  IBW: 166# (104.7%)  UBW: 210# (82.8%)    Nutrition Questionnaire    Nausea/Vomiting/Diarrhea: Pt said he d/c one day a few days ago after starting his iron supplement.    Appetite: good    24-Hour Diet Recall:   Breakfast - oatmeal  Snacks during chemo - large chocolate pudding, fig bar, ~20 cheese cubes, 1 sleeve of Rits crackers, sweet tea  Dinner - [employer]: 3 cheeseburgers (didn't settle well)  Evening Snack - 5-6 large chocolate chip cookies, grape-flavored water    Discussion: I met with pt before his MD appointment. We discussed the importance of continuing to eat as much of what he's able to tolerate to maintain his weight and prevent further weight loss, pt verbalized understanding. Pt said he's tried crock pot chicken since the last time we spoke and he said he was able to eat it comfortably, pt was happy about this. Pt credits a large part of his weight loss to no longer drinking coke. Pt said the carbonation really bothers him now. Pt said he used to have two large big gulps/day and one liter at night. I recommended he incorporate Very High Calorie Boost VHCH into his diet, at least 1-2/day to prevent further weight loss. I provided materials on how to purchase, pt said he will. We discussed ways to prepare it and change the flavor if need be.     Pt was kind and had an optimistic attitude. He appreciated today's visit. I will continue to follow as needed.    Karlie Langford, MS,RD,LD-[place],CD-[place]  Registered Dietitian            "

## 2020-07-31 NOTE — PROGRESS NOTES
HEMATOLOGY ONCOLOGY OUTPATIENT FOLLOW UP       Patient name: Tyree Orourke  : 1960  MRN: [id number]  Primary Care Physician: Glenys Brewster MD  Referring Physician: Glenys Brewster MD  Reason For Consult:     No chief complaint on file.  Poorly differentiated signet ring gastric carcinoma.       HPI:   History of Present Illness:  Tyree Orourke is 60 y.o. 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  He reports symptoms of GERD  many years ago, took Tums regularly then went away; about 15 years remotely.  He unfortunately developed GI symptoms about 2 months ago.  His weight down 20 lbs in 2 months. He denied any pain or heartburn.  Over the last two months, he does report SOB easily with exertion.  He saw his PCP Dr. Brewster who ordered work-up. He has had the following imaging and work-up/biospy:.  Patient is not a smoker and quit about 20 years ago.  He works as a supervisor at [employer].    · 2020: Creatinine 0.85, ALT 15, AST 21 alk phos 100, bilirubin 0.6, albumin 3.7, calcium 9.0,  · 2020: Patient underwent upper GI endoscopy: Surgical pathology of gastric fundus biopsy shows poorly differentiated carcinoma with signet ring cell features involving cardio oxyntic mucosa.  Gastric antrum biopsy shows chronic gastritis.  Negative for H. Pylori.  · 2020: CT chest and abdomen with contrast: There is ill-defined low-density lesion posteriorly in the right lobe of the liver which could be a metastatic lesion, measuring 2 cm,.  There is a significant thickening throughout the GE junction region extending to the level of the proximal stomach, suspicious for tumor.  Soft tissue planes are ill-defined at the inferior aspect of this area due to tumor infiltration.  There is moderate adenopathy in the retroperitoneum and periceliac region.   Evidence of PE.    · 5/14/2020: CT angiogram of the chest with contrast: Bilateral segmental pulmonary emboli without definitive evidence of right heart strain.  Small nodule along the left major fissure consistent with a intrapulmonary lymph node.  Redemonstration of lower esophageal/gastric mass with surrounding lymphadenopathy and a hypoattenuating lesion in the right hepatic lobe.      · 5/18/2020: The patient is here for his Initial Consultation  appointment with Dr. Peters from Dr. Pettit. He has had symptoms for about 2 months. The patient denies a lot of pain. The patient has lost at least 20lbs since the symptoms started. He denies any heartburn and has been taking Zantac BID to help prevent developing it. He will d/c that and start taking Pepcid. He says that he gets SOB easily with exertion. The patient denies any past medical problems except Lupus and high blood pressure. The patient stated he is supposed to fly out on a trip to [place] tomorrow until Saturday for a golf trip. The patient is being given Eliquis samples 10mg BID for one week and then 5mg BID after that.   · 5/18/2020: Patient started Eliquis.    · 5/26/2020: PET CT scan: There is metabolic activity around the area of the GE junction with thickening of the wall of the bowel in this area.  Maximum SUV 4.6.  No areas of abnormal metabolic activity are seen within the abdomen or pelvis.  There are gallstones within the gallbladder without inflammation.  · 5/28/2020 CEA 13.3, d-dimer 3.28 high, ferritin 104.7, iron 31 low, iron saturation 9% low, TIBC 359,  · 5/29/2020: MRI abdomen with and without contrast: Hemangioma within the right lobe of the liver measuring 2.1 cm.  Stable appearance of multiple retroperitoneal lymph nodes, some of which are considered pathological based on size.  They would be suspicious for metastasis.  Nodular thickening and enhancement of the distal esophagus and fundus of the stomach.    · 6/9/2020: Endoscopic  ultrasound by Dr. Singleton.:A large GE junction mass starting at 36 cm and traversing into the stomach/cardia.  The majority of this has broken into or through the muscularis propria and there is a location where there is a loss of plane between the liver and the mass suggestive of possible invasion of the hepatic capsule making this a T4 lesion.  At least 4 lymph nodes in the celiac axis were visualized with the largest measuring 1.7 cm.  They are round and hypoechoic suggestive of underlying malignancy.  These cannot be biopsied as they were directly behind the mass.   Normal pancreatic uncinate head, neck body and tail.  Normal ampulla and common bile duct.  · 6/15/2020: WBC 7.8, hemoglobin 13.1, platelets 345, MCV 84.2, creatinine 0.83,  · 6/30/2020: Patient received cycle 1 of FLOT, WBC 6.1, hemoglobin 12.8, platelets 286, creatinine 0.86, LFTs normal, CA-19-9 628.5 high, CEA 15.1,  · 7/7/2020: WBC 3.88, hemoglobin 13, platelets 229  · 7/14/2020: Patient received cycle 2 of FLOT  · 7/27/2020 WBC 2.44, hemoglobin 12.5, platelets 191, creatinine 0.7  · 7/30/2020: Patient received a cycle 3 of FLOT.  WBC 2.7, hemoglobin 11.8, platelets 198, creatinine 0.78    Subjective:   Patient presents for follow-up regarding GI cancer and pump DC. He has completed three cycles with the most recent being 07/30/20. He is scheduled for a surgery with Dr. Solo in the next couple of weeks around August 13... He will be on a Lovenox bridge prior to procedure.   He states his appetite is getting better. He takes Zofran before every meal and that seems to help. In the past couple of days he mentions more problems with vomiting after eating.   He is not diabetic. He mentions increasing sugar levels in the past few weeks.  His weight has stabilized.    The following portions of the patient's history were reviewed and updated as appropriate: allergies, current medications, past family history, past medical history, past social history,  past surgical history and problem list.    Past Medical History:   Diagnosis Date   • Esophageal cancer (CMS/HCC)     esophageal    • GERD (gastroesophageal reflux disease)    • Hypertension    • Lupus (CMS/HCC)     skin     Past Surgical History:   Procedure Laterality Date   • ENDOSCOPY     • REPLACEMENT TOTAL KNEE Left 04/2019   • UPPER ENDOSCOPIC ULTRASOUND W/ FNA N/A 6/9/2020    Procedure: Endoscopic ULTRASOUND;  Surgeon: Uriel Singleton MD;  Location: [facility] ENDOSCOPY;  Service: Gastroenterology;  Laterality: N/A;  Post operative diagnosis: mass, lymph nodes   • VENOUS ACCESS DEVICE (PORT) INSERTION Left 6/15/2020    Procedure: INSERTION VENOUS ACCESS DEVICE LEFT SUBCLAVIAN UNDER FLOUROSCOPIC GUIDANCE;  Surgeon: Tyree Adkins MD;  Location: [facility] MAIN OR;  Service: Cardiothoracic;  Laterality: Left;       Current Outpatient Medications:   •  apixaban (ELIQUIS) 5 MG tablet tablet, Take 1 tablet by mouth Every 12 (Twelve) Hours. (Patient taking differently: Take 5 mg by mouth Daily.), Disp: 60 tablet, Rfl: 3  •  dexamethasone (DECADRON) 4 MG tablet, Take 2 tablets oral twice a day for 3 consecutive days beginning the day before chemotherapy and continue for 6 doses., Disp: 12 tablet, Rfl: 7  •  famotidine (PEPCID) 20 MG tablet, Take 40 mg by mouth Daily., Disp: , Rfl:   •  ferrous gluconate (FERGON) 324 MG tablet, Take 1 tablet by mouth 2 (two) times a day., Disp: 60 tablet, Rfl: 0  •  lisinopril (PRINIVIL,ZESTRIL) 10 MG tablet, Take 10 mg by mouth Daily. Take preop, Disp: , Rfl:   •  Multiple Vitamins-Minerals (MULTIVITAMIN ADULT PO), Take  by mouth., Disp: , Rfl:   •  ondansetron (ZOFRAN) 8 MG tablet, Take 1 tablet by mouth 3 (Three) Times a Day As Needed for Nausea or Vomiting., Disp: 30 tablet, Rfl: 5  •  enoxaparin (LOVENOX) 120 MG/0.8ML solution syringe, Inject 0.8 mL under the skin into the appropriate area as directed Daily for 7 days., Disp: 5.6 mL, Rfl: 0  •  ferrous sulfate 325 (65 FE) MG  tablet, Take 325 mg by mouth Daily., Disp: , Rfl:   •  ondansetron (Zofran) 8 MG tablet, Take 1 tablet by mouth 3 (Three) Times a Day., Disp: 90 tablet, Rfl: 3  No current facility-administered medications for this visit.     Facility-Administered Medications Ordered in Other Visits:   •  heparin injection 500 Units, 500 Units, Intravenous, Lorraine GENAO Ajay, MD, 500 Units at 07/31/20 1238  •  sodium chloride 0.9 % flush 20 mL, 20 mL, Intravenous, PRNLorraine Ajay, MD, 10 mL at 07/31/20 1238    No Known Allergies    Family History   Problem Relation Age of Onset   • Leukemia Mother         CLL     Cancer-related family history is not on file.    Social History     Tobacco Use   • Smoking status: Never Smoker   • Smokeless tobacco: Never Used   Substance Use Topics   • Alcohol use: Yes     Frequency: 2-4 times a month     Comment: Socially   • Drug use: Never     Social History     Social History Narrative    Patient lives in [place], [place], with his wife. He has one child. He does not smoke. He works at [employer].      ROS:     Review of Systems   Constitutional: Negative for activity change, chills and fever.   HENT: Negative for drooling, ear discharge, mouth sores, nosebleeds and sore throat.    Eyes: Negative for photophobia, pain, redness and visual disturbance.   Respiratory: Negative for cough, choking, shortness of breath and wheezing.    Cardiovascular: Negative for chest pain and palpitations.   Gastrointestinal: Negative for abdominal pain, diarrhea, nausea and vomiting.        Slight dysphagia/regurgitation   Genitourinary: Negative for dysuria and urgency.   Musculoskeletal: Negative for neck stiffness.   Skin: Negative for rash.   Neurological: Negative for seizures, syncope and speech difficulty.   Psychiatric/Behavioral: Negative for agitation, confusion and hallucinations.     I have reviewed and confirmed the accuracy of the patient's history which includes chief complaint, subjective, HPI, ROS,as  "entered by the MA/LPN/RN.    Objective:    Vitals:    07/31/20 1142   BP: 124/80   Pulse: 71   Resp: 16   Temp: 97.8 °F (36.6 °C)   Weight: 77.1 kg (170 lb)   Height: 177.8 cm (70\")   PainSc: 0-No pain     Body mass index is 24.39 kg/m².  ECOG  (0) Fully active, able to carry on all predisease performance without restriction    Physical Exam:     Physical Exam   Constitutional: He is oriented to person, place, and time. He appears well-developed and well-nourished. No distress.   HENT:   Head: Normocephalic and atraumatic.   Eyes: Conjunctivae and EOM are normal. Right eye exhibits no discharge. Left eye exhibits no discharge. No scleral icterus.   Neck: Normal range of motion. Neck supple. No thyromegaly present.   Cardiovascular: Normal rate, regular rhythm and normal heart sounds. Exam reveals no gallop and no friction rub.   Pulmonary/Chest: Effort normal. No stridor. No respiratory distress. He has no wheezes.   Port    Abdominal: Soft. Bowel sounds are normal. He exhibits no mass. There is no tenderness. There is no rebound and no guarding.   Musculoskeletal: Normal range of motion. He exhibits no tenderness.   Lymphadenopathy:     He has no cervical adenopathy.   Neurological: He is alert and oriented to person, place, and time. He exhibits normal muscle tone.   Skin: Skin is warm. No rash noted. He is not diaphoretic. No erythema.   Psychiatric: He has a normal mood and affect. His behavior is normal.   Nursing note and vitals reviewed.    I have reviewed and confirmed the accuracy of the patient's history: Chief complaint, HPI, ROS and Subjective as entered by the MA/MAICOLN/RN. Jackson Peters MD 07/31/20     Lab Results - Last 18 Months   Lab Units 07/30/20  0810 07/27/20  1125 07/21/20  1316   WBC 10*3/mm3 2.72* 2.44* 4.59   HEMOGLOBIN g/dL 11.8* 12.5* 13.4   HEMATOCRIT % 34.1* 37.4* 39.6   PLATELETS 10*3/mm3 198 191 212   MCV fL 83.0 83.3 84.3     Lab Results - Last 18 Months   Lab Units 07/30/20  0810 " 07/27/20  1125 07/21/20  1316 07/14/20  0829   SODIUM mmol/L 136 135* 139 139   POTASSIUM mmol/L 4.2 4.2 4.9 4.3   CHLORIDE mmol/L 102 106 104 104   TOTAL CO2 mmol/L  --  20*  --   --    CO2 mmol/L 21.0*  --  22.0 22.0   BUN  15 14 26* 14   CREATININE mg/dL 0.78 0.7 1.11 0.82   CALCIUM mg/dL 9.2 9.2 9.9 9.4   BILIRUBIN mg/dL 0.3 0.4 0.3 0.3   ALK PHOS U/L 84 82 74 82   ALT (SGPT) U/L 17 24 28 15   AST (SGOT) U/L 18 31 27 16   GLUCOSE mg/dL 222*  --  106* 141*     Lab Results   Component Value Date    GLUCOSE 222 (H) 07/30/2020    BUN  07/30/2020      Comment:      Testing performed by alternate method    BUN 15 07/30/2020    CREATININE 0.78 07/30/2020    EGFRIFNONA 102 07/30/2020    BCR  07/30/2020      Comment:      Testing not performed    K 4.2 07/30/2020    CO2 21.0 (L) 07/30/2020    CALCIUM 9.2 07/30/2020    ALBUMIN 3.80 07/30/2020    LABIL2 1.3 07/27/2020    AST 18 07/30/2020    ALT 17 07/30/2020       Lab Results - Last 18 Months   Lab Units 07/27/20  1125   INR INR 1.0   APTT s 31.5     Lab Results   Component Value Date    IRON 31 (L) 05/28/2020    TIBC 359 05/28/2020    FERRITIN 104.70 05/28/2020     No results found for: FOLATE    No results found for: OCCULTBLD    No results found for: RETICCTPCT    No results found for: KLNHOXFJ92  No results found for: SPEP, UPEP  No results found for: LDH, URICACID  No results found for: ALFREDA, RF, SEDRATE  No results found for: FIBRINOGEN, HAPTOGLOBIN  Lab Results   Component Value Date    PTT 31.5 07/27/2020    INR 1.0 07/27/2020     No results found for:   Lab Results   Component Value Date    CEA 16.40 07/15/2020     No components found for: CA-19-9  No results found for: PSA    Assessment/Plan     Assessment:  1. 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  He does have retroperitoneal lymph node involvement as well as celiac lymph node metastases.  Mass causing liver  capsule invasion.  Patient presented with dysphagia and had 30 pound weight loss.  Started palliative chemotherapy with  FLOT and has received 3 cycles so far.  Tolerating chemotherapy well so far.  2. Pulmonary embolism: Started on Eliquis .  Thrombosis very likely secondary to malignancy.   3. Anemia/leukopenia: Due to myelosuppression from chemotherapy  4. Iron deficiency  5. Weight loss: G-tube may be considered if he continues to lose weight.          Plan:  1. Continue with chemotherapy with FLOT.  He is tolerating the treatment very well..  Explained that his disease is stage IV but we are trying to  downstage him with the chemotherapy and see if his disease can get borderline resectable.  2. Finished 3 cycles so far..  Plan is to hold cycle 4 and proceed with a laparoscopic staging at [facility] with Dr. Solo.  3. We will manage perioperative thromboprophylaxis for the upcoming surgery.  Based on the results, will discuss if downstaging the disease to consider surgical options is possible..   4. Recommended to hold Eliquis at least 3 days before the surgery date.  Advised to use Lovenox 120 mg every morning for 3 days before surgery.  Prescription provided.  5. Continue Zofran PRN.  6. Monitor closely for chemotherapy related adverse events.  7. Continue iron supplementation.  8. Continue Eliquis 5 twice daily.    9. We will continue to monitor CEA and CA-19-9  10. We will follow-up in about 3 weeks after laparoscopic surgery.      I have reviewed and confirmed the accuracy of the patient's history: Chief complaint, HPI, ROS and Subjective as entered by the MA/LPN/RN. Jackson Peters MD 07/31/20     I have reviewed all relevant outside reports, notes, labs results, imaging, vitals, medications and plan with the patient today.    Portions of the note have been Scribed by medical assistant/Nurse.  I have reviewed and made changes accordingly.        Electronically signed by Jackson Peters MD,  07/31/20, 1:52 PM.

## 2020-08-18 NOTE — PROGRESS NOTES
HEMATOLOGY ONCOLOGY OUTPATIENT FOLLOW UP       Patient name: Tyree Orourke  : 1960  MRN: [id number]  Primary Care Physician: Glenys Brewster MD  Referring Physician: Glenys Brewster MD  Reason For Consult:     Chief Complaint   Patient presents with   • Follow-up     Malignant neoplasm of lower third of esophagus    Poorly differentiated signet ring gastric carcinoma.       HPI:   History of Present Illness:  Tyree Orourke is 60 y.o. 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  He reports symptoms of GERD  many years ago, took Tums regularly then went away; about 15 years remotely.  He unfortunately developed GI symptoms about 2 months ago.  His weight down 20 lbs in 2 months. He denied any pain or heartburn.  Over the last two months, he does report SOB easily with exertion.  He saw his PCP Dr. Brewster who ordered work-up. He has had the following imaging and work-up/biospy:.  Patient is not a smoker and quit about 20 years ago.  He works as a supervisor at [employer].    · 2020: Creatinine 0.85, ALT 15, AST 21 alk phos 100, bilirubin 0.6, albumin 3.7, calcium 9.0,  · 2020: Patient underwent upper GI endoscopy: Surgical pathology of gastric fundus biopsy shows poorly differentiated carcinoma with signet ring cell features involving cardio oxyntic mucosa.  Gastric antrum biopsy shows chronic gastritis.  Negative for H. Pylori.  · 2020: CT chest and abdomen with contrast: There is ill-defined low-density lesion posteriorly in the right lobe of the liver which could be a metastatic lesion, measuring 2 cm,.  There is a significant thickening throughout the GE junction region extending to the level of the proximal stomach, suspicious for tumor.  Soft tissue planes are ill-defined at the inferior aspect of this area due to tumor  infiltration.  There is moderate adenopathy in the retroperitoneum and periceliac region.  Evidence of PE.    · 5/14/2020: CT angiogram of the chest with contrast: Bilateral segmental pulmonary emboli without definitive evidence of right heart strain.  Small nodule along the left major fissure consistent with a intrapulmonary lymph node.  Redemonstration of lower esophageal/gastric mass with surrounding lymphadenopathy and a hypoattenuating lesion in the right hepatic lobe.      · 5/18/2020: The patient is here for his Initial Consultation  appointment with Dr. Peters from Dr. Pettit. He has had symptoms for about 2 months. The patient denies a lot of pain. The patient has lost at least 20lbs since the symptoms started. He denies any heartburn and has been taking Zantac BID to help prevent developing it. He will d/c that and start taking Pepcid. He says that he gets SOB easily with exertion. The patient denies any past medical problems except Lupus and high blood pressure. The patient stated he is supposed to fly out on a trip to [place] tomorrow until Saturday for a golf trip. The patient is being given Eliquis samples 10mg BID for one week and then 5mg BID after that.   · 5/18/2020: Patient started Eliquis.    · 5/26/2020: PET CT scan: There is metabolic activity around the area of the GE junction with thickening of the wall of the bowel in this area.  Maximum SUV 4.6.  No areas of abnormal metabolic activity are seen within the abdomen or pelvis.  There are gallstones within the gallbladder without inflammation.  · 5/28/2020 CEA 13.3, d-dimer 3.28 high, ferritin 104.7, iron 31 low, iron saturation 9% low, TIBC 359,  · 5/29/2020: MRI abdomen with and without contrast: Hemangioma within the right lobe of the liver measuring 2.1 cm.  Stable appearance of multiple retroperitoneal lymph nodes, some of which are considered pathological based on size.  They would be suspicious for metastasis.  Nodular thickening and  enhancement of the distal esophagus and fundus of the stomach.    · 6/9/2020: Endoscopic ultrasound by Dr. Singleton.:A large GE junction mass starting at 36 cm and traversing into the stomach/cardia.  The majority of this has broken into or through the muscularis propria and there is a location where there is a loss of plane between the liver and the mass suggestive of possible invasion of the hepatic capsule making this a T4 lesion.  At least 4 lymph nodes in the celiac axis were visualized with the largest measuring 1.7 cm.  They are round and hypoechoic suggestive of underlying malignancy.  These cannot be biopsied as they were directly behind the mass.   Normal pancreatic uncinate head, neck body and tail.  Normal ampulla and common bile duct.  · 6/15/2020: WBC 7.8, hemoglobin 13.1, platelets 345, MCV 84.2, creatinine 0.83,  · 6/30/2020: Patient received cycle 1 of FLOT, WBC 6.1, hemoglobin 12.8, platelets 286, creatinine 0.86, LFTs normal, CA-19-9 628.5 high, CEA 15.1,  · 7/7/2020: WBC 3.88, hemoglobin 13, platelets 229  · 7/14/2020: Patient received cycle 2 of FLOT  · 7/27/2020 WBC 2.44, hemoglobin 12.5, platelets 191, creatinine 0.7  · 7/30/2020: Patient received a cycle 3 of FLOT.  WBC 2.7, hemoglobin 11.8, platelets 198, creatinine 0.78  · 8/13/2020: Patient underwent diagnostic laparoscopy and peritoneal washings at [facility] with Dr. Solo.  · 8/13/2020: GE junction mass biopsy: Adenocarcinoma with mucinous features and focal signet ring cells.  · 8/13/2020: Ascites fluid: No malignant cells noted.  However peritoneal washings are positive for rare malignant cells.    Subjective:     Patient presents for follow-up after his recent diagnostic laparoscopy at [facility] with Dr. Solo.  He has recovered well from the surgery.  He had undergone peritoneal washings and there are some malignant cells.  Today he reports symptoms of foot drop in his right ankle.  Has slight tingling.  He denies any  numbness or neuropathy in any of his other extremities.      The following portions of the patient's history were reviewed and updated as appropriate: allergies, current medications, past family history, past medical history, past social history, past surgical history and problem list.    Past Medical History:   Diagnosis Date   • Esophageal cancer (CMS/HCC)     esophageal    • GERD (gastroesophageal reflux disease)    • Hypertension    • Lupus (CMS/HCC)     skin     Past Surgical History:   Procedure Laterality Date   • ENDOSCOPY     • REPLACEMENT TOTAL KNEE Left 04/2019   • UPPER ENDOSCOPIC ULTRASOUND W/ FNA N/A 6/9/2020    Procedure: Endoscopic ULTRASOUND;  Surgeon: Uriel Singleton MD;  Location: [facility] ENDOSCOPY;  Service: Gastroenterology;  Laterality: N/A;  Post operative diagnosis: mass, lymph nodes   • VENOUS ACCESS DEVICE (PORT) INSERTION Left 6/15/2020    Procedure: INSERTION VENOUS ACCESS DEVICE LEFT SUBCLAVIAN UNDER FLOUROSCOPIC GUIDANCE;  Surgeon: Tyree Adkins MD;  Location: [facility] MAIN OR;  Service: Cardiothoracic;  Laterality: Left;       Current Outpatient Medications:   •  apixaban (ELIQUIS) 5 MG tablet tablet, Take 1 tablet by mouth Every 12 (Twelve) Hours. (Patient taking differently: Take 5 mg by mouth Daily.), Disp: 60 tablet, Rfl: 3  •  dexamethasone (DECADRON) 4 MG tablet, Take 2 tablets oral twice a day for 3 consecutive days beginning the day before chemotherapy and continue for 6 doses., Disp: 12 tablet, Rfl: 7  •  famotidine (PEPCID) 20 MG tablet, Take 40 mg by mouth Daily., Disp: , Rfl:   •  ferrous gluconate (FERGON) 324 MG tablet, Take 1 tablet by mouth 2 (two) times a day., Disp: 60 tablet, Rfl: 0  •  ferrous sulfate 325 (65 FE) MG tablet, Take 325 mg by mouth Daily., Disp: , Rfl:   •  lisinopril (PRINIVIL,ZESTRIL) 10 MG tablet, Take 10 mg by mouth Daily. Take preop, Disp: , Rfl:   •  Multiple Vitamins-Minerals (MULTIVITAMIN ADULT PO), Take  by mouth., Disp: , Rfl:   •   ondansetron (ZOFRAN) 8 MG tablet, Take 1 tablet by mouth 3 (Three) Times a Day As Needed for Nausea or Vomiting., Disp: 30 tablet, Rfl: 5    No Known Allergies    Family History   Problem Relation Age of Onset   • Leukemia Mother         CLL     Cancer-related family history is not on file.    Social History     Tobacco Use   • Smoking status: Never Smoker   • Smokeless tobacco: Never Used   Substance Use Topics   • Alcohol use: Yes     Frequency: 2-4 times a month     Comment: Socially   • Drug use: Never     Social History     Social History Narrative    Patient lives in [place], [place], with his wife. He has one child. He does not smoke. He works at [employer].      ROS:     Review of Systems   Constitutional: Positive for fatigue. Negative for activity change, chills and fever.   HENT: Negative for drooling, ear discharge, mouth sores, nosebleeds and sore throat.    Eyes: Negative for photophobia, pain, redness and visual disturbance.   Respiratory: Negative for cough, choking, shortness of breath and wheezing.    Cardiovascular: Negative for chest pain and palpitations.   Gastrointestinal: Negative for abdominal pain, diarrhea, nausea and vomiting.        Slight dysphagia/regurgitation   Genitourinary: Negative for dysuria and urgency.   Musculoskeletal: Negative for neck stiffness.   Skin: Negative for rash.   Neurological: Negative for seizures, syncope and speech difficulty.        Reports right foot drop.   Psychiatric/Behavioral: Negative for agitation, confusion and hallucinations.     I have reviewed and confirmed the accuracy of the patient's history which includes chief complaint, subjective, HPI, ROS,as entered by the MA/LPN/RN.    Objective:  Temperature 97.8, pulse 70, respirations 18, blood pressure 125/80    There were no vitals filed for this visit.  There is no height or weight on file to calculate BMI.  ECOG  (0) Fully active, able to carry on all predisease performance without restriction    Physical  Exam:     Physical Exam   Constitutional: He is oriented to person, place, and time. He appears well-developed and well-nourished. No distress.   HENT:   Head: Normocephalic and atraumatic.   Eyes: Conjunctivae and EOM are normal. Right eye exhibits no discharge. Left eye exhibits no discharge. No scleral icterus.   Neck: Normal range of motion. Neck supple. No thyromegaly present.   Cardiovascular: Normal rate, regular rhythm and normal heart sounds. Exam reveals no gallop and no friction rub.   Pulmonary/Chest: Effort normal. No stridor. No respiratory distress. He has no wheezes.   Port    Abdominal: Soft. Bowel sounds are normal. He exhibits no mass. There is no tenderness. There is no rebound and no guarding.   Musculoskeletal: Normal range of motion. He exhibits no tenderness.   Lymphadenopathy:     He has no cervical adenopathy.   Neurological: He is alert and oriented to person, place, and time. He exhibits normal muscle tone.   Right foot drop   Skin: Skin is warm. No rash noted. He is not diaphoretic. No erythema.   Psychiatric: He has a normal mood and affect. His behavior is normal.   Nursing note and vitals reviewed.    I have reviewed and confirmed the accuracy of the patient's history: Chief complaint, HPI, ROS and Subjective as entered by the MA/MAICOLN/RN. Jackson Peters MD 08/18/20     Lab Results - Last 18 Months   Lab Units 07/30/20  0810 07/27/20  1125 07/21/20  1316   WBC 10*3/mm3 2.72* 2.44* 4.59   HEMOGLOBIN g/dL 11.8* 12.5* 13.4   HEMATOCRIT % 34.1* 37.4* 39.6   PLATELETS 10*3/mm3 198 191 212   MCV fL 83.0 83.3 84.3     Lab Results - Last 18 Months   Lab Units 07/30/20  0810 07/27/20  1125 07/21/20  1316 07/14/20  0829   SODIUM mmol/L 136 135* 139 139   POTASSIUM mmol/L 4.2 4.2 4.9 4.3   CHLORIDE mmol/L 102 106 104 104   TOTAL CO2 mmol/L  --  20*  --   --    CO2 mmol/L 21.0*  --  22.0 22.0   BUN  15 14 26* 14   CREATININE mg/dL 0.78 0.7 1.11 0.82   CALCIUM mg/dL 9.2 9.2 9.9 9.4   BILIRUBIN mg/dL  0.3 0.4 0.3 0.3   ALK PHOS U/L 84 82 74 82   ALT (SGPT) U/L 17 24 28 15   AST (SGOT) U/L 18 31 27 16   GLUCOSE mg/dL 222*  --  106* 141*     Lab Results   Component Value Date    GLUCOSE 222 (H) 07/30/2020    BUN  07/30/2020      Comment:      Testing performed by alternate method    BUN 15 07/30/2020    CREATININE 0.78 07/30/2020    EGFRIFNONA 102 07/30/2020    BCR  07/30/2020      Comment:      Testing not performed    K 4.2 07/30/2020    CO2 21.0 (L) 07/30/2020    CALCIUM 9.2 07/30/2020    ALBUMIN 3.80 07/30/2020    LABIL2 1.3 07/27/2020    AST 18 07/30/2020    ALT 17 07/30/2020       Lab Results - Last 18 Months   Lab Units 08/13/20  0623 07/27/20  1125   INR INR 1.2 1.0   APTT s  --  31.5     Lab Results   Component Value Date    IRON 31 (L) 05/28/2020    TIBC 359 05/28/2020    FERRITIN 104.70 05/28/2020     No results found for: FOLATE    No results found for: OCCULTBLD    No results found for: RETICCTPCT    No results found for: [id number]  No results found for: SPEP, UPEP  No results found for: LDH, URICACID  No results found for: ALFREDA, RF, SEDRATE  No results found for: FIBRINOGEN, HAPTOGLOBIN  Lab Results   Component Value Date    PTT 31.5 07/27/2020    INR 1.2 08/13/2020     No results found for:   Lab Results   Component Value Date    CEA 16.40 07/15/2020     No components found for: CA-19-9  No results found for: PSA    Assessment/Plan     Assessment:  1. 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  He does have retroperitoneal lymph node involvement as well as celiac lymph node metastases.  Mass causing liver capsule invasion.  Patient presented with dysphagia and had 30 pound weight loss.  Started palliative chemotherapy with  FLOT and has received 3 cycles so far.  Tolerating chemotherapy well so far.  Patient had laparoscopy and peritoneal lavage that showed some malignant cells.  2. Pulmonary embolism:  Started on Eliquis .  Thrombosis very likely secondary to malignancy.   3. Anemia/leukopenia: Due to myelosuppression from chemotherapy  4. Iron deficiency  5. Right foot drop: Unclear if this is due to chemotherapy.  Does not have neuropathy in other extremities.  Consult physical therapy        Plan:  1. We will resume chemotherapy with FLOT.  Peritoneal fluid is evidence of malignant cells.  However he does not have any gross peritoneal disease.  Discussed the case with Dr. Solo.  We may also consider HIPEC in the future.    2. We will plan restaging PET scan after 6 cycles.  3. Continue Eliquis.  4. Foot drop: Advised to take multivitamins including B complex.  Watch closely for neuropathy..  Will provide ankle brace.  5. Continue Zofran PRN.  6. Monitor closely for chemotherapy related adverse events.  7. Continue iron supplementation.  8. Continue Eliquis 5 twice daily.    9. We will continue to monitor CEA and CA-19-9  10. Follow-up in 4 weeks        I have reviewed and confirmed the accuracy of the patient's history: Chief complaint, HPI, ROS and Subjective as entered by the MA/LPN/RN. Jackson Peters MD 08/18/20     I have reviewed all relevant outside reports, notes, labs results, imaging, vitals, medications and plan with the patient today.    Portions of the note have been Scribed by medical assistant/Nurse.  I have reviewed and made changes accordingly.    I have spent greater than 40 minutes in face-to-face time with the patient and 95% of the time was spent in counseling and coordination of care, including review of imaging, pathology, indications for treatment, goals of therapy, alternatives and risks,         Electronically signed by Jackson Peters MD, 08/18/20, 4:48 PM.

## 2020-08-21 NOTE — PROGRESS NOTES
Pt. Here at clinic to day for 5FU pump take down  Pt. Has no complaints today, 5FU pump complete, port flushed with blood return noted, port deaccessed per protocol  Pt. Tolerated treatment well and discharged with no complaints.

## 2020-09-04 NOTE — PROGRESS NOTES
Pt. Here at clinic for 5FU pump take down  Pt. Has no complaints today, 5FU pump complete and port flushed with blood return noted  Port deaccessed per protocol, pt. Discharged from clinic with no complaints and pt. Has schedule for next visit.

## 2020-09-09 NOTE — TELEPHONE ENCOUNTER
PT wife called to move tomorrows appt to around 3:30 as they have a conflicting appt. Please call pt back @[phone] with new appt time

## 2020-09-15 NOTE — PROGRESS NOTES
HEMATOLOGY ONCOLOGY OUTPATIENT FOLLOW UP       Patient name: Tyree Orourke  : 1960  MRN: [id number]  Primary Care Physician: Glenys Brewster MD  Referring Physician: Glenys Brewster MD  Reason For Consult:     Chief Complaint   Patient presents with   • Follow-up     Malignant neoplasm of lower third of esophagus   Poorly differentiated signet ring gastric carcinoma.       HPI:   History of Present Illness:  Tyree Orourke is 60 y.o. 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  He reports symptoms of GERD  many years ago, took Tums regularly then went away; about 15 years remotely.  He unfortunately developed GI symptoms about 2 months ago.  His weight down 20 lbs in 2 months. He denied any pain or heartburn.  Over the last two months, he does report SOB easily with exertion.  He saw his PCP Dr. Brewster who ordered work-up. He has had the following imaging and work-up/biospy:.  Patient is not a smoker and quit about 20 years ago.  He works as a supervisor at [employer].    · 2020: Creatinine 0.85, ALT 15, AST 21 alk phos 100, bilirubin 0.6, albumin 3.7, calcium 9.0,  · 2020: Patient underwent upper GI endoscopy: Surgical pathology of gastric fundus biopsy shows poorly differentiated carcinoma with signet ring cell features involving cardio oxyntic mucosa.  Gastric antrum biopsy shows chronic gastritis.  Negative for H. Pylori.  · 2020: CT chest and abdomen with contrast: There is ill-defined low-density lesion posteriorly in the right lobe of the liver which could be a metastatic lesion, measuring 2 cm,.  There is a significant thickening throughout the GE junction region extending to the level of the proximal stomach, suspicious for tumor.  Soft tissue planes are ill-defined at the inferior aspect of this area due to tumor  infiltration.  There is moderate adenopathy in the retroperitoneum and periceliac region.  Evidence of PE.    · 5/14/2020: CT angiogram of the chest with contrast: Bilateral segmental pulmonary emboli without definitive evidence of right heart strain.  Small nodule along the left major fissure consistent with a intrapulmonary lymph node.  Redemonstration of lower esophageal/gastric mass with surrounding lymphadenopathy and a hypoattenuating lesion in the right hepatic lobe.      · 5/18/2020: The patient is here for his Initial Consultation  appointment with Dr. Peters from Dr. Pettit. He has had symptoms for about 2 months. The patient denies a lot of pain. The patient has lost at least 20lbs since the symptoms started. He denies any heartburn and has been taking Zantac BID to help prevent developing it. He will d/c that and start taking Pepcid. He says that he gets SOB easily with exertion. The patient denies any past medical problems except Lupus and high blood pressure. The patient stated he is supposed to fly out on a trip to [place] tomorrow until Saturday for a golf trip. The patient is being given Eliquis samples 10mg BID for one week and then 5mg BID after that.   · 5/18/2020: Patient started Eliquis.    · 5/26/2020: PET CT scan: There is metabolic activity around the area of the GE junction with thickening of the wall of the bowel in this area.  Maximum SUV 4.6.  No areas of abnormal metabolic activity are seen within the abdomen or pelvis.  There are gallstones within the gallbladder without inflammation.  · 5/28/2020 CEA 13.3, d-dimer 3.28 high, ferritin 104.7, iron 31 low, iron saturation 9% low, TIBC 359,  · 5/29/2020: MRI abdomen with and without contrast: Hemangioma within the right lobe of the liver measuring 2.1 cm.  Stable appearance of multiple retroperitoneal lymph nodes, some of which are considered pathological based on size.  They would be suspicious for metastasis.  Nodular thickening and  enhancement of the distal esophagus and fundus of the stomach.    · 6/9/2020: Endoscopic ultrasound by Dr. Singleton.:A large GE junction mass starting at 36 cm and traversing into the stomach/cardia.  The majority of this has broken into or through the muscularis propria and there is a location where there is a loss of plane between the liver and the mass suggestive of possible invasion of the hepatic capsule making this a T4 lesion.  At least 4 lymph nodes in the celiac axis were visualized with the largest measuring 1.7 cm.  They are round and hypoechoic suggestive of underlying malignancy.  These cannot be biopsied as they were directly behind the mass.   Normal pancreatic uncinate head, neck body and tail.  Normal ampulla and common bile duct.  · 6/15/2020: WBC 7.8, hemoglobin 13.1, platelets 345, MCV 84.2, creatinine 0.83,  · 6/30/2020: Patient received cycle 1 of FLOT, WBC 6.1, hemoglobin 12.8, platelets 286, creatinine 0.86, LFTs normal, CA-19-9 628.5 high, CEA 15.1,  · 7/7/2020: WBC 3.88, hemoglobin 13, platelets 229  · 7/14/2020: Patient received cycle 2 of FLOT  · 7/27/2020 WBC 2.44, hemoglobin 12.5, platelets 191, creatinine 0.7  · 7/30/2020: Patient received a cycle 3 of FLOT.  WBC 2.7, hemoglobin 11.8, platelets 198, creatinine 0.78  · 8/13/2020: Patient underwent diagnostic laparoscopy and peritoneal washings at [facility] with Dr. Solo.  · 8/13/2020: GE junction mass biopsy: Adenocarcinoma with mucinous features and focal signet ring cells.  · 8/13/2020: Ascites fluid: No malignant cells noted.  However peritoneal washings are positive for rare malignant cells.  · 8/20/2020: Patient received cycle 4 of FLOT.  WBC 11.2, hemoglobin 12.3, platelets 280, creatinine 1, CA-19-9 207.5 high, CEA 4.4,  · 8/27/2020: WBC 5.1, hemoglobin 11.5, platelet count 166  · 9/3/2020: Patient received cycle 5 of FLOT.  WBC 2.1, hemoglobin 11.8, platelets 171, creatinine 0.8,  · 19 2020: WBC 2.56, hemoglobin 11.9,  platelets 162, ,  · 9/17/2020: Cycle 6 chemotherapy held due to neutropenia with a WBC 1.5,, ,      Subjective:   Patient presents for follow-up regarding esophageal cancer. He was scheduled for treatment today, but his counts were too low. He had laparoscopic surgery with Dr. Solo.  He has recovered well from the surgery. He reports some numbness and tingling in the right foot. He had a follow up with Dr. Tiwari for this and is scheduled for a splint fitting. He is currently on Eliquis BID.  His appetite is okay.  Has slight fatigue.  Denies any numbness in his hands.  Wants to continue chemo.      The following portions of the patient's history were reviewed and updated as appropriate: allergies, current medications, past family history, past medical history, past social history, past surgical history and problem list.    Past Medical History:   Diagnosis Date   • Esophageal cancer (CMS/HCC)     esophageal    • GERD (gastroesophageal reflux disease)    • Hypertension    • Lupus (CMS/HCC)     skin     Past Surgical History:   Procedure Laterality Date   • ENDOSCOPY     • REPLACEMENT TOTAL KNEE Left 04/2019   • UPPER ENDOSCOPIC ULTRASOUND W/ FNA N/A 6/9/2020    Procedure: Endoscopic ULTRASOUND;  Surgeon: Uriel Singleton MD;  Location: [facility] ENDOSCOPY;  Service: Gastroenterology;  Laterality: N/A;  Post operative diagnosis: mass, lymph nodes   • VENOUS ACCESS DEVICE (PORT) INSERTION Left 6/15/2020    Procedure: INSERTION VENOUS ACCESS DEVICE LEFT SUBCLAVIAN UNDER FLOUROSCOPIC GUIDANCE;  Surgeon: Tyree Adkins MD;  Location: [facility] MAIN OR;  Service: Cardiothoracic;  Laterality: Left;       Current Outpatient Medications:   •  apixaban (ELIQUIS) 5 MG tablet tablet, Take 1 tablet by mouth Every 12 (Twelve) Hours. (Patient taking differently: Take 5 mg by mouth Daily.), Disp: 60 tablet, Rfl: 3  •  dexamethasone (DECADRON) 4 MG tablet, Take 2 tablets oral twice a day for 3 consecutive days  beginning the day before chemotherapy and continue for 6 doses., Disp: 12 tablet, Rfl: 7  •  famotidine (PEPCID) 20 MG tablet, Take 40 mg by mouth Daily., Disp: , Rfl:   •  ferrous gluconate (FERGON) 324 MG tablet, Take 1 tablet by mouth twice daily, Disp: 60 tablet, Rfl: 0  •  ferrous sulfate 325 (65 FE) MG tablet, Take 325 mg by mouth Daily., Disp: , Rfl:   •  lisinopril (PRINIVIL,ZESTRIL) 10 MG tablet, Take 10 mg by mouth Daily. Take preop, Disp: , Rfl:   •  Multiple Vitamins-Minerals (MULTIVITAMIN ADULT PO), Take  by mouth., Disp: , Rfl:   •  ondansetron (ZOFRAN) 8 MG tablet, Take 1 tablet by mouth 3 (Three) Times a Day As Needed for Nausea or Vomiting., Disp: 30 tablet, Rfl: 5    No Known Allergies    Family History   Problem Relation Age of Onset   • Leukemia Mother         CLL     Cancer-related family history is not on file.    Social History     Tobacco Use   • Smoking status: Never Smoker   • Smokeless tobacco: Never Used   Substance Use Topics   • Alcohol use: Yes     Frequency: 2-4 times a month     Comment: Socially   • Drug use: Never     Social History     Social History Narrative    Patient lives in [place], [place], with his wife. He has one child. He does not smoke. He works at [employer].      ROS:     Review of Systems   Constitutional: Positive for fatigue. Negative for activity change, chills and fever.   HENT: Negative for drooling, ear discharge, mouth sores, nosebleeds and sore throat.    Eyes: Negative for photophobia, pain, redness and visual disturbance.   Respiratory: Negative for cough, choking, shortness of breath and wheezing.    Cardiovascular: Negative for chest pain and palpitations.   Gastrointestinal: Negative for abdominal pain, diarrhea, nausea and vomiting.        Slight dysphagia/regurgitation   Genitourinary: Negative for dysuria and urgency.   Musculoskeletal: Negative for neck stiffness.   Skin: Negative for rash.   Neurological: Negative for tremors, seizures, syncope and speech  "difficulty.        Reports right foot drop.   Psychiatric/Behavioral: Negative for agitation, confusion and hallucinations.     I have reviewed and confirmed the accuracy of the patient's history which includes chief complaint, subjective, HPI, ROS,as entered by the MA/LPN/RN.    Objective:  Temperature 97.8, pulse 70, respirations 18, blood pressure 125/80    Vitals:    09/17/20 0838   BP: 145/84   Pulse: 57   Resp: 16   Temp: 97.3 °F (36.3 °C)   Weight: 77.1 kg (170 lb)   Height: 177.8 cm (70\")     Body mass index is 24.39 kg/m².  ECOG  (0) Fully active, able to carry on all predisease performance without restriction    Physical Exam:     Physical Exam   Constitutional: He is oriented to person, place, and time. He appears well-developed. No distress.   HENT:   Head: Normocephalic and atraumatic.   Eyes: Conjunctivae are normal. Right eye exhibits no discharge. Left eye exhibits no discharge. No scleral icterus.   Neck: Normal range of motion. Neck supple. No thyromegaly present.   Cardiovascular: Normal rate, regular rhythm and normal heart sounds. Exam reveals no gallop and no friction rub.   Pulmonary/Chest: Effort normal. No stridor. No respiratory distress. He has no wheezes.   Port    Abdominal: Soft. Normal appearance and bowel sounds are normal. He exhibits no mass. There is no abdominal tenderness. There is no rebound and no guarding.   Musculoskeletal: Normal range of motion. No swelling or tenderness.   Lymphadenopathy:     He has no cervical adenopathy.   Neurological: He is alert and oriented to person, place, and time. He exhibits normal muscle tone.   Right foot drop   Skin: Skin is warm. No rash noted. He is not diaphoretic. No erythema.   Psychiatric: His behavior is normal.   Nursing note and vitals reviewed.    I have reviewed and confirmed the accuracy of the patient's history: Chief complaint, HPI, ROS and Subjective as entered by the MA/MAICOLN/RN. Jackson Peters MD 09/17/20     Lab Results - Last " 18 Months   Lab Units 09/17/20  0808 09/10/20  1506 09/03/20  0819   WBC 10*3/mm3 1.51* 2.56* 2.12*   HEMOGLOBIN g/dL 11.3* 11.9* 11.8*   HEMATOCRIT % 32.4* 35.1* 34.0*   PLATELETS 10*3/mm3 163 162 171   MCV fL 85.0 85.6 84.6     Lab Results - Last 18 Months   Lab Units 09/17/20  0808 09/03/20  0819 08/20/20  0750 07/30/20  0810   SODIUM mmol/L 137 134* 136 136   POTASSIUM mmol/L 4.1 4.2 4.5 4.2   CHLORIDE mmol/L 103 101 100 102   CO2 mmol/L 21.0* 22.0 24.0 21.0*   BUN   --  10 12 15   CREATININE mg/dL 0.80 0.81 1.00 0.78   CALCIUM mg/dL 9.1 9.4 9.5 9.2   BILIRUBIN mg/dL 0.4 0.4 0.3 0.3   ALK PHOS U/L 85 91 81 84   ALT (SGPT) U/L 20 18 18 17   AST (SGOT) U/L 29 23 20 18   GLUCOSE mg/dL 170* 166* 203* 222*     Lab Results   Component Value Date    GLUCOSE 170 (H) 09/17/2020    BUN  09/17/2020      Comment:      Testing performed by alternate method    CREATININE 0.80 09/17/2020    EGFRIFNONA 99 09/17/2020    BCR  09/17/2020      Comment:      Testing not performed    K 4.1 09/17/2020    CO2 21.0 (L) 09/17/2020    CALCIUM 9.1 09/17/2020    ALBUMIN 3.40 (L) 09/17/2020    LABIL2 1.3 07/27/2020    AST 29 09/17/2020    ALT 20 09/17/2020       Lab Results - Last 18 Months   Lab Units 08/13/20  0623 07/27/20  1125   INR INR 1.2 1.0   APTT s  --  31.5     Lab Results   Component Value Date    IRON 31 (L) 05/28/2020    TIBC 359 05/28/2020    FERRITIN 104.70 05/28/2020     No results found for: FOLATE    No results found for: OCCULTBLD    No results found for: RETICCTPCT    No results found for: [id number]  No results found for: SPEP, UPEP  No results found for: LDH, URICACID  No results found for: ALFREDA, RF, SEDRATE  No results found for: FIBRINOGEN, HAPTOGLOBIN  Lab Results   Component Value Date    PTT 31.5 07/27/2020    INR 1.2 08/13/2020     No results found for:   Lab Results   Component Value Date    CEA 4.44 08/20/2020     No components found for: CA-19-9  No results found for: PSA    Assessment/Plan      Assessment:  1. 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  He does have retroperitoneal lymph node involvement as well as celiac lymph node metastases.  Mass causing liver capsule invasion.  Patient presented with dysphagia and had 30 pound weight loss.  Started palliative chemotherapy with  FLOT and has received 3 cycles so far.  Tolerating chemotherapy well so far.  Patient had laparoscopy and peritoneal lavage that showed some malignant cells.  2. Pulmonary embolism: Started on Eliquis .  Thrombosis very likely secondary to malignancy.   3. Myelosuppression: Cycle 6 was delayed on 9/17/2020 due to myelosuppression.  4. Anemia/leukopenia: Due to myelosuppression from chemotherapy  5. Iron deficiency  6. Right foot drop: Unclear if this is due to chemotherapy.  Does not have neuropathy in other extremities.  Patient saw orthopedics.  They have recommended a splint.      Plan:  1. Due to myelosuppression, will reduce the dose of Taxotere as well as oxaliplatin by 20%.  2. Restaging PET CT scan in about 3 weeks.  3. Continue Eliquis.  4. Foot drop: Advised to take multivitamins including B complex.  Watch closely for neuropathy..  Will provide ankle brace.  5. Continue Zofran PRN.  6. Monitor closely for chemotherapy related adverse events.  7. Continue iron supplementation.  8. Continue Eliquis 5 twice daily.    9. We will continue to monitor CEA and CA-19-9  10. Follow-up in 3 weeks after the scan.         I have reviewed and confirmed the accuracy of the patient's history: Chief complaint, HPI, ROS and Subjective as entered by the MA/LPN/RN. Jackson Peters MD 09/17/20     I have reviewed all relevant outside reports, notes, labs results, imaging, vitals, medications and plan with the patient today.    Portions of the note have been Scribed by medical assistant/Nurse.  I have reviewed and made changes accordingly.    I  have spent greater than 40 minutes in face-to-face time with the patient and 95% of the time was spent in counseling and coordination of care, including review of imaging, pathology, indications for treatment, goals of therapy, alternatives and risks,         Electronically signed by Jackson Peters MD, 08/18/20, 4:48 PM.

## 2020-09-17 NOTE — PROGRESS NOTES
Patient here for Docetaxel, oxalplatin, Leucovorin, 5FU, ANC is 0.63. Spoke to YONATHAN Ham and she advised treatment needs to be held till Tuesday. Patient is going to keep appointment with Dr. Peters for today at 10:45. He states he has been having nose bleeds that has happened three different times now last one on Sunday 9/13/20 states he wasn't blowing nose or doing any kind of activity states it was dripping bright red blood that he didn't apply pressure he just used a tissue and it stopped in a couple minutes, Patient is on Eliquis and didn't know if it was from that or the chemo.

## 2020-09-22 NOTE — ADDENDUM NOTE
Encounter addended by: Neela Dudley RN on: 9/22/2020 3:38 PM   Actions taken: Charge Capture section accepted

## 2020-09-22 NOTE — PROGRESS NOTES
Discussed treatment plan with Dr. Peters.  Plan has 8 cycles and the patient thought he was to receive 6 cycles.  Per Dr. Peters, patient to have PET scan and follow up prior to receiving any cycles past cycle 6.

## 2020-09-22 NOTE — PROGRESS NOTES
Pt. Here at clinic for C6 Taxotere, Oxaliplatin, LCV, 5FU,   Pt. Stated he thinks this is his last chemo until he has a scan and MD visit. There are more cycles in the treatment plan but, pt. Is not scheduled for anymore chemo at this time.   Message sent to Lillie Kwong RN about pt's schedule  Kacie Tinoco RN was added to conversation and spoke with Dr. Peters  Pt. Has completed 6 cycles of chemo, pt. Will not be scheduled for another chemo cycle until he has a CT scan and follow up with Dr. Peters per Kacie Tinoco RN

## 2020-09-23 NOTE — PROGRESS NOTES
Pt here for 5fu pump dc. Denies having any complaints at this time. Pt has completed 6 cycles of chemotherapy has follow up for PET scan and MD appointment for future plan.

## 2020-10-20 NOTE — PROGRESS NOTES
HEMATOLOGY ONCOLOGY OUTPATIENT FOLLOW UP       Patient name: Tyree Orourke  : 1960  MRN: [id number]  Primary Care Physician: Glenys Brewster MD  Referring Physician: Glenys Brewster MD  Reason For Consult:     Chief Complaint   Patient presents with   • Follow-up     Malignant neoplasm of lower third of esophagus    Poorly differentiated signet ring gastric carcinoma.       HPI:   History of Present Illness:  Tyree Orourke is 60 y.o. 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  He reports symptoms of GERD  many years ago, took Tums regularly then went away; about 15 years remotely.  He unfortunately developed GI symptoms about 2 months ago.  His weight down 20 lbs in 2 months. He denied any pain or heartburn.  Over the last two months, he does report SOB easily with exertion.  He saw his PCP Dr. Brewster who ordered work-up. He has had the following imaging and work-up/biospy:.  Patient is not a smoker and quit about 20 years ago.  He works as a supervisor at [employer].    · 2020: Creatinine 0.85, ALT 15, AST 21 alk phos 100, bilirubin 0.6, albumin 3.7, calcium 9.0,  · 2020: Patient underwent upper GI endoscopy: Surgical pathology of gastric fundus biopsy shows poorly differentiated carcinoma with signet ring cell features involving cardio oxyntic mucosa.  Gastric antrum biopsy shows chronic gastritis.  Negative for H. Pylori.  · 2020: CT chest and abdomen with contrast: There is ill-defined low-density lesion posteriorly in the right lobe of the liver which could be a metastatic lesion, measuring 2 cm,.  There is a significant thickening throughout the GE junction region extending to the level of the proximal stomach, suspicious for tumor.  Soft tissue planes are ill-defined at the inferior aspect of this area due to tumor  infiltration.  There is moderate adenopathy in the retroperitoneum and periceliac region.  Evidence of PE.    · 5/14/2020: CT angiogram of the chest with contrast: Bilateral segmental pulmonary emboli without definitive evidence of right heart strain.  Small nodule along the left major fissure consistent with a intrapulmonary lymph node.  Redemonstration of lower esophageal/gastric mass with surrounding lymphadenopathy and a hypoattenuating lesion in the right hepatic lobe.      · 5/18/2020: The patient is here for his Initial Consultation  appointment with Dr. Peters from Dr. Pettit. He has had symptoms for about 2 months. The patient denies a lot of pain. The patient has lost at least 20lbs since the symptoms started. He denies any heartburn and has been taking Zantac BID to help prevent developing it. He will d/c that and start taking Pepcid. He says that he gets SOB easily with exertion. The patient denies any past medical problems except Lupus and high blood pressure. The patient stated he is supposed to fly out on a trip to [employer] tomorrow until Saturday for a golf trip. The patient is being given Eliquis samples 10mg BID for one week and then 5mg BID after that.   · 5/18/2020: Patient started Eliquis.    · 5/26/2020: PET CT scan: There is metabolic activity around the area of the GE junction with thickening of the wall of the bowel in this area.  Maximum SUV 4.6.  No areas of abnormal metabolic activity are seen within the abdomen or pelvis.  There are gallstones within the gallbladder without inflammation.  · 5/28/2020 CEA 13.3, d-dimer 3.28 high, ferritin 104.7, iron 31 low, iron saturation 9% low, TIBC 359,  · 5/29/2020: MRI abdomen with and without contrast: Hemangioma within the right lobe of the liver measuring 2.1 cm.  Stable appearance of multiple retroperitoneal lymph nodes, some of which are considered pathological based on size.  They would be suspicious for metastasis.  Nodular thickening and  enhancement of the distal esophagus and fundus of the stomach.    · 6/9/2020: Endoscopic ultrasound by Dr. Singleton.:A large GE junction mass starting at 36 cm and traversing into the stomach/cardia.  The majority of this has broken into or through the muscularis propria and there is a location where there is a loss of plane between the liver and the mass suggestive of possible invasion of the hepatic capsule making this a T4 lesion.  At least 4 lymph nodes in the celiac axis were visualized with the largest measuring 1.7 cm.  They are round and hypoechoic suggestive of underlying malignancy.  These cannot be biopsied as they were directly behind the mass.   Normal pancreatic uncinate head, neck body and tail.  Normal ampulla and common bile duct.  · 6/15/2020: WBC 7.8, hemoglobin 13.1, platelets 345, MCV 84.2, creatinine 0.83,  · 6/30/2020: Patient received cycle 1 of FLOT, WBC 6.1, hemoglobin 12.8, platelets 286, creatinine 0.86, LFTs normal, CA-19-9 628.5 high, CEA 15.1,  · 7/7/2020: WBC 3.88, hemoglobin 13, platelets 229  · 7/14/2020: Patient received cycle 2 of FLOT  · 7/27/2020 WBC 2.44, hemoglobin 12.5, platelets 191, creatinine 0.7  · 7/30/2020: Patient received a cycle 3 of FLOT.  WBC 2.7, hemoglobin 11.8, platelets 198, creatinine 0.78  · 8/13/2020: Patient underwent diagnostic laparoscopy and peritoneal washings at [facility] with Dr. Solo.  · 8/13/2020: GE junction mass biopsy: Adenocarcinoma with mucinous features and focal signet ring cells.  · 8/13/2020: Ascites fluid: No malignant cells noted.  However peritoneal washings are positive for rare malignant cells.  · 8/20/2020: Patient received cycle 4 of FLOT.  WBC 11.2, hemoglobin 12.3, platelets 280, creatinine 1, CA-19-9 207.5 high, CEA 4.4,  · 8/27/2020: WBC 5.1, hemoglobin 11.5, platelet count 166  · 9/3/2020: Patient received cycle 5 of FLOT.  WBC 2.1, hemoglobin 11.8, platelets 171, creatinine 0.8,  · 19 2020: WBC 2.56, hemoglobin 11.9,  platelets 162, ,  · 9/17/2020: Cycle 6 chemotherapy held due to neutropenia with a WBC 1.5,, ,  · 9/22/2020  cycle 6 of FLOT.  WBC 7.4, hemoglobin 11.9, platelets 236, MCV 87.2, LFTs normal,  · 10/19/2020: CT chest abdomen and pelvis with contrast: Abnormal masslike concentric thickening of the lower third of the esophagus, extending across the GE junction into the stomach consistent with malignancy.  Degree of involvement of the lower esophagus has increased.  There now appears to be invasion of the adjacent diaphragmatic crura, increased contiguous metastatic extension posterior to the pancreas and increased involvement of the gastrohepatic ligament..  Previously seen PE has resolved.  Approximately 2.4 cm low-density lesion in right hepatic lobe consistent with hemangioma.  Retroperitoneal adenopathy in the midabdomen is again noted.  The mass also appears to directly invade the left adrenal gland.      Subjective:   Patient presents for follow-up regarding esophageal cancer.  In the interim patient has lost up to 15 pounds weight over the last 5 to 6 weeks.  He had a CT scan recently and discussed those results..  He does report symptoms of regurgitation, dysphagia and cannot keep down solid foods well..  He continues to have symptoms of foot drop.  He had a follow up with Dr. Tiwari for this and is scheduled for a splint fitting. He is currently on Eliquis BID.  His appetite is okay.  Has slight fatigue.  Denies any numbness in his hands.  Wants to continue chemo.      The following portions of the patient's history were reviewed and updated as appropriate: allergies, current medications, past family history, past medical history, past social history, past surgical history and problem list.    Past Medical History:   Diagnosis Date   • Esophageal cancer (CMS/HCC)     esophageal    • GERD (gastroesophageal reflux disease)    • Hypertension    • Lupus (CMS/HCC)     skin     Past Surgical  History:   Procedure Laterality Date   • ENDOSCOPY     • REPLACEMENT TOTAL KNEE Left 04/2019   • UPPER ENDOSCOPIC ULTRASOUND W/ FNA N/A 6/9/2020    Procedure: Endoscopic ULTRASOUND;  Surgeon: Uriel Singleton MD;  Location: [facility] ENDOSCOPY;  Service: Gastroenterology;  Laterality: N/A;  Post operative diagnosis: mass, lymph nodes   • VENOUS ACCESS DEVICE (PORT) INSERTION Left 6/15/2020    Procedure: INSERTION VENOUS ACCESS DEVICE LEFT SUBCLAVIAN UNDER FLOUROSCOPIC GUIDANCE;  Surgeon: Tyree Adkins MD;  Location: [facility] MAIN OR;  Service: Cardiothoracic;  Laterality: Left;       Current Outpatient Medications:   •  apixaban (ELIQUIS) 5 MG tablet tablet, Take 1 tablet by mouth Every 12 (Twelve) Hours. (Patient taking differently: Take 5 mg by mouth Daily.), Disp: 60 tablet, Rfl: 3  •  dexamethasone (DECADRON) 4 MG tablet, Take 2 tablets oral twice a day for 3 consecutive days beginning the day before chemotherapy and continue for 6 doses., Disp: 12 tablet, Rfl: 7  •  famotidine (PEPCID) 20 MG tablet, Take 40 mg by mouth Daily., Disp: , Rfl:   •  ferrous gluconate (FERGON) 324 MG tablet, Take 1 tablet by mouth 2 (Two) Times a Day., Disp: 60 tablet, Rfl: 11  •  ferrous sulfate 325 (65 FE) MG tablet, Take 325 mg by mouth Daily., Disp: , Rfl:   •  lisinopril (PRINIVIL,ZESTRIL) 10 MG tablet, Take 10 mg by mouth Daily. Take preop, Disp: , Rfl:   •  Multiple Vitamins-Minerals (MULTIVITAMIN ADULT PO), Take  by mouth., Disp: , Rfl:   •  ondansetron (ZOFRAN) 8 MG tablet, Take 1 tablet by mouth 3 (Three) Times a Day As Needed for Nausea or Vomiting., Disp: 30 tablet, Rfl: 5    No Known Allergies    Family History   Problem Relation Age of Onset   • Leukemia Mother         CLL     Cancer-related family history is not on file.    Social History     Tobacco Use   • Smoking status: Never Smoker   • Smokeless tobacco: Never Used   Substance Use Topics   • Alcohol use: Yes     Frequency: 2-4 times a month     Comment: Socially  "  • Drug use: Never     Social History     Social History Narrative    Patient lives in [place], [place], with his wife. He has one child. He does not smoke. He works at [employer].      ROS:     Review of Systems   Constitutional: Positive for fatigue. Negative for activity change, chills and fever.   HENT: Negative for drooling, ear discharge, mouth sores, nosebleeds and sore throat.    Eyes: Negative for photophobia, pain, redness and visual disturbance.   Respiratory: Negative for cough, choking, shortness of breath and wheezing.    Cardiovascular: Negative for chest pain and palpitations.   Gastrointestinal: Negative for abdominal pain, diarrhea, nausea and vomiting.        .  Weight loss.  Reflux, regurgitation, dysphagia   Genitourinary: Negative for dysuria and urgency.   Musculoskeletal: Negative for neck stiffness.   Skin: Negative for rash.   Neurological: Negative for tremors, seizures, syncope and speech difficulty.        Reports right foot drop.   Psychiatric/Behavioral: Negative for agitation, confusion and hallucinations.     I have reviewed and confirmed the accuracy of the patient's history which includes chief complaint, subjective, HPI, ROS,as entered by the MA/LPN/RN.    Objective:    Vitals:    10/20/20 1240   BP: 101/59   Pulse: 86   Resp: 16   Temp: 99.1 °F (37.3 °C)   Weight: 69.9 kg (154 lb)   Height: 177.8 cm (70\")     Body mass index is 22.1 kg/m².  ECOG  (0) Fully active, able to carry on all predisease performance without restriction    Physical Exam:     Physical Exam   Constitutional: He is oriented to person, place, and time. He appears well-developed. No distress.   HENT:   Head: Normocephalic and atraumatic.   Eyes: Conjunctivae are normal. Right eye exhibits no discharge. Left eye exhibits no discharge. No scleral icterus.   Neck: Normal range of motion. Neck supple. No thyromegaly present.   Cardiovascular: Normal rate, regular rhythm and normal heart sounds. Exam reveals no gallop and no " friction rub.   Pulmonary/Chest: Effort normal and breath sounds normal. No stridor. No respiratory distress. He has no wheezes.   Port    Abdominal: Soft. Normal appearance and bowel sounds are normal. He exhibits no mass. There is no abdominal tenderness. There is no rebound and no guarding.   Musculoskeletal: Normal range of motion. No swelling or tenderness.   Lymphadenopathy:     He has no cervical adenopathy.   Neurological: He is alert and oriented to person, place, and time. He exhibits normal muscle tone.   Right foot drop   Skin: Skin is warm. No rash noted. He is not diaphoretic. No erythema.   Psychiatric: His behavior is normal. Mood normal.   Nursing note and vitals reviewed.    I have reviewed and confirmed the accuracy of the patient's history: Chief complaint, HPI, ROS and Subjective as entered by the MA/LPN/RN. Jackson Peters MD 10/20/20     Lab Results - Last 18 Months   Lab Units 09/22/20  0819 09/17/20  0808 09/10/20  1506   WBC 10*3/mm3 7.51 1.51* 2.56*   HEMOGLOBIN g/dL 11.9* 11.3* 11.9*   HEMATOCRIT % 34.6* 32.4* 35.1*   PLATELETS 10*3/mm3 236 163 162   MCV fL 87.2 85.0 85.6     Lab Results - Last 18 Months   Lab Units 09/22/20  0819 09/17/20  0808 09/03/20  0819   SODIUM mmol/L 138 137 134*   POTASSIUM mmol/L 4.3 4.1 4.2   CHLORIDE mmol/L 103 103 101   CO2 mmol/L 25.0 21.0* 22.0   BUN  12 12 10   CREATININE mg/dL 0.80 0.80 0.81   CALCIUM mg/dL 9.1 9.1 9.4   BILIRUBIN mg/dL 0.4 0.4 0.4   ALK PHOS U/L 83 85 91   ALT (SGPT) U/L 22 20 18   AST (SGOT) U/L 28 29 23   GLUCOSE mg/dL 170* 170* 166*     Lab Results   Component Value Date    GLUCOSE 170 (H) 09/22/2020    BUN  09/22/2020      Comment:      Testing performed by alternate method    BUN 12 09/22/2020    CREATININE 0.80 09/22/2020    EGFRIFNONA 99 09/22/2020    BCR  09/22/2020      Comment:      Testing not performed    K 4.3 09/22/2020    CO2 25.0 09/22/2020    CALCIUM 9.1 09/22/2020    ALBUMIN 3.60 09/22/2020    LABIL2 1.3 07/27/2020    AST  28 09/22/2020    ALT 22 09/22/2020       Lab Results - Last 18 Months   Lab Units 08/13/20  0623 07/27/20  1125   INR INR 1.2 1.0   APTT s  --  31.5     Lab Results   Component Value Date    IRON 31 (L) 05/28/2020    TIBC 359 05/28/2020    FERRITIN 104.70 05/28/2020     No results found for: FOLATE    No results found for: OCCULTBLD    No results found for: RETICCTPCT    No results found for: SAUQOGTV02  No results found for: SPEP, UPEP  No results found for: LDH, URICACID  No results found for: ALFREDA, RF, SEDRATE  No results found for: FIBRINOGEN, HAPTOGLOBIN  Lab Results   Component Value Date    PTT 31.5 07/27/2020    INR 1.2 08/13/2020     No results found for:   Lab Results   Component Value Date    CEA 4.44 08/20/2020     No components found for: CA-19-9  No results found for: PSA    Assessment/Plan     Assessment:  1. 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  He does have retroperitoneal lymph node involvement as well as celiac lymph node metastases.  Mass causing liver capsule invasion.  Patient presented with dysphagia and had 30 pound weight loss.  Started palliative chemotherapy with  FLOT.  Patient did experience neuropathy from the chemotherapy..  Patient had laparoscopy and peritoneal lavage that showed some malignant cells.  Patient finished her 6 cycles as of 9/22/2020.  Unfortunately restaging CT scan on 10/19/2020 shows evidence of disease progression.  2. Regurgitation/mild dysphagia: Due to symptoms of tumor obstructing the upper GI tract.  3. Weight loss  4. Pulmonary embolism: Started on Eliquis .  Thrombosis very likely secondary to malignancy.   5. Myelosuppression: Cycle 6 was delayed on 9/17/2020 due to myelosuppression.  6. Anemia/leukopenia: Due to myelosuppression from chemotherapy  7. Iron deficiency  8. Right foot drop: Unclear if this is due to chemotherapy.  Does not have neuropathy in  other extremities.  Patient saw orthopedics.  They have recommended a splint.      Plan:  1. Unfortunately disease is progressing with the current chemotherapy.  At this point I do not feel he is a candidate for surgical resection.  He is also losing weight.  Discussed about switching therapy.  2. Will order NGS for multiplex genomic studies on the tumor specimen.  Hoping to use targeted therapies to minimize toxicities, such as HER-2 inhibitor or PD-L1 inhibitors etc.  Explained the process involved.  3. Discussed with radiation oncologist Dr. Cee.  Patient seems to be having upper GI obstructive symptoms due to tumor invasion.  Patient is also at risk of having pain symptoms due to tumor invading towards pancreas, diaphragm, left adrenal etc.    4. Continue Eliquis.  5. Foot drop:  Continue ankle splint.  6. Continue Zofran PRN.,  Continue multivitamins, continue iron supplements  7. We will continue to monitor CEA and CA-19-9  8. Follow-up in 4 weeks pain        I have reviewed and confirmed the accuracy of the patient's history: Chief complaint, HPI, ROS and Subjective as entered by the MA/LPN/RN. Jackson Peters MD 10/20/20       I have spent greater than 40 minutes in face-to-face time with the patient and 95% of the time was spent in counseling and coordination of care, including review of imaging, pathology, indications for treatment, goals of therapy, alternatives and risks,         Electronically signed by Jackson Peters MD, 10/20/20, 4:56 PM EDT.

## 2020-10-21 NOTE — TELEPHONE ENCOUNTER
Tyree has insurance paperwork here that is ready to be picked up. I called patient to let him know. He has an upcoming appointment with Dr. Cee tomorrow and states he will pick it up during his appointment with him on 10/22/2020. I will hold on to it until then-Miryam

## 2020-10-22 NOTE — PROGRESS NOTES
"FOLLOW-UP NOTE    Name: Tyree Orourke  YOB: 1960  MRN #: [id number]  Date of Service: 10/22/2020  Referring Provider:  Jackson Peters MD  Primary Care Provider: Glenys Brewster MD    DIAGNOSIS: Poorly differentiated signet ring gastric carcinoma    REASON FOR VISIT: \"Progressing Gastric cancer on FLOT\", obstructive symptoms and pain.    RADIATION TREATMENT COURSE: None    HISTORY OF PRESENT ILLNESS: The patient is a 60 y.o. year old male who until his diagnosis was a very healthy and active 60 year old gentleman. He has remained a very active golfer despite the diagnosis.  His symptoms were progressive dysphagia and he was found on endoscopy to have a cardia centered poorly differentiated gastric cancer with GEJ involvement, adenocarcinoma.  On staging, she was found to be a T4 lesion and bulky adenopathy.  The initial 2 cycles of the FLOT chemo did help his dysphagia.  He went for diagnostic laparoscopy with Dr. Almodovar who noted on peritoneal washing to be positive for malignant cells.    He was recommended to continue FLOT and has had 6 cycles.    He has numbness and tingling in the right foot and has seen Ortho but no other neuro symptoms.  Denies other neuropathy, denies any in the hands    Dr. Peters ordered restaging imaging on 10/19/2020 which unfortunately showed progression and he has developed obstructive symptoms:    - Increased size of the gastric mass with increased involvement of the lower thoracic esophagus.  There now appears to be invasion of the adjacent diaphragmatic crura, increased contiguous metastatic extension posteriorly to the pancreas, increased involvement of the gastrohepatic ligament.  Small intrapleural lymph node within the left major fissure, new 3 mm non-calcified nodule within the right upper lobe. PE's have nearly resolved.  There is an approximately 2.4 cm low-density lesion in the liver--hemangioma on prior MRI abdomen.  No new liver lesions identified.  The " gastric mass infiltrates the left upper quadrant retroperitoneum appears to partially encase the left adrenal gland, new since the prior study (symptomatic).  Retroperitoneal adenopathy in the mid abdomen is again noted, including a 10 mm left para-aortic, 10 mm anterior caval nodes.    From 9/2020 to 10/20/2020 he lost 16 lbs.  He has increasing symptoms of regurgitation, dysphagia, left flank pain and the symptom of right foot drop--splint fitting with Dr. Tiwari.     Dr. Peters is going to send NGS given the local failure and potential 3 mm Lung metastasis that is new and asking about palliative XRT.    The following portions of the patient's history were reviewed and updated as appropriate: allergies, current medications, past family history, past medical history, past social history, past surgical history and problem list. Reviewed with the patient and remain unchanged.    PAST MEDICAL HISTORY:  he  has a past medical history of Esophageal cancer (CMS/HCC), GERD (gastroesophageal reflux disease), Hypertension, and Lupus (CMS/HCC).  MEDICATIONS:   Current Outpatient Medications:   •  apixaban (ELIQUIS) 5 MG tablet tablet, Take 1 tablet by mouth Every 12 (Twelve) Hours. (Patient taking differently: Take 5 mg by mouth Daily.), Disp: 60 tablet, Rfl: 3  •  famotidine (PEPCID) 20 MG tablet, Take 40 mg by mouth Daily., Disp: , Rfl:   •  ferrous gluconate (FERGON) 324 MG tablet, Take 1 tablet by mouth 2 (Two) Times a Day., Disp: 60 tablet, Rfl: 11  •  ferrous sulfate 325 (65 FE) MG tablet, Take 325 mg by mouth Daily., Disp: , Rfl:   •  lisinopril (PRINIVIL,ZESTRIL) 10 MG tablet, Take 10 mg by mouth Daily. Take preop, Disp: , Rfl:   •  Multiple Vitamins-Minerals (MULTIVITAMIN ADULT PO), Take  by mouth., Disp: , Rfl:   •  ondansetron (ZOFRAN) 8 MG tablet, Take 1 tablet by mouth 3 (Three) Times a Day As Needed for Nausea or Vomiting., Disp: 30 tablet, Rfl: 5  •  dexamethasone (DECADRON) 4 MG tablet, Take 2 tablets  "oral twice a day for 3 consecutive days beginning the day before chemotherapy and continue for 6 doses., Disp: 12 tablet, Rfl: 7  ALLERGIES: No Known Allergies  PAST SURGICAL HISTORY: he has a past surgical history that includes Replacement total knee (Left, 04/2019); Esophagogastroduodenoscopy; Upper endoscopic ultrasound w/ FNA (N/A, 6/9/2020); and Venous Access Device (Port) (Left, 6/15/2020).  PREVIOUS RADIOTHERAPY OR CHEMOTHERAPY: Chemo.  FAMILY HISTORY: his family history includes Leukemia in his mother.  SOCIAL HISTORY: he  reports that he has never smoked. He has never used smokeless tobacco. He reports current alcohol use. He reports that he does not use drugs.  PAIN AND PAIN MANAGEMENT: No pain  Vitals:    10/22/20 1404   BP: 112/75   Pulse: 80   Temp: 98.9 °F (37.2 °C)   TempSrc: Infrared   SpO2: 99%   Weight: 71 kg (156 lb 9.6 oz)   Height: 177.8 cm (70\")   PainSc: 0-No pain     NUTRITIONAL STATUS:    no issues  KPS: 70        Review of Systems:   As noted above.    Otherwise negative as below.     General: + weight change Skin: No rashes or jaundice.  HEENT: No change in vision or hearing, no headaches.  Neck: No dysphagia or masses.  Heme/Lymph: No easy bruising or bleeding.  Respiratory System: No shortness of breath or cough.  Cardiovascular: No chest pain, palpitations, or dyspnea on exertion.  - Pacemaker. GI: As above.  : No dysuria or hematuria.  Endocrine: No heat or cold intolerance. Musculoskeletal: No myalgias or arthralgias.  Neuro: No weakness, numbness, syncope, or seizures. + foot drop Psych: No mood changes or depression. Ext: Denies swelling.        Objective     Vitals:  Vitals:    10/22/20 1404   BP: 112/75   Pulse: 80   Temp: 98.9 °F (37.2 °C)   TempSrc: Infrared   SpO2: 99%   Weight: 71 kg (156 lb 9.6 oz)   Height: 177.8 cm (70\")   PainSc: 0-No pain       PHYSICAL EXAM:  GENERAL: in no apparent distress, sitting comfortably in room.    HEENT: normocephalic, atraumatic. Pupils are " equal, round, reactive to light. Sclera anicteric. Conjunctiva not injected. Oropharynx without erythema, ulcerations or thrush.   NECK: Supple with no masses.  LYMPHATIC: no cervical, supraclavicular or axillary adenopathy appreciated bilaterally.   CARDIOVASCULAR: S1 & S2 detected; no murmurs, rubs or gallops.  CHEST: clear to auscultation bilaterally; no wheezes, crackles or rubs. Work of breathing normal.  ABDOMEN: bowel sounds present. Abdomen is soft, nontender, nondistended.   MUSCULOSKELETAL: no tenderness to palpation along the spine or scapulae. Normal range of motion.  EXTREMITIES: no clubbing, cyanosis, edema.  SKIN: no erythema, rashes, ulcerations noted.   NEUROLOGIC: cranial nerves II-XII grossly intact bilaterally. No focal neurologic deficits.  PSYCHIATRIC:  alert, aware, and appropriate.      PERTINENT IMAGING/PATHOLOGY/LABS (Medical Decision Making):     COORDINATION OF CARE: A copy of this note is sent to the referring provider.    PATHOLOGY (Reviewed): Peritoneal washing path from UL reviewed.    IMAGING (Reviewed): Independent review of interval imaging and comparison to PET/CT.  No definitive therapy candidate by surgery or XRT.  NGS testing ordered by Dr. Peters.     LABS (Reviewed):  Hematology WBC   Date Value Ref Range Status   09/22/2020 7.51 3.40 - 10.80 10*3/mm3 Final   07/27/2020 2.44 (L) 4.5 - 11.0 10*3/uL Final     RBC   Date Value Ref Range Status   09/22/2020 3.97 (L) 4.14 - 5.80 10*6/mm3 Final   07/27/2020 4.49 (L) 4.5 - 5.9 10*6/uL Final     Hemoglobin   Date Value Ref Range Status   09/22/2020 11.9 (L) 13.0 - 17.7 g/dL Final   07/27/2020 12.5 (L) 13.5 - 17.5 g/dL Final     Hematocrit   Date Value Ref Range Status   09/22/2020 34.6 (L) 37.5 - 51.0 % Final   07/27/2020 37.4 (L) 41.0 - 53.0 % Final     Platelets   Date Value Ref Range Status   09/22/2020 236 140 - 450 10*3/mm3 Final   07/27/2020 191 140 - 440 10*3/uL Final      Chemistry   Lab Results   Component Value Date     GLUCOSE 170 (H) 09/22/2020    BUN  09/22/2020      Comment:      Testing performed by alternate method    BUN 12 09/22/2020    CREATININE 0.80 09/22/2020    EGFRIFNONA 99 09/22/2020    BCR  09/22/2020      Comment:      Testing not performed    K 4.3 09/22/2020    CO2 25.0 09/22/2020    CALCIUM 9.1 09/22/2020    ALBUMIN 3.60 09/22/2020    LABIL2 1.3 07/27/2020    AST 28 09/22/2020    ALT 22 09/22/2020         Assessment/Plan     ASSESSMENT AND PLAN:    1. Malignant neoplasm of esophagus, unspecified location (CMS/HCC)       T4N2M poorly differentiated signet ring carcinoma of likely gastric origin.  -Failed FLOT x 6  -NGS symptoms ordered  -New scans also appears to show new small lung met as noted.  -Symptoms of 16 lbs six week weight loss, obstructive symptoms--accepting for palliative XRT to help with his left flank pain, regurgitation and mild dysphagia.    -The target of local extension of tumor towards the pancreas, left adrenal and diaphragm was discussed.  -Treatment will be 3D conformal with multi-beams and require IGRT.    Patient is going for golfing trip on Friday the week of 10/27/2020 start.  This assessment comes from my review of the imaging, pathology, physician notes and other pertinent information as mentioned.    DISPOSITION: CT sim ordered for first available.      TIME SPENT WITH PATIENT:   I spent greater than 35 minutes in face-to-face time with the patient and greater than 65% of those minutes were spent in counseling and coordination of care, including review of imaging and pathology; indications, goals, logistics, alternatives and risks - both common and rare - for my recommendations as well as surveillance and potential outcomes.    CC: MD Glenys Olvera MD John A Cox, MD  10/22/2020  2:23 PM EDT

## 2020-10-27 PROBLEM — C16.9 GASTRIC CARCINOMA (HCC): Status: ACTIVE | Noted: 2020-01-01

## 2020-11-05 NOTE — TELEPHONE ENCOUNTER
I received a fax from [facility] stating that a prior authorization may be required for requested testing. I called and spoke to Lara with [facility] billing team at [phone] and Lara stated that there will need to be a prior authorization per patients insurance for foundation testing. I called [facility] and [employer] at [phone] and left a message with my name and call back number requesting a call back regarding a patient needing a prior auth for testing.

## 2020-11-06 NOTE — TELEPHONE ENCOUNTER
I made a second attempt to reach someone at [facility] to address the requirement for a prior-auth on ordered foundation one testing. I was unsuccessful at reaching a representative at this time.

## 2020-11-17 PROBLEM — E86.0 DEHYDRATION: Status: ACTIVE | Noted: 2020-01-01

## 2020-11-17 NOTE — PROGRESS NOTES
HEMATOLOGY ONCOLOGY OUTPATIENT FOLLOW UP       Patient name: Tyree Orourke  : 1960  MRN: [id number]  Primary Care Physician: Glenys Brewster MD  Referring Physician: Glneys Brewster MD  Reason For Consult:     Chief Complaint   Patient presents with   • Follow-up     Malignant neoplasm of esophagus, unspecified location    Poorly differentiated signet ring gastric carcinoma.       HPI:   History of Present Illness:  Tyree Orourke is 60 y.o. 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  He reports symptoms of GERD  many years ago, took Tums regularly then went away; about 15 years remotely.  He unfortunately developed GI symptoms about 2 months ago.  His weight down 20 lbs in 2 months. He denied any pain or heartburn.  Over the last two months, he does report SOB easily with exertion.  He saw his PCP Dr. Brewster who ordered work-up. He has had the following imaging and work-up/biospy:.  Patient is not a smoker and quit about 20 years ago.  He works as a supervisor at [employer].    · 2020: Creatinine 0.85, ALT 15, AST 21 alk phos 100, bilirubin 0.6, albumin 3.7, calcium 9.0,  · 2020: Patient underwent upper GI endoscopy: Surgical pathology of gastric fundus biopsy shows poorly differentiated carcinoma with signet ring cell features involving cardio oxyntic mucosa.  Gastric antrum biopsy shows chronic gastritis.  Negative for H. Pylori.  · 2020: CT chest and abdomen with contrast: There is ill-defined low-density lesion posteriorly in the right lobe of the liver which could be a metastatic lesion, measuring 2 cm,.  There is a significant thickening throughout the GE junction region extending to the level of the proximal stomach, suspicious for tumor.  Soft tissue planes are ill-defined at the inferior aspect of this area due to tumor  infiltration.  There is moderate adenopathy in the retroperitoneum and periceliac region.  Evidence of PE.    · 5/14/2020: CT angiogram of the chest with contrast: Bilateral segmental pulmonary emboli without definitive evidence of right heart strain.  Small nodule along the left major fissure consistent with a intrapulmonary lymph node.  Redemonstration of lower esophageal/gastric mass with surrounding lymphadenopathy and a hypoattenuating lesion in the right hepatic lobe.      · 5/18/2020: The patient is here for his Initial Consultation  appointment with Dr. Peters from Dr. Pettit. He has had symptoms for about 2 months. The patient denies a lot of pain. The patient has lost at least 20lbs since the symptoms started. He denies any heartburn and has been taking Zantac BID to help prevent developing it. He will d/c that and start taking Pepcid. He says that he gets SOB easily with exertion. The patient denies any past medical problems except Lupus and high blood pressure. The patient stated he is supposed to fly out on a trip to [place] tomorrow until Saturday for a golf trip. The patient is being given Eliquis samples 10mg BID for one week and then 5mg BID after that.   · 5/18/2020: Patient started Eliquis.    · 5/26/2020: PET CT scan: There is metabolic activity around the area of the GE junction with thickening of the wall of the bowel in this area.  Maximum SUV 4.6.  No areas of abnormal metabolic activity are seen within the abdomen or pelvis.  There are gallstones within the gallbladder without inflammation.  · 5/28/2020 CEA 13.3, d-dimer 3.28 high, ferritin 104.7, iron 31 low, iron saturation 9% low, TIBC 359,  · 5/29/2020: MRI abdomen with and without contrast: Hemangioma within the right lobe of the liver measuring 2.1 cm.  Stable appearance of multiple retroperitoneal lymph nodes, some of which are considered pathological based on size.  They would be suspicious for metastasis.  Nodular thickening and  enhancement of the distal esophagus and fundus of the stomach.    · 6/9/2020: Endoscopic ultrasound by Dr. Singleton.:A large GE junction mass starting at 36 cm and traversing into the stomach/cardia.  The majority of this has broken into or through the muscularis propria and there is a location where there is a loss of plane between the liver and the mass suggestive of possible invasion of the hepatic capsule making this a T4 lesion.  At least 4 lymph nodes in the celiac axis were visualized with the largest measuring 1.7 cm.  They are round and hypoechoic suggestive of underlying malignancy.  These cannot be biopsied as they were directly behind the mass.   Normal pancreatic uncinate head, neck body and tail.  Normal ampulla and common bile duct.  · 6/15/2020: WBC 7.8, hemoglobin 13.1, platelets 345, MCV 84.2, creatinine 0.83,  · 6/30/2020: Patient received cycle 1 of FLOT, WBC 6.1, hemoglobin 12.8, platelets 286, creatinine 0.86, LFTs normal, CA-19-9 628.5 high, CEA 15.1,  · 7/7/2020: WBC 3.88, hemoglobin 13, platelets 229  · 7/14/2020: Patient received cycle 2 of FLOT  · 7/27/2020 WBC 2.44, hemoglobin 12.5, platelets 191, creatinine 0.7  · 7/30/2020: Patient received a cycle 3 of FLOT.  WBC 2.7, hemoglobin 11.8, platelets 198, creatinine 0.78  · 8/13/2020: Patient underwent diagnostic laparoscopy and peritoneal washings at [facility] with Dr. Solo.  · 8/13/2020: GE junction mass biopsy: Adenocarcinoma with mucinous features and focal signet ring cells.  · 8/13/2020: Ascites fluid: No malignant cells noted.  However peritoneal washings are positive for rare malignant cells.  · 8/20/2020: Patient received cycle 4 of FLOT.  WBC 11.2, hemoglobin 12.3, platelets 280, creatinine 1, CA-19-9 207.5 high, CEA 4.4,  · 8/27/2020: WBC 5.1, hemoglobin 11.5, platelet count 166  · 9/3/2020: Patient received cycle 5 of FLOT.  WBC 2.1, hemoglobin 11.8, platelets 171, creatinine 0.8,  · 19 2020: WBC 2.56, hemoglobin 11.9,  platelets 162, ,  · 9/17/2020: Cycle 6 chemotherapy held due to neutropenia with a WBC 1.5,, ,  · 9/22/2020  cycle 6 of FLOT.  WBC 7.4, hemoglobin 11.9, platelets 236, MCV 87.2, LFTs normal,  · 10/19/2020: CT chest abdomen and pelvis with contrast: Abnormal masslike concentric thickening of the lower third of the esophagus, extending across the GE junction into the stomach consistent with malignancy.  Degree of involvement of the lower esophagus has increased.  There now appears to be invasion of the adjacent diaphragmatic crura, increased contiguous metastatic extension posterior to the pancreas and increased involvement of the gastrohepatic ligament..  Previously seen PE has resolved.  Approximately 2.4 cm low-density lesion in right hepatic lobe consistent with hemangioma.  Retroperitoneal adenopathy in the midabdomen is again noted.  The mass also appears to directly invade the left adrenal gland.  ·   Treatment Site Ref. ID Energy Dose/Fx (cGy) #Fx Dose Correction (cGy) Total Dose (cGy) Start Date End Date Elapsed Days   Abdomen Abdomen 10X/18X 250 14 / 14 0 3,500 10/27/2020 11/17/2020 21         Subjective:   Patient presents for follow-up regarding esophageal cancer.  In the interim patient has significantly lost weight.  He had received radiation therapy of 14 treatments to the GE junction mass.  Hiccups have resolved.  But he still has some vague pain that radiates to the back.  He takes Tylenol for that.  He has not required any narcotics.  He would like to pursue palliative chemotherapy at this point.  Family is also considering a feeding tube since he is losing weight.   He continues to have symptoms of foot drop.  He is currently on Eliquis BID.  His appetite is okay.  Has slight fatigue.  Denies any numbness in his hands.  Wants to do palliative chemotherapy.      The following portions of the patient's history were reviewed and updated as appropriate: allergies, current medications, past  family history, past medical history, past social history, past surgical history and problem list.    Past Medical History:   Diagnosis Date   • Esophageal cancer (CMS/HCC)     esophageal    • GERD (gastroesophageal reflux disease)    • Hypertension    • Lupus (CMS/HCC)     skin     Past Surgical History:   Procedure Laterality Date   • ENDOSCOPY     • REPLACEMENT TOTAL KNEE Left 04/2019   • UPPER ENDOSCOPIC ULTRASOUND W/ FNA N/A 6/9/2020    Procedure: Endoscopic ULTRASOUND;  Surgeon: Uriel Singleton MD;  Location: [facility] ENDOSCOPY;  Service: Gastroenterology;  Laterality: N/A;  Post operative diagnosis: mass, lymph nodes   • VENOUS ACCESS DEVICE (PORT) INSERTION Left 6/15/2020    Procedure: INSERTION VENOUS ACCESS DEVICE LEFT SUBCLAVIAN UNDER FLOUROSCOPIC GUIDANCE;  Surgeon: Tyree Adkins MD;  Location: [facility] MAIN OR;  Service: Cardiothoracic;  Laterality: Left;       Current Outpatient Medications:   •  apixaban (ELIQUIS) 5 MG tablet tablet, Take 1 tablet by mouth Every 12 (Twelve) Hours. (Patient taking differently: Take 5 mg by mouth Daily.), Disp: 60 tablet, Rfl: 3  •  dexamethasone (DECADRON) 4 MG tablet, Take 2 tablets oral twice a day for 3 consecutive days beginning the day before chemotherapy and continue for 6 doses., Disp: 12 tablet, Rfl: 7  •  famotidine (PEPCID) 20 MG tablet, Take 40 mg by mouth Daily., Disp: , Rfl:   •  ferrous gluconate (FERGON) 324 MG tablet, Take 1 tablet by mouth 2 (Two) Times a Day., Disp: 60 tablet, Rfl: 11  •  ferrous sulfate 325 (65 FE) MG tablet, Take 325 mg by mouth Daily., Disp: , Rfl:   •  lisinopril (PRINIVIL,ZESTRIL) 10 MG tablet, Take 10 mg by mouth Daily. Take preop, Disp: , Rfl:   •  LORazepam (Ativan) 0.5 MG tablet, Take 1 tablet by mouth Every 6 (Six) Hours As Needed (Chemotherapy-induced nausea and vomiting)., Disp: 20 tablet, Rfl: 0  •  Multiple Vitamins-Minerals (MULTIVITAMIN ADULT PO), Take  by mouth., Disp: , Rfl:   •  ondansetron (ZOFRAN) 8 MG tablet,  "Take 1 tablet by mouth 3 (Three) Times a Day As Needed for Nausea or Vomiting., Disp: 30 tablet, Rfl: 5    No Known Allergies    Family History   Problem Relation Age of Onset   • Leukemia Mother         CLL     Cancer-related family history is not on file.    Social History     Tobacco Use   • Smoking status: Never Smoker   • Smokeless tobacco: Never Used   Substance Use Topics   • Alcohol use: Yes     Frequency: 2-4 times a month     Comment: Socially   • Drug use: Never     Social History     Social History Narrative    Patient lives in [place], [place], with his wife. He has one child. He does not smoke. He works at [employer].      ROS:     Review of Systems   Constitutional: Positive for fatigue. Negative for activity change, chills and fever.   HENT: Negative for drooling, ear discharge, mouth sores, nosebleeds and sore throat.    Eyes: Negative for photophobia, pain, redness and visual disturbance.   Respiratory: Negative for cough, choking, shortness of breath and wheezing.    Cardiovascular: Negative for chest pain and palpitations.   Gastrointestinal: Negative for abdominal pain, diarrhea, nausea and vomiting.        .  Weight loss.  Reflux, regurgitation, dysphagia   Genitourinary: Negative for difficulty urinating, dysuria and urgency.   Musculoskeletal: Negative for neck stiffness.   Skin: Negative for rash.   Neurological: Negative for tremors, seizures, syncope and speech difficulty.        Reports right foot drop.   Psychiatric/Behavioral: Negative for agitation, confusion and hallucinations.     I have reviewed and confirmed the accuracy of the patient's history which includes chief complaint, subjective, HPI, ROS,as entered by the MA/LPN/RN.    Objective:    Vitals:    11/24/20 1203   BP: 127/97   Pulse: 107   Resp: 18   Temp: 98.1 °F (36.7 °C)   Weight: 62.1 kg (137 lb)   Height: 177.8 cm (70\")   PainSc: 0-No pain     Body mass index is 19.66 kg/m².  ECOG  (0) Fully active, able to carry on all predisease " performance without restriction    Physical Exam:     Physical Exam   Constitutional: He is oriented to person, place, and time. He appears well-developed. He appears ill. No distress.   HENT:   Head: Normocephalic and atraumatic.   Eyes: Conjunctivae are normal. Right eye exhibits no discharge. Left eye exhibits no discharge. No scleral icterus.   Neck: Normal range of motion. Neck supple. No thyromegaly present.   Cardiovascular: Normal rate, regular rhythm and normal heart sounds. Exam reveals no gallop and no friction rub.   Pulmonary/Chest: Effort normal and breath sounds normal. No stridor. No respiratory distress. He has no wheezes.   Port    Abdominal: Soft. Normal appearance and bowel sounds are normal. He exhibits no mass. There is no abdominal tenderness. There is no rebound and no guarding.   Appears malnourished   Musculoskeletal: Normal range of motion. No swelling or tenderness.   Lymphadenopathy:     He has no cervical adenopathy.   Neurological: He is alert and oriented to person, place, and time. He exhibits normal muscle tone.   Right foot drop   Skin: Skin is warm. No rash noted. He is not diaphoretic. No erythema.   Psychiatric: His behavior is normal. Mood and thought content normal.   Nursing note and vitals reviewed.    I have reviewed and confirmed the accuracy of the patient's history: Chief complaint, HPI, ROS and Subjective as entered by the MA/MAICOLN/RN. Jackson Peters MD 11/24/20     Lab Results - Last 18 Months   Lab Units 09/22/20  0819 09/17/20  0808 09/10/20  1506   WBC 10*3/mm3 7.51 1.51* 2.56*   HEMOGLOBIN g/dL 11.9* 11.3* 11.9*   HEMATOCRIT % 34.6* 32.4* 35.1*   PLATELETS 10*3/mm3 236 163 162   MCV fL 87.2 85.0 85.6     Lab Results - Last 18 Months   Lab Units 09/22/20  0819 09/17/20  0808 09/03/20  0819   SODIUM mmol/L 138 137 134*   POTASSIUM mmol/L 4.3 4.1 4.2   CHLORIDE mmol/L 103 103 101   CO2 mmol/L 25.0 21.0* 22.0   BUN  12 12 10   CREATININE mg/dL 0.80 0.80 0.81   CALCIUM  mg/dL 9.1 9.1 9.4   BILIRUBIN mg/dL 0.4 0.4 0.4   ALK PHOS U/L 83 85 91   ALT (SGPT) U/L 22 20 18   AST (SGOT) U/L 28 29 23   GLUCOSE mg/dL 170* 170* 166*     Lab Results   Component Value Date    GLUCOSE 170 (H) 09/22/2020    BUN  09/22/2020      Comment:      Testing performed by alternate method    BUN 12 09/22/2020    CREATININE 0.80 09/22/2020    EGFRIFNONA 99 09/22/2020    BCR  09/22/2020      Comment:      Testing not performed    K 4.3 09/22/2020    CO2 25.0 09/22/2020    CALCIUM 9.1 09/22/2020    ALBUMIN 3.60 09/22/2020    LABIL2 1.3 07/27/2020    AST 28 09/22/2020    ALT 22 09/22/2020       Lab Results - Last 18 Months   Lab Units 08/13/20  0623 07/27/20  1125   INR INR 1.2 1.0   APTT s  --  31.5     Lab Results   Component Value Date    IRON 31 (L) 05/28/2020    TIBC 359 05/28/2020    FERRITIN 104.70 05/28/2020     No results found for: FOLATE    No results found for: OCCULTBLD    No results found for: RETICCTPCT    No results found for: OJXVQGEE85  No results found for: SPEP, UPEP  No results found for: LDH, URICACID  No results found for: ALFREDA, RF, SEDRATE  No results found for: FIBRINOGEN, HAPTOGLOBIN  Lab Results   Component Value Date    PTT 31.5 07/27/2020    INR 1.2 08/13/2020     No results found for:   Lab Results   Component Value Date    CEA 4.44 08/20/2020     No components found for: CA-19-9  No results found for: PSA    Assessment/Plan     Assessment:  1. 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  He does have retroperitoneal lymph node involvement as well as celiac lymph node metastases.  Mass causing liver capsule invasion.  Patient presented with dysphagia and had 30 pound weight loss.  Started palliative chemotherapy with  FLOT.  Patient did experience neuropathy from the chemotherapy..  Patient had laparoscopy and peritoneal lavage that showed some malignant cells.  Patient finished her 6  cycles as of 9/22/2020.  Unfortunately restaging CT scan on 10/19/2020 shows evidence of disease progression.  Patient started having upper GI obstructive symptoms due to tumor invasion, and also was having pain symptoms due to tumor invading towards pancreas, diaphragm, left adrenal etc. Patient received palliative radiation therapy due to significant tumor mass causing local symptoms.  2. Regurgitation/mild dysphagia: Due to symptoms of tumor obstructing the upper GI tract.  3. Weight loss: Due to cancer related cachexia/anorexia.  Also early satiety  4. Pulmonary embolism:  on Eliquis .  Thrombosis very likely secondary to malignancy.   5. Anemia/leukopenia: Due to myelosuppression from chemotherapy  6. Iron deficiency  7. Right foot drop: Unclear if this is due to chemotherapy.  Does not have neuropathy in other extremities.  Patient saw orthopedics.  They have recommended a splint.      Plan:      1. Discussed with Dr. Solo.  It is okay to proceed with the feeding tube at this point, no contraindication since patient is not going to be planned for any definitive surgery.  2. Discussed chemotherapy options which would include Taxol plus ramucirumab or FOLFIRI.  Unfortunately his tumor is not expressing any molecular markers that would allow targeted therapy.  3. Discussed with radiation oncologist Dr. Cee.  Patient seems to be having upper GI obstructive symptoms due to tumor invasion.  Patient is also at risk of having pain symptoms due to tumor invading towards pancreas, diaphragm, left adrenal etc.  4. Continue Eliquis.  5. Prescribed Reglan before meals  6. Foot drop:  Continue ankle splint.  7. We will continue to monitor CEA and CA-19-9  8. Follow-up in about 4 weeks      I have reviewed and confirmed the accuracy of the patient's history: Chief complaint, HPI, ROS and Subjective as entered by the MA/LPN/RN. Jackson Peters MD 11/24/20          Electronically signed by Jackson Peters MD, 11/24/20, 12:44 PM  EST.  .

## 2020-11-18 NOTE — PROGRESS NOTES
"                 Nutrition Assessment  Tyree Orourke    Anthropometrics  Height: 5'10\"  Weight: 144.6#  BMI: 20.7  Weight Hx:   (5/18/20) 197#  (5/26/20) 188.4#  (6/9/20) 180.9#  (6/15/20) 180.7#  (6/30/20) 176.3#  (7/1/20) 176.3#  (7/7/20) 171#  (7/14/20) 171.9#  (8/20/20) 169.9#  (9/17/20) 169.9#  (9/22/20) 168.4#  (10/20/20) 154.1#  (11/17/20) 144.6#  IBW: 166# (87.1%)  UBW: 210# (68.8%)    24-Hour Diet Recall:  Breakfast - hohos, ding dongs, water  Dinner - 2 bowls of cheerios with whole milk  Snacks - yogurt, pudding    Discussion: Met with the pt during infusion today to assess his current problems while eating. Pt said he's not able to eat a significant amount of food due to n/v shortly after taking a few bites. Pt said he's vomited a few times due to eating, and since then he now stops himself as soon as he starts to feel nauseous which has contributed to his significant weight loss in the past two months.     Pt is currently taking zofran 2-3x/day at least an hour before meals. Pt also takes pepcid before breakfast daily. I discussed the pt with NATHAN Castellon, she prescribed Ativan to help n/v. I informed pt to take this every 8 hours as needed, pt verbalized understanding. If the Ativan is not helpful, Nadja and I agreed the pt likely needs a GI consult.    The pt said certain foods are more problematic than others, I encouraged him to have more of the foods he's able to tolerate. We reviewed soft foods again and I had him identify the foods he knows he can have that don't cause n/v, I encouraged him to have the foods we discussed and to review the food list with his wife, pt said he will. I offered to call his wife to discuss his diet needs, pt said it wasn't necessary.     Pt informed me if he can't improve his eating he is open to a feeding tube to prevent further weight loss.     Pt was pleasant as usual and appreciated today's visit. All questions and concerns were addressed and answered. I " provided my contact information and encouraged him to call or schedule an appointment as needed.      Karlie Langford, MS,RD,LD-[place],CD-[place]  Registered Dietitian

## 2020-11-19 NOTE — PROGRESS NOTES
Patient saw Dr. Cee yesterday and was brought in for IVFs today for dehydration. He has been struggling with eating and drinking due to nausea/vomiting and has continued to lose weight. He has been taking his zofran as often as he can to keep his nausea under control. He is not currently nauseated. He states when he starts eating he stops himself after a few bites because he feels like he will throw up if he keeps eating. Dietician came and spoke with patient to go over some ideas on increasing his calories. Prescription sent in for ativan and explained that this can also help with the type of nausea he is having.

## 2020-11-23 NOTE — TELEPHONE ENCOUNTER
I spoke to the patient and he stated the ativan is not really helping. Patient still having N.V. I informed him that GI consult was likely the next step. He verbalized understanding.

## 2020-11-23 NOTE — TELEPHONE ENCOUNTER
----- Message from NATHAN Urbina sent at 11/23/2020  3:43 PM EST -----  Regarding: RE: GI Consult  Ingrid,     Can you please call pt and see if Ativan helped him?  Please advise.     Electronically signed by NATHAN Urbina, 11/23/20, 3:44 PM EST.    ----- Message -----  From: Karlie Langford RD  Sent: 11/18/2020   5:13 PM EST  To: Jackson Peters MD, Miryam Hopper MA  Subject: GI Consult                                       Pt might benefit from GI consult. Pt struggling to keep food down and has n/v shortly after taking a few bites of food. He's lost over 20# in the past two months. Nadja prescribed him Ativan, if this doesn't help, I think a GI consult is necessary.    Thank you!    Karlie Langford, MS,RD

## 2020-12-04 NOTE — PROGRESS NOTES
Education for Administration of Chemotherapy and/or Biotherapy     NAME: Tyree Orourke  : 1960  MRN: [id number]  DATE OF SERVICE: 2020  REASON FOR VISIT: PATIENT EDUCATION    Mr. Tyree Orourke is here today for education on his upcoming chemotherapy and/or biotherapy recommended for treatment of his disease. The patient was unaccompanied.      I reviewed treatment options, obtained signed consent, and answered any questions he had regarding the administration of Taxol plus ramucirumab .     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  The patient's oncologist has discussed the same treatment options with the patient and answered his questions prior to today's visit on 2020.    TREATMENT GOALS:  The goal of the treatment is to:    [] Curative intent - intent is cure; cure implies patient survival will not be restricted by current cancer diagnosis   [] Control  - intent is to extend survival but not long enough to meet definition of cure for patient with that diagnosis   [x] Palliative - means treatment given in a non-curative setting to optimize symptom control, improve quality of life, and improve survival    This treatment has been explained to Tyree Orourke. Alternative methods of treatment, if any, have been explained to Tyree Orourke, as have the benefits and risks of each. Based on the physician's explanation of the benefits and risks of this treatment and any alternatives available, the patient agrees the potential benefits outweighs the potential risks involved. I have explained to the patient the most likely complications which might occur from this treatment. The patient understands along with the treatment additional medications may be necessary to lessen the side effects.     SIDE EFFECTS:  Possible side effects may include but are not limited to, any of the  following, or a combination of the following:    []  Abdominal pain  [x]  Hypersensitivity reaction []  Rash   []  Allergic Reaction [x]  Hypertension []  Secondary malignancies   []  Anemia []  Hypertensive crisis  []  Sexual side effects    []  Anxiety []  Hypertriglyceridemia []  Shortness of breath   []  Back pain []  Hypoalbuminemia [x]  Skin changes   []  Body pain []  Hyponatremia  []  Somnolence   []  Blood clots (DVT/PE) []  Immune-mediated reaction []  Sore throat   []  Bleeding []  Infection  [x]  Swelling   []  Bone pain [x]  Infusion reaction  []  Taste changes   []  Bruising []  Injection site reaction  []  Temperature sensitivity   []  Cardiovascular events  []  Injection site ulceration [x]  Thrombocytopenia   []  Central neurotoxicity []  Insomnia []  Thyroid changes   []  Chest pain []  Itching []  Tinnitus   []  Chills [x]  Joint pain []  Upper respiratory tract infection    []  Confusion []  Kidney damage []  Visual changes   []  Congestive heart failure [x]  Leukopenia []  Vitlligo   []  Constipation [x]  LFT imbalances []  Vomiting   []  Cough []  Liver damage []  Watery eyes   []  Depression []  Loss of appetite []  Weakness   [x]  Diarrhea [x]  Low blood pressure []  Weight gain   []  Dizziness []  Lung damage []  Weight loss   []  Dry skin []  Menopausal symptoms []  Wound healing complication   []  Ecchymosis []  Menstrual irregularities [x]  Arterial thrombolic events   []  Electrolyte imbalances []  Metallic taste  [x]  Impaired wound healing   []  Elevated LDH []  Mood changes [x]  Clinical deterioration in patients with cirrhosis    []  Eye irritation [x]  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    []  Fatigue [x]  Muscle aches  []  Other   []  Fertility effects  []  Nephrotic syndrome []  Other   []  Fevers [x]  Nail changes []  Other   []  Fistula formation []  Nausea  []  Other   []  Flu-like symptoms []  Neck pain  []  Other   []  Fluid retention [x]   Neutropenia []  Other   []  Forgetfulness []  Nosebleeds []  Other   []  Gastrointestinal perforation []  Pain in arms/legs []  Other   []  Hand foot syndrome []  Pericardial effusion  []  Other   [x]  Hair loss/discoloration [x]  Peripheral neuropathy []  Other   [x]  Headaches []  Petechiae []  Other   []  Hearing loss/change []  Pharyngitis  []  Other   []  Heart damage []  Photosensitivity  []  Other   []  Hematuria []  Pleural effusion  []  Other   []  Hemorrhage []  Proteinuria  []  Other     VASCULAR ACCESS:  The patient was educated on the possible need for vascular access/port placement.  The patient was advised although uncommon, leakage of an infused medication from the vein or venous access device (port) may lead to skin breakdown and/or other tissue damage.  The patient was advised he may have pain, bleeding, and/or bruising from the insertion of a needle in their vein or venous access device (port).  The patient was further advised despite proper technique, infection with redness and irritation may rarely occur at the site where the needle was inserted.  The patient was advised if complications occur, additional medical treatment is available.    BLOOD COUNT MONITORING:  While receiving treatment, it has been explained to the patient blood counts will be monitored.  This may include but is not limited to a complete blood count (CBC). The patient may develop neutropenia, anemia, or thrombocytopenia. This has been explained and a handout was provided to the patient.     NUTRITION:   It was explained to the patient about nutrition and its importance while undergoing chemotherapy and/or biotherapy. Certain medications will be prescribed during the treatment which may change the way foods taste or smell. These changes may cause poor or no appetite. The patient was advised food is fuel for the body, and if it does not get the fuel it needs, he may become malnourished, which can lead to severe fatigue. It was  discussed with the patient about calories and how to add high-calorie foods to his diet.  Protein was also mentioned in regards to how it will help make new cells for the body. Information was given to Tyree Orourke regarding good protein sources.   It was also discussed with the patient the importance of  eating and drinking every 2-3 hours while awake. We discussed fluid intake of at least 6 to 8 ounce glasses of liquids per day to stay hydrated. Examples are listed below:   Water  Juice (fruit or vegetable)  Soda Sport Drinks Soup   Milk  Ensure, Boost, Glucerna Ice Cream Popsicles Jello   Milkshakes Pudding  Gatorade Sherbert Yogurt     REPRODUCTION:  Reproductive risks were discussed, including appropriate use of birth control, and protection during sexual relations. The risks of becoming pregnant while receiving chemotherapy and/or biotherapy were reviewed for females.  Males were instructed to use appropriate birth control to prevent conception during treatment.  We also discussed the importance of using reliable barrier methods while participating in intimate activities as this may expose their partners to a potentially harmful drug. The importance of pregnancy prevention was emphasized due to risks of increased chance of birth defects and miscarriages.     Tyree Orourke was provided handouts on:   1. Home instructions  2. Complete blood counts and terminology  3. Nutrition during cancer therapy   4. Fluids and dehydration  5. Mouth care  6. Cancer-related fatigue  7. Management of constipation    8. Management of diarrhea   9. Handouts from [facility] on specific drugs  - Taxol and ramucirumab  10. Handouts from [employer] on Zofran   11. A signed copy of chemotherapy/biotherapy consent     TOPICS EDUCATION PROVIDED EDUCATION REINFORCED COMMENTS   ANEMIA:  role of RBC, cause, s/s, ways to manage, role of transfusion [x] [x]    THROMBOCYTOPENIA:  role of platelet, cause, s/s, ways to prevent  bleeding, things to avoid, when to seek help [x] [x]    NEUTROPENIA:  role of WBC, cause, infection precautions, s/s of infection, when to call MD [x] [x]    NUTRITION & APPETITE CHANGES:  importance of maintaining healthy diet & weight, ways to manage to improve intake, dietary consult, exercise regimen [x] [x]    DIARRHEA:  causes, s/s of dehydration, ways to manage, dietary changes, when to call MD [x] [x]    CONSTIPATION:  causes, ways to manage, dietary changes, when to call MD [x] [x]    NAUSEA & VOMITING:  causes, use of antiemetics, dietary changes, when to call MD [x] [x]    MOUTH SORES:  causes, oral care, ways to manage [x] [x]    ALOPECIA:  causes, ways to manage, resources [x] [x]    INFERTILITY & SEXUALITY:  causes, fertility preservation options, sexuality changes, ways to manage, importance of birth control [x] [x]    NERVOUS SYSTEM CHANGES:  causes, s/s, neuropathies, cognitive changes, ways to manage [x] [x]    PAIN:  causes, ways to manage [x] [x]    SKIN & NAIL CHANGES:  cause, s/s, ways to manage [x] [x]    ORGAN TOXICITIES:  cause, s/s, need for diagnostic tests, labs, when to notify MD [x] [x]    SURVIVORSHIP:  distress, distress assessment, secondary malignancies, early/late effects, follow-up, social issues, social support [x] [x]    HOME CARE:  use of spill kits, storing of PO chemo, how to manage bodily fluids [x] [x]    MISCELLANEOUS:  drug interactions, administration, vesicants  [x] [x]      PAST MEDICAL HISTORY:  Past Medical History:   Diagnosis Date   • Esophageal cancer (CMS/HCC)     esophageal    • GERD (gastroesophageal reflux disease)    • Hypertension    • Lupus (CMS/HCC)     skin       PAST SURGICAL HISTORY:  Past Surgical History:   Procedure Laterality Date   • ENDOSCOPY     • REPLACEMENT TOTAL KNEE Left 04/2019   • UPPER ENDOSCOPIC ULTRASOUND W/ FNA N/A 6/9/2020    Procedure: Endoscopic ULTRASOUND;  Surgeon: Uriel Singleton MD;  Location: [facility] ENDOSCOPY;  Service:  Gastroenterology;  Laterality: N/A;  Post operative diagnosis: mass, lymph nodes   • VENOUS ACCESS DEVICE (PORT) INSERTION Left 6/15/2020    Procedure: INSERTION VENOUS ACCESS DEVICE LEFT SUBCLAVIAN UNDER FLOUROSCOPIC GUIDANCE;  Surgeon: Tyree Adkins MD;  Location: [facility] MAIN OR;  Service: Cardiothoracic;  Laterality: Left;       CURRENT MEDICATIONS:    Current Outpatient Medications:   •  apixaban (ELIQUIS) 5 MG tablet tablet, Take 1 tablet by mouth Every 12 (Twelve) Hours. (Patient taking differently: Take 5 mg by mouth Daily.), Disp: 60 tablet, Rfl: 3  •  famotidine (PEPCID) 20 MG tablet, Take 40 mg by mouth Daily., Disp: , Rfl:   •  ferrous gluconate (FERGON) 324 MG tablet, Take 1 tablet by mouth 2 (Two) Times a Day., Disp: 60 tablet, Rfl: 11  •  ferrous sulfate 325 (65 FE) MG tablet, Take 325 mg by mouth Daily., Disp: , Rfl:   •  lisinopril (PRINIVIL,ZESTRIL) 10 MG tablet, Take 10 mg by mouth Daily. Take preop, Disp: , Rfl:   •  LORazepam (Ativan) 0.5 MG tablet, Take 1 tablet by mouth Every 6 (Six) Hours As Needed (Chemotherapy-induced nausea and vomiting)., Disp: 20 tablet, Rfl: 0  •  metoclopramide (REGLAN) 5 MG tablet, Take 2 tablets by mouth 2 (two) times a day., Disp: 60 tablet, Rfl: 3  •  Multiple Vitamins-Minerals (MULTIVITAMIN ADULT PO), Take  by mouth., Disp: , Rfl:     ALLERGIES:  No Known Allergies    FAMILY HISTORY:  Family History   Problem Relation Age of Onset   • Leukemia Mother         CLL     ONCOLOGIC FAMILY HISTORY:  Cancer-related family history is not on file.    SOCIAL HISTORY:  Social History     Tobacco Use   • Smoking status: Never Smoker   • Smokeless tobacco: Never Used   Substance Use Topics   • Alcohol use: Yes     Frequency: 2-4 times a month     Comment: Socially   • Drug use: Never       HPI, ROS and PFSH have been reviewed and confirmed on 12/4/2020.     REVIEW OF SYSTEMS:  Review of Systems   Constitutional: Negative for activity change, appetite change, chills,  fatigue, fever and unexpected weight change.   HENT: Negative for mouth sores, sore throat and tinnitus.    Eyes: Negative for photophobia, pain, redness and visual disturbance.   Respiratory: Negative for cough and shortness of breath.    Cardiovascular: Negative for chest pain, palpitations and leg swelling.   Gastrointestinal: Negative for abdominal pain, constipation, diarrhea, nausea and vomiting.   Genitourinary: Negative for dysuria and hematuria.   Musculoskeletal: Negative for arthralgias, back pain, myalgias and neck pain.   Skin: Negative for rash and wound.   Allergic/Immunologic: Negative for environmental allergies.   Neurological: Negative for dizziness, weakness, light-headedness, numbness and headaches.   Hematological: Negative for adenopathy. Does not bruise/bleed easily.   Psychiatric/Behavioral: Negative for dysphoric mood. The patient is not nervous/anxious.    All other systems reviewed and are negative.      PHYSICAL EXAMINATION:  Physical Exam  Vitals signs reviewed.   Constitutional:       General: He is not in acute distress.     Appearance: He is well-developed. He is not diaphoretic.   HENT:      Head: Normocephalic and atraumatic.      Mouth/Throat:      Pharynx: No oropharyngeal exudate.   Eyes:      Conjunctiva/sclera: Conjunctivae normal.   Neck:      Musculoskeletal: Normal range of motion and neck supple.      Thyroid: No thyromegaly.      Vascular: No JVD.   Cardiovascular:      Rate and Rhythm: Normal rate and regular rhythm.      Heart sounds: Normal heart sounds. No murmur.   Pulmonary:      Effort: Pulmonary effort is normal. No respiratory distress.      Breath sounds: Normal breath sounds.   Abdominal:      General: Bowel sounds are normal.      Palpations: Abdomen is soft.      Tenderness: There is no abdominal tenderness. There is no guarding.   Musculoskeletal: Normal range of motion.   Skin:     General: Skin is warm and dry.      Findings: No erythema or rash.    Neurological:      Mental Status: He is alert and oriented to person, place, and time.      Sensory: No sensory deficit.   Psychiatric:         Behavior: Behavior normal.         LABS:  WBC   Date Value Ref Range Status   09/22/2020 7.51 3.40 - 10.80 10*3/mm3 Final   07/27/2020 2.44 (L) 4.5 - 11.0 10*3/uL Final     RBC   Date Value Ref Range Status   09/22/2020 3.97 (L) 4.14 - 5.80 10*6/mm3 Final   07/27/2020 4.49 (L) 4.5 - 5.9 10*6/uL Final     Hemoglobin   Date Value Ref Range Status   09/22/2020 11.9 (L) 13.0 - 17.7 g/dL Final   07/27/2020 12.5 (L) 13.5 - 17.5 g/dL Final     Hematocrit   Date Value Ref Range Status   09/22/2020 34.6 (L) 37.5 - 51.0 % Final   07/27/2020 37.4 (L) 41.0 - 53.0 % Final     MCV   Date Value Ref Range Status   09/22/2020 87.2 79.0 - 97.0 fL Final   07/27/2020 83.3 80.0 - 100.0 fL Final     MCH   Date Value Ref Range Status   09/22/2020 30.0 26.6 - 33.0 pg Final   07/27/2020 27.8 26.0 - 34.0 pg Final     MCHC   Date Value Ref Range Status   09/22/2020 34.4 31.5 - 35.7 g/dL Final   07/27/2020 33.4 31.0 - 37.0 g/dL Final     RDW   Date Value Ref Range Status   09/22/2020 16.7 (H) 12.3 - 15.4 % Final   07/27/2020 13.9 12.0 - 16.8 % Final     RDW-SD   Date Value Ref Range Status   09/22/2020 52.1 37.0 - 54.0 fl Final     MPV   Date Value Ref Range Status   09/22/2020 10.4 6.0 - 12.0 fL Final   07/27/2020 10.9 8.4 - 12.4 fL Final     Platelets   Date Value Ref Range Status   09/22/2020 236 140 - 450 10*3/mm3 Final   07/27/2020 191 140 - 440 10*3/uL Final     Neutrophil Rel %   Date Value Ref Range Status   07/27/2020 16.5 (L) 45 - 80 % Final     Neutrophil %   Date Value Ref Range Status   09/22/2020 86.7 (H) 42.7 - 76.0 % Final     Lymphocyte Rel %   Date Value Ref Range Status   07/27/2020 46.1 15 - 50 % Final     Lymphocyte %   Date Value Ref Range Status   09/22/2020 9.9 (L) 19.6 - 45.3 % Final     Monocyte Rel %   Date Value Ref Range Status   07/27/2020 27.8 (H) 0 - 15 % Final      Monocyte %   Date Value Ref Range Status   09/22/2020 3.3 (L) 5.0 - 12.0 % Final     Eosinophil %   Date Value Ref Range Status   09/22/2020 0.0 (L) 0.3 - 6.2 % Final   07/27/2020 3.5 0 - 7 % Final     Basophil Rel %   Date Value Ref Range Status   07/27/2020 6.1 (H) 0 - 2 % Final     Basophil %   Date Value Ref Range Status   09/22/2020 0.1 0.0 - 1.5 % Final     Neutrophils Absolute   Date Value Ref Range Status   07/27/2020 0.40 (L) 1.5 - 7.5 /uL Final   07/27/2020 0.40 (L) 2.0 - 8.8 10*3/uL Final     Neutrophils, Absolute   Date Value Ref Range Status   09/22/2020 6.51 1.70 - 7.00 10*3/mm3 Final     Lymphocytes Absolute   Date Value Ref Range Status   07/27/2020 1.12 0.7 - 5.5 10*3/uL Final     Lymphocytes, Absolute   Date Value Ref Range Status   09/22/2020 0.74 0.70 - 3.10 10*3/mm3 Final     Monocytes Absolute   Date Value Ref Range Status   07/27/2020 0.68 0.0 - 1.7 10*3/uL Final     Monocytes, Absolute   Date Value Ref Range Status   09/22/2020 0.25 0.10 - 0.90 10*3/mm3 Final     Eosinophils Absolute   Date Value Ref Range Status   07/27/2020 0.09 0.0 - 0.8 10*3/uL Final     Eosinophils, Absolute   Date Value Ref Range Status   09/22/2020 0.00 0.00 - 0.40 10*3/mm3 Final     Basophils Absolute   Date Value Ref Range Status   07/27/2020 0.15 0.0 - 0.2 10*3/uL Final     Basophils, Absolute   Date Value Ref Range Status   09/22/2020 0.01 0.00 - 0.20 10*3/mm3 Final     nRBC   Date Value Ref Range Status   07/27/2020 0 0 /100(WBC) Final     Lab Results   Component Value Date    GLUCOSE 170 (H) 09/22/2020    BUN  09/22/2020      Comment:      Testing performed by alternate method    BUN 12 09/22/2020    CREATININE 0.80 09/22/2020    EGFRIFNONA 99 09/22/2020    BCR  09/22/2020      Comment:      Testing not performed    K 4.3 09/22/2020    CO2 25.0 09/22/2020    CALCIUM 9.1 09/22/2020    ALBUMIN 3.60 09/22/2020    LABIL2 1.3 07/27/2020    AST 28 09/22/2020    ALT 22 09/22/2020       Assessment/Plan   There are no  diagnoses linked to this encounter.  ASSESSMENT   1. Encounter for medication management and education of chemotherapy/biotherapy    2. 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 Weight loss - unintentional   4. GERD     PLAN  • Start Taxol plus ramucirumab on 12/8/2020  • Start Zofran 8 mg p.o. 3 times daily PRN nausea/vomiting, escribed new prescription.   Start Carafate 1 gram po QID, escribed new prescription   • Notified , financial counselor, and dietician patient starting new treatment - dietician saw patient today   • See GI next week for GERD  • RTC  with Jackson Peters MD on 12/22/2020    I have reviewed labs results, imaging, vitals, and medications with the patient today.    A total of 45 minutes were spent with the patient, with greater then 50% of time spent in education and counseling.    Electronically signed by NATHAN Urbina, 12/04/20, 2:17 PM EST.

## 2020-12-04 NOTE — PROGRESS NOTES
"                 Nutrition Assessment  Tyree Orourke    Anthropometrics  Height: 5'10\"  Weight: 135.5#  BMI: 19.5  Weight Hx:   (5/18/20) 197#  (5/26/20) 188.4#  (6/9/20) 180.9#  (6/15/20) 180.7#  (6/30/20) 176.3#  (7/1/20) 176.3#  (7/7/20) 171#  (7/14/20) 171.9#  (8/20/20) 169.9#  (9/17/20) 169.9#  (9/22/20) 168.4#  (10/20/20) 154.1#  (11/18/20) 144.6#  (11/24/20) 136.9#  IBW: 166# (81.6%)  UBW: 210# (64.5%)    24-Hour Diet Recall:  Breakfast - 2 hohos, water  Lunch - chocolate pudding  Dinner - tuna with barrett  Snacks - yogurt, 2 Sqwincher Sqweeze popsicles (electrolyte replenishing), chips dipped in BBQ sauce    Discussion: Met with pt to provide diet education for new treatment plan. We reviewed the possible side effects and how they may effect his diet and ways to manage them with diet modifications, pt verbalized understanding.    Pt continuing to lose weight due to an ability to consume adequate calories r/t nausea when eating, likely r/t his diagnosis. Pt has a GI consult to further investigate the cause of his nausea, and the appropriate placement of a feeding tube. Due to pt's diagnosis, it may be necessary to have a J-tube vs a G-tube, discussed this with the pt, pt verbalized understanding. We reviewed the different options for administering tube feedings, and explained the GI doctor will determine the medically appropriate tube position for his condition, pt verbalized understanding.    Pt said he would like to gain weight, with a goal weight between 170# - 185#. Pt continues to be active at his job as the  of [employer], stating he had over 8,000 steps before noon yesterday.    All questions and concerns were addressed and answered. I provided my contact information and encouraged him to call or schedule an appointment as needed.    Nutrient Needs:  Calories: 2156 kcals (35kcals/kg)  Protein: 105 gm (1.7gm/kg)  Fluid: 1847 ml (30ml/kg)    Nutrition Prescription:  [employer]olus Feeds: " Peptamen 1.5  6 cartons/day + 230 ml water flush TID  Provides: 2250 kcals (104.3%), 102 gm (97.1%), 1158 ml free water + 230 ml water flush TID (100%)    Continuous Feeds: Peptamen 1.5  Peptamen 1.5 @ 67 ml/hr x 22 hours + 709 ml water flush  Provides: 2211 kcals (102.5%), 100.2 gm (95.4%), 1138 ml free water + 709 ml water flush (100%)    Karlie Langford, MS,RD,LD-[place],CD-[place]  Registered Dietitian

## 2020-12-08 NOTE — PROGRESS NOTES
Pt w/ complaints of rash on upper and lower extremities as well as chest and face.  Complains of itching.  Cyramza stopped at 1408.  Normal saline already infusing.  Dr Gupta arrived at bedside at 1412.  Orders to give Pepcid 20 mg IV and Solu Cortef 50 mg IV.  Medications given.      1530:  Spoke w/ Dr Gupta.  Orders received to give another 50 mg IV Solu Cortef and Tylenol 650 mg po.  Pt instructed to take Benadryl 25 mg po at home at 1830 per Dr Gupta.  Pt to return tomorrow for remainder of treatment.  Rickie Hills, pharmacist notified.    1650:  Pt feeling better.  Rash is getting better.  Pt w/ no complaints of SOA.  Port flushed w/ normal saline and heparin.  Needle removed.  Pt to return tomorrow at 0800 to receive remainder of Cyramza and Taxol.  Pt v/u.  AVS given.  Rickie to calculate remaining dose.  Kacie Tinoco, clinical manager notified of the above.

## 2020-12-08 NOTE — PROGRESS NOTES
HEMATOLOGY ONCOLOGY OUTPATIENT FOLLOW UP       Patient name: Tyree Orourke  : 1960  MRN: [id number]  Primary Care Physician: Glenys Brewster MD  Referring Physician: Glenys Brewster MD  Reason For Consult:     No chief complaint on file.  Poorly differentiated signet ring gastric carcinoma.       HPI:   History of Present Illness:  Tyree Orourke is 60 y.o. 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  He reports symptoms of GERD  many years ago, took Tums regularly then went away; about 15 years remotely.  He unfortunately developed GI symptoms about 2 months ago.  His weight down 20 lbs in 2 months. He denied any pain or heartburn.  Over the last two months, he does report SOB easily with exertion.  He saw his PCP Dr. Brewster who ordered work-up. He has had the following imaging and work-up/biospy:.  Patient is not a smoker and quit about 20 years ago.  He works as a supervisor at [email or web address].    · 2020: Creatinine 0.85, ALT 15, AST 21 alk phos 100, bilirubin 0.6, albumin 3.7, calcium 9.0,  · 2020: Patient underwent upper GI endoscopy: Surgical pathology of gastric fundus biopsy shows poorly differentiated carcinoma with signet ring cell features involving cardio oxyntic mucosa.  Gastric antrum biopsy shows chronic gastritis.  Negative for H. Pylori.  · 2020: CT chest and abdomen with contrast: There is ill-defined low-density lesion posteriorly in the right lobe of the liver which could be a metastatic lesion, measuring 2 cm,.  There is a significant thickening throughout the GE junction region extending to the level of the proximal stomach, suspicious for tumor.  Soft tissue planes are ill-defined at the inferior aspect of this area due to tumor infiltration.  There is moderate adenopathy in the retroperitoneum and periceliac region.   Evidence of PE.    · 5/14/2020: CT angiogram of the chest with contrast: Bilateral segmental pulmonary emboli without definitive evidence of right heart strain.  Small nodule along the left major fissure consistent with a intrapulmonary lymph node.  Redemonstration of lower esophageal/gastric mass with surrounding lymphadenopathy and a hypoattenuating lesion in the right hepatic lobe.      · 5/18/2020: The patient is here for his Initial Consultation  appointment with Dr. Peters from Dr. Pettit. He has had symptoms for about 2 months. The patient denies a lot of pain. The patient has lost at least 20lbs since the symptoms started. He denies any heartburn and has been taking Zantac BID to help prevent developing it. He will d/c that and start taking Pepcid. He says that he gets SOB easily with exertion. The patient denies any past medical problems except Lupus and high blood pressure. The patient stated he is supposed to fly out on a trip to [place] tomorrow until Saturday for a golf trip. The patient is being given Eliquis samples 10mg BID for one week and then 5mg BID after that.   · 5/18/2020: Patient started Eliquis.    · 5/26/2020: PET CT scan: There is metabolic activity around the area of the GE junction with thickening of the wall of the bowel in this area.  Maximum SUV 4.6.  No areas of abnormal metabolic activity are seen within the abdomen or pelvis.  There are gallstones within the gallbladder without inflammation.  · 5/28/2020 CEA 13.3, d-dimer 3.28 high, ferritin 104.7, iron 31 low, iron saturation 9% low, TIBC 359,  · 5/29/2020: MRI abdomen with and without contrast: Hemangioma within the right lobe of the liver measuring 2.1 cm.  Stable appearance of multiple retroperitoneal lymph nodes, some of which are considered pathological based on size.  They would be suspicious for metastasis.  Nodular thickening and enhancement of the distal esophagus and fundus of the stomach.    · 6/9/2020: Endoscopic  ultrasound by Dr. Singleton.:A large GE junction mass starting at 36 cm and traversing into the stomach/cardia.  The majority of this has broken into or through the muscularis propria and there is a location where there is a loss of plane between the liver and the mass suggestive of possible invasion of the hepatic capsule making this a T4 lesion.  At least 4 lymph nodes in the celiac axis were visualized with the largest measuring 1.7 cm.  They are round and hypoechoic suggestive of underlying malignancy.  These cannot be biopsied as they were directly behind the mass.   Normal pancreatic uncinate head, neck body and tail.  Normal ampulla and common bile duct.  · 6/15/2020: WBC 7.8, hemoglobin 13.1, platelets 345, MCV 84.2, creatinine 0.83,  · 6/30/2020: Patient received cycle 1 of FLOT, WBC 6.1, hemoglobin 12.8, platelets 286, creatinine 0.86, LFTs normal, CA-19-9 628.5 high, CEA 15.1,  · 7/7/2020: WBC 3.88, hemoglobin 13, platelets 229  · 7/14/2020: Patient received cycle 2 of FLOT  · 7/27/2020 WBC 2.44, hemoglobin 12.5, platelets 191, creatinine 0.7  · 7/30/2020: Patient received a cycle 3 of FLOT.  WBC 2.7, hemoglobin 11.8, platelets 198, creatinine 0.78  · 8/13/2020: Patient underwent diagnostic laparoscopy and peritoneal washings at [facility] with Dr. Solo.  · 8/13/2020: GE junction mass biopsy: Adenocarcinoma with mucinous features and focal signet ring cells.  · 8/13/2020: Ascites fluid: No malignant cells noted.  However peritoneal washings are positive for rare malignant cells.  · 8/20/2020: Patient received cycle 4 of FLOT.  WBC 11.2, hemoglobin 12.3, platelets 280, creatinine 1, CA-19-9 207.5 high, CEA 4.4,  · 8/27/2020: WBC 5.1, hemoglobin 11.5, platelet count 166  · 9/3/2020: Patient received cycle 5 of FLOT.  WBC 2.1, hemoglobin 11.8, platelets 171, creatinine 0.8,  · 19 2020: WBC 2.56, hemoglobin 11.9, platelets 162, ,  · 9/17/2020: Cycle 6 chemotherapy held due to neutropenia with a  WBC 1.5,, ,  · 9/22/2020  cycle 6 of FLOT.  WBC 7.4, hemoglobin 11.9, platelets 236, MCV 87.2, LFTs normal,  · 10/19/2020: CT chest abdomen and pelvis with contrast: Abnormal masslike concentric thickening of the lower third of the esophagus, extending across the GE junction into the stomach consistent with malignancy.  Degree of involvement of the lower esophagus has increased.  There now appears to be invasion of the adjacent diaphragmatic crura, increased contiguous metastatic extension posterior to the pancreas and increased involvement of the gastrohepatic ligament..  Previously seen PE has resolved.  Approximately 2.4 cm low-density lesion in right hepatic lobe consistent with hemangioma.  Retroperitoneal adenopathy in the midabdomen is again noted.  The mass also appears to directly invade the left adrenal gland.  ·   Treatment Site Ref. ID Energy Dose/Fx (cGy) #Fx Dose Correction (cGy) Total Dose (cGy) Start Date End Date Elapsed Days   Abdomen Abdomen 10X/18X 250 14 / 14 0 3,500 10/27/2020 11/17/2020 21     · 12/8/2020 patient received cycle 1 of Taxol and ramucirumab    Subjective:     More than nursing home into his infusion for ramucirumab patient developed hypersensitivity reaction with discoloration involving his face arms and chest area associated with itching.  He denies any difficulty swallowing or nausea or vomiting.  His vital signs remained stable.    The following portions of the patient's history were reviewed and updated as appropriate: allergies, current medications, past family history, past medical history, past social history, past surgical history and problem list.    Past Medical History:   Diagnosis Date   • Esophageal cancer (CMS/HCC)     esophageal    • GERD (gastroesophageal reflux disease)    • Hypertension    • Lupus (CMS/HCC)     skin     Past Surgical History:   Procedure Laterality Date   • ENDOSCOPY     • REPLACEMENT TOTAL KNEE Left 04/2019   • UPPER ENDOSCOPIC ULTRASOUND W/  FNA N/A 6/9/2020    Procedure: Endoscopic ULTRASOUND;  Surgeon: Uriel Singleton MD;  Location: [facility] ENDOSCOPY;  Service: Gastroenterology;  Laterality: N/A;  Post operative diagnosis: mass, lymph nodes   • VENOUS ACCESS DEVICE (PORT) INSERTION Left 6/15/2020    Procedure: INSERTION VENOUS ACCESS DEVICE LEFT SUBCLAVIAN UNDER FLOUROSCOPIC GUIDANCE;  Surgeon: Tyree Adkins MD;  Location: [facility] MAIN OR;  Service: Cardiothoracic;  Laterality: Left;       Current Outpatient Medications:   •  apixaban (ELIQUIS) 5 MG tablet tablet, Take 1 tablet by mouth Every 12 (Twelve) Hours. (Patient taking differently: Take 5 mg by mouth Daily.), Disp: 60 tablet, Rfl: 3  •  famotidine (PEPCID) 20 MG tablet, Take 40 mg by mouth Daily., Disp: , Rfl:   •  ferrous gluconate (FERGON) 324 MG tablet, Take 1 tablet by mouth 2 (Two) Times a Day., Disp: 60 tablet, Rfl: 11  •  ferrous sulfate 325 (65 FE) MG tablet, Take 325 mg by mouth Daily., Disp: , Rfl:   •  LORazepam (Ativan) 0.5 MG tablet, Take 1 tablet by mouth Every 6 (Six) Hours As Needed (Chemotherapy-induced nausea and vomiting)., Disp: 20 tablet, Rfl: 0  •  metoclopramide (REGLAN) 5 MG tablet, Take 2 tablets by mouth 2 (two) times a day., Disp: 60 tablet, Rfl: 3  •  Multiple Vitamins-Minerals (MULTIVITAMIN ADULT PO), Take  by mouth., Disp: , Rfl:   •  ondansetron (ZOFRAN) 8 MG tablet, Take 1 tablet by mouth 3 (Three) Times a Day As Needed for Nausea or Vomiting., Disp: 30 tablet, Rfl: 5  •  sucralfate (CARAFATE) 1 GM/10ML suspension, Take 10 mL by mouth 4 (Four) Times a Day With Meals & at Bedtime., Disp: 1200 mL, Rfl: 5  No current facility-administered medications for this visit.     Facility-Administered Medications Ordered in Other Visits:   •  diphenhydrAMINE (BENADRYL) injection 50 mg, 50 mg, Intravenous, PRN, Jakcson Peters MD  •  heparin injection 500 Units, 500 Units, Intravenous, PRN, Jackson Peters MD, 500 Units at 12/08/20 1645  •  PACLitaxel (TAXOL) 140 mg in sodium  chloride 0.9 % 273.3 mL chemo IVPB, 80 mg/m2 (Treatment Plan Recorded), Intravenous, Once, Jackson Peters MD, Stopped at 12/08/20 1643  •  sodium chloride 0.9 % flush 20 mL, 20 mL, Intravenous, PRN, Jackson Peters MD, 10 mL at 12/08/20 1645    No Known Allergies    Family History   Problem Relation Age of Onset   • Leukemia Mother         CLL     Cancer-related family history is not on file.    Social History     Tobacco Use   • Smoking status: Never Smoker   • Smokeless tobacco: Never Used   Substance Use Topics   • Alcohol use: Yes     Frequency: 2-4 times a month     Comment: Socially   • Drug use: Never     Social History     Social History Narrative    Patient lives in [place], [place], with his wife. He has one child. He does not smoke. He works at [employer].      ROS:     Review of Systems   Constitutional: Positive for fatigue. Negative for activity change, chills and fever.   HENT: Negative for drooling, ear discharge, mouth sores, nosebleeds and sore throat.    Eyes: Negative for photophobia, pain, redness and visual disturbance.   Respiratory: Negative for cough, choking, shortness of breath and wheezing.    Cardiovascular: Negative for chest pain and palpitations.   Gastrointestinal: Negative for abdominal pain, diarrhea, nausea and vomiting.        .  Weight loss.  Reflux, regurgitation, dysphagia   Genitourinary: Negative for difficulty urinating, dysuria and urgency.   Musculoskeletal: Negative for neck stiffness.   Skin: Positive for rash.   Neurological: Negative for tremors, seizures, syncope and speech difficulty.        Reports right foot drop.   Psychiatric/Behavioral: Negative for agitation, confusion and hallucinations.     I have reviewed and confirmed the accuracy of the patient's history which includes chief complaint, subjective, HPI, ROS,as entered by the MA/LPN/RN.    Objective:    Vitals:    12/08/20 1429   BP: 106/69   Pulse: 94   Resp: 18   Temp: 98.4 °F (36.9 °C)   TempSrc: Temporal   Weight:  "60.3 kg (133 lb)   Height: 177.8 cm (70\")   PainSc: 0-No pain     Body mass index is 19.08 kg/m².  ECOG  (0) Fully active, able to carry on all predisease performance without restriction    Physical Exam:     Physical Exam   Constitutional: He is oriented to person, place, and time. He appears well-developed. He appears ill. No distress.   HENT:   Head: Normocephalic and atraumatic.   Eyes: Conjunctivae are normal. Right eye exhibits no discharge. Left eye exhibits no discharge. No scleral icterus.   Neck: Normal range of motion. Neck supple. No thyromegaly present.   Cardiovascular: Normal rate, regular rhythm and normal heart sounds. Exam reveals no gallop and no friction rub.   Pulmonary/Chest: Effort normal and breath sounds normal. No stridor. No respiratory distress. He has no wheezes.   Port    Abdominal: Soft. Normal appearance and bowel sounds are normal. He exhibits no mass. There is no abdominal tenderness. There is no rebound and no guarding.   Appears malnourished   Musculoskeletal: Normal range of motion. No swelling or tenderness.   Lymphadenopathy:     He has no cervical adenopathy.   Neurological: He is alert and oriented to person, place, and time. He exhibits normal muscle tone.   Right foot drop   Skin: Skin is warm. No rash noted. He is not diaphoretic. No erythema.   Diffuse erythematous skin reaction involving the face, anterior chest, upper extremities itchy.   Psychiatric: His behavior is normal. Mood and thought content normal.   Nursing note and vitals reviewed.    I have reviewed and confirmed the accuracy of the patient's history: Chief complaint, HPI, ROS and Subjective as entered by the MA/LPN/RN. Jennifer Moira Gupta MD 12/08/20     Lab Results - Last 18 Months   Lab Units 12/08/20  1130 09/22/20  0819 09/17/20  0808   WBC 10*3/mm3 6.23 7.51 1.51*   HEMOGLOBIN g/dL 12.2* 11.9* 11.3*   HEMATOCRIT % 35.5* 34.6* 32.4*   PLATELETS 10*3/mm3 195 236 163   MCV fL 89.2 87.2 85.0     Lab " Results - Last 18 Months   Lab Units 12/08/20  1130 09/22/20  0819 09/17/20  0808   SODIUM mmol/L 134* 138 137   POTASSIUM mmol/L 3.3* 4.3 4.1   CHLORIDE mmol/L 98 103 103   CO2 mmol/L 25.0 25.0 21.0*   BUN mg/dL 13 12 12   CREATININE mg/dL 0.91 0.80 0.80   CALCIUM mg/dL 9.3 9.1 9.1   BILIRUBIN mg/dL 0.5 0.4 0.4   ALK PHOS U/L 97 83 85   ALT (SGPT) U/L 10 22 20   AST (SGOT) U/L 21 28 29   GLUCOSE mg/dL 180* 170* 170*     Lab Results   Component Value Date    GLUCOSE 180 (H) 12/08/2020    BUN 13 12/08/2020    CREATININE 0.91 12/08/2020    EGFRIFNONA 85 12/08/2020    BCR 14.3 12/08/2020    K 3.3 (L) 12/08/2020    CO2 25.0 12/08/2020    CALCIUM 9.3 12/08/2020    ALBUMIN 3.50 12/08/2020    LABIL2 1.3 07/27/2020    AST 21 12/08/2020    ALT 10 12/08/2020       Lab Results - Last 18 Months   Lab Units 08/13/20  0623 07/27/20  1125   INR INR 1.2 1.0   APTT s  --  31.5     Lab Results   Component Value Date    IRON 31 (L) 05/28/2020    TIBC 359 05/28/2020    FERRITIN 104.70 05/28/2020     No results found for: FOLATE    No results found for: OCCULTBLD    No results found for: RETICCTPCT    No results found for: WHAJUFEC32  No results found for: SPEP, UPEP  No results found for: LDH, URICACID  No results found for: ALFREDA, RF, SEDRATE  No results found for: FIBRINOGEN, HAPTOGLOBIN  Lab Results   Component Value Date    PTT 31.5 07/27/2020    INR 1.2 08/13/2020     No results found for:   Lab Results   Component Value Date    CEA 4.44 08/20/2020     No components found for: CA-19-9  No results found for: PSA    Assessment/Plan     Assessment:  1. 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  He does have retroperitoneal lymph node involvement as well as celiac lymph node metastases.  Mass causing liver capsule invasion.  Patient presented with dysphagia and had 30 pound weight loss.  Started palliative chemotherapy with  FLOT.  Patient  did experience neuropathy from the chemotherapy..  Patient had laparoscopy and peritoneal lavage that showed some malignant cells.  Patient finished her 6 cycles as of 9/22/2020.  Unfortunately restaging CT scan on 10/19/2020 shows evidence of disease progression.  Patient started having upper GI obstructive symptoms due to tumor invasion, and also was having pain symptoms due to tumor invading towards pancreas, diaphragm, left adrenal etc. Patient received palliative radiation therapy due to significant tumor mass causing local symptoms.  2. Regurgitation/mild dysphagia: Due to symptoms of tumor obstructing the upper GI tract.  3. Weight loss: Due to cancer related cachexia/anorexia.  Also early satiety  4. Pulmonary embolism:  on Eliquis .  Thrombosis very likely secondary to malignancy.   5. Anemia/leukopenia: Due to myelosuppression from chemotherapy  6. Iron deficiency  7. Right foot drop: Unclear if this is due to chemotherapy.  Does not have neuropathy in other extremities.  Patient saw orthopedics.  They have recommended a splint.  8. Cutaneous reaction to ramucirumab: Responded to steroids, H2 blockers and Benadryl      Plan:        1. Patient responded to steroids, H2 blockers and Benadryl  2. Complete first cycle of chemotherapy on 12/9/2020 Taxol and ramucirumab    Prior discussion with Dr. Peters  3. Discussed with Dr. Solo.  It is okay to proceed with the feeding tube at this point, no contraindication since patient is not going to be planned for any definitive surgery.  4. Discussed chemotherapy options which would include Taxol plus ramucirumab or FOLFIRI.  Unfortunately his tumor is not expressing any molecular markers that would allow targeted therapy.  5. Discussed with radiation oncologist Dr. Cee.  Patient seems to be having upper GI obstructive symptoms due to tumor invasion.  Patient is also at risk of having pain symptoms due to tumor invading towards pancreas, diaphragm, left adrenal  etc.  6. Continue Eliquis.  7. Prescribed Reglan before meals  8. Foot drop:  Continue ankle splint.  9. We will continue to monitor CEA and CA-19-9  10. Follow-up in about 4 weeks      I have reviewed and confirmed the accuracy of the patient's history: Chief complaint, HPI, ROS and Subjective as entered by the MA/LPN/RN. Jennifer Gupta MD 12/08/20            .

## 2020-12-09 ENCOUNTER — OFFICE (OUTPATIENT)
Dept: URBAN - METROPOLITAN AREA CLINIC 64 | Facility: CLINIC | Age: 60
End: 2020-12-09
Payer: COMMERCIAL

## 2020-12-09 VITALS
HEART RATE: 76 BPM | DIASTOLIC BLOOD PRESSURE: 98 MMHG | WEIGHT: 135 LBS | SYSTOLIC BLOOD PRESSURE: 129 MMHG | HEIGHT: 70 IN

## 2020-12-09 DIAGNOSIS — R63.4 ABNORMAL WEIGHT LOSS: ICD-10-CM

## 2020-12-09 DIAGNOSIS — R11.2 NAUSEA WITH VOMITING, UNSPECIFIED: ICD-10-CM

## 2020-12-09 DIAGNOSIS — C15.9 MALIGNANT NEOPLASM OF ESOPHAGUS, UNSPECIFIED: ICD-10-CM

## 2020-12-09 DIAGNOSIS — R13.10 DYSPHAGIA, UNSPECIFIED: ICD-10-CM

## 2020-12-09 PROCEDURE — 99214 OFFICE O/P EST MOD 30 MIN: CPT | Performed by: INTERNAL MEDICINE

## 2020-12-09 NOTE — PROGRESS NOTES
Pt. Here at clinic to complete C1D1 Cyramza, Taxol  Pt's Potassium level 3.3 on 12/8/2020, NP notified of pt's lab results  Prescription sent to pt's pharmacy for oral potassium supplement per Nadja EVANS  Pt. Verbalized understanding of all orders/instructions.

## 2020-12-09 NOTE — TELEPHONE ENCOUNTER
I called the pt to inquire about his GI doctor visit and details regarding a possible feeding tube placement. According to the pt, his GI doctor said he has two options, a feeding tube directly in his stomach, or a stent placement allowing him to eat by mouth comfortably. Pt is scheduled to have an EGD on Monday, December 14th to determine which will be more appropriate for him.     All questions and concerns were addressed and answered. I provided my contact information and encouraged him to call or schedule an appointment as needed. I will continue to follow and provide care as needed.     Karlie Langford, MS,RD,LD-[place],CD-[place]  Registered Dietitian

## 2020-12-10 NOTE — TELEPHONE ENCOUNTER
On 12- I discussed a copay card for Cyramza with the patient.  Patient gave permission to enroll into the program.  The application was faxed to [email or web address].  Additional information was requested and I called [email or web address] to provide the additional information.  The approval letter was recd today.

## 2020-12-14 ENCOUNTER — ON CAMPUS - OUTPATIENT (OUTPATIENT)
Dept: URBAN - METROPOLITAN AREA HOSPITAL 77 | Facility: HOSPITAL | Age: 60
End: 2020-12-14
Payer: COMMERCIAL

## 2020-12-14 DIAGNOSIS — R13.10 DYSPHAGIA, UNSPECIFIED: ICD-10-CM

## 2020-12-14 DIAGNOSIS — C15.9 MALIGNANT NEOPLASM OF ESOPHAGUS, UNSPECIFIED: ICD-10-CM

## 2020-12-14 PROCEDURE — 43235 EGD DIAGNOSTIC BRUSH WASH: CPT | Performed by: INTERNAL MEDICINE

## 2020-12-15 NOTE — PROGRESS NOTES
HEMATOLOGY ONCOLOGY OUTPATIENT FOLLOW UP       Patient name: Tyree Orourek  : 1960  MRN: [id number]  Primary Care Physician: Glenys Brewster MD  Referring Physician: Glenys Brewster MD  Reason For Consult:     Chief Complaint   Patient presents with   • Follow-up     malignant neoplasm of esophagus   Poorly differentiated signet ring gastric carcinoma.       HPI:   History of Present Illness:  Tyree Orourke is 60 y.o. 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  He reports symptoms of GERD  many years ago, took Tums regularly then went away; about 15 years remotely.  He unfortunately developed GI symptoms about 2 months ago.  His weight down 20 lbs in 2 months. He denied any pain or heartburn.  Over the last two months, he does report SOB easily with exertion.  He saw his PCP Dr. Brewster who ordered work-up. He has had the following imaging and work-up/biospy:.  Patient is not a smoker and quit about 20 years ago.  He works as a supervisor at [employer].    · 2020: Creatinine 0.85, ALT 15, AST 21 alk phos 100, bilirubin 0.6, albumin 3.7, calcium 9.0,  · 2020: Patient underwent upper GI endoscopy: Surgical pathology of gastric fundus biopsy shows poorly differentiated carcinoma with signet ring cell features involving cardio oxyntic mucosa.  Gastric antrum biopsy shows chronic gastritis.  Negative for H. Pylori.  · 2020: CT chest and abdomen with contrast: There is ill-defined low-density lesion posteriorly in the right lobe of the liver which could be a metastatic lesion, measuring 2 cm,.  There is a significant thickening throughout the GE junction region extending to the level of the proximal stomach, suspicious for tumor.  Soft tissue planes are ill-defined at the inferior aspect of this area due to tumor infiltration.  There is  Patient Education        Sore Throat: Care Instructions  Your Care Instructions     Infection by bacteria or a virus causes most sore throats. Cigarette smoke, dry air, air pollution, allergies, and yelling can also cause a sore throat. Sore throats can be painful and annoying. Fortunately, most sore throats go away on their own. If you have a bacterial infection, your doctor may prescribe antibiotics. Follow-up care is a key part of your treatment and safety. Be sure to make and go to all appointments, and call your doctor if you are having problems. It's also a good idea to know your test results and keep a list of the medicines you take. How can you care for yourself at home? · If your doctor prescribed antibiotics, take them as directed. Do not stop taking them just because you feel better. You need to take the full course of antibiotics. · Gargle with warm salt water once an hour to help reduce swelling and relieve discomfort. Use 1 teaspoon of salt mixed in 1 cup of warm water. · Take an over-the-counter pain medicine, such as acetaminophen (Tylenol), ibuprofen (Advil, Motrin), or naproxen (Aleve). Read and follow all instructions on the label. · Be careful when taking over-the-counter cold or flu medicines and Tylenol at the same time. Many of these medicines have acetaminophen, which is Tylenol. Read the labels to make sure that you are not taking more than the recommended dose. Too much acetaminophen (Tylenol) can be harmful. · Drink plenty of fluids. Fluids may help soothe an irritated throat. Hot fluids, such as tea or soup, may help decrease throat pain. · Use over-the-counter throat lozenges to soothe pain. Regular cough drops or hard candy may also help. These should not be given to young children because of the risk of choking. · Do not smoke or allow others to smoke around you. If you need help quitting, talk to your doctor about stop-smoking programs and medicines.  These can increase your chances of quitting for good. · Use a vaporizer or humidifier to add moisture to your bedroom. Follow the directions for cleaning the machine. When should you call for help? Call your doctor now or seek immediate medical care if:  · You have new or worse trouble swallowing. · Your sore throat gets much worse on one side. Watch closely for changes in your health, and be sure to contact your doctor if you do not get better as expected. Where can you learn more? Go to http://na-tavia.info/  Enter U420 in the search box to learn more about \"Sore Throat: Care Instructions. \"  Current as of: July 29, 2019               Content Version: 12.5  © 9818-6006 [employer]wise, Incorporated. Care instructions adapted under license by [employer] (which disclaims liability or warranty for this information). If you have questions about a medical condition or this instruction, always ask your healthcare professional. [address] any warranty or liability for your use of this information. moderate adenopathy in the retroperitoneum and periceliac region.  Evidence of PE.    · 5/14/2020: CT angiogram of the chest with contrast: Bilateral segmental pulmonary emboli without definitive evidence of right heart strain.  Small nodule along the left major fissure consistent with a intrapulmonary lymph node.  Redemonstration of lower esophageal/gastric mass with surrounding lymphadenopathy and a hypoattenuating lesion in the right hepatic lobe.      · 5/18/2020: The patient is here for his Initial Consultation  appointment with Dr. Peters from Dr. Pettit. He has had symptoms for about 2 months. The patient denies a lot of pain. The patient has lost at least 20lbs since the symptoms started. He denies any heartburn and has been taking Zantac BID to help prevent developing it. He will d/c that and start taking Pepcid. He says that he gets SOB easily with exertion. The patient denies any past medical problems except Lupus and high blood pressure. The patient stated he is supposed to fly out on a trip to [place] tomorrow until Saturday for a golf trip. The patient is being given Eliquis samples 10mg BID for one week and then 5mg BID after that.   · 5/18/2020: Patient started Eliquis.    · 5/26/2020: PET CT scan: There is metabolic activity around the area of the GE junction with thickening of the wall of the bowel in this area.  Maximum SUV 4.6.  No areas of abnormal metabolic activity are seen within the abdomen or pelvis.  There are gallstones within the gallbladder without inflammation.  · 5/28/2020 CEA 13.3, d-dimer 3.28 high, ferritin 104.7, iron 31 low, iron saturation 9% low, TIBC 359,  · 5/29/2020: MRI abdomen with and without contrast: Hemangioma within the right lobe of the liver measuring 2.1 cm.  Stable appearance of multiple retroperitoneal lymph nodes, some of which are considered pathological based on size.  They would be suspicious for metastasis.  Nodular thickening and enhancement of the  distal esophagus and fundus of the stomach.    · 6/9/2020: Endoscopic ultrasound by Dr. Singleton.:A large GE junction mass starting at 36 cm and traversing into the stomach/cardia.  The majority of this has broken into or through the muscularis propria and there is a location where there is a loss of plane between the liver and the mass suggestive of possible invasion of the hepatic capsule making this a T4 lesion.  At least 4 lymph nodes in the celiac axis were visualized with the largest measuring 1.7 cm.  They are round and hypoechoic suggestive of underlying malignancy.  These cannot be biopsied as they were directly behind the mass.   Normal pancreatic uncinate head, neck body and tail.  Normal ampulla and common bile duct.  · 6/15/2020: WBC 7.8, hemoglobin 13.1, platelets 345, MCV 84.2, creatinine 0.83,  · 6/30/2020: Patient received cycle 1 of FLOT, WBC 6.1, hemoglobin 12.8, platelets 286, creatinine 0.86, LFTs normal, CA-19-9 628.5 high, CEA 15.1,  · 7/7/2020: WBC 3.88, hemoglobin 13, platelets 229  · 7/14/2020: Patient received cycle 2 of FLOT  · 7/27/2020 WBC 2.44, hemoglobin 12.5, platelets 191, creatinine 0.7  · 7/30/2020: Patient received a cycle 3 of FLOT.  WBC 2.7, hemoglobin 11.8, platelets 198, creatinine 0.78  · 8/13/2020: Patient underwent diagnostic laparoscopy and peritoneal washings at  with Dr. Solo.  · 8/13/2020: GE junction mass biopsy: Adenocarcinoma with mucinous features and focal signet ring cells.  · 8/13/2020: Ascites fluid: No malignant cells noted.  However peritoneal washings are positive for rare malignant cells.  · 8/20/2020: Patient received cycle 4 of FLOT.  WBC 11.2, hemoglobin 12.3, platelets 280, creatinine 1, CA-19-9 207.5 high, CEA 4.4,  · 8/27/2020: WBC 5.1, hemoglobin 11.5, platelet count 166  · 9/3/2020: Patient received cycle 5 of FLOT.  WBC 2.1, hemoglobin 11.8, platelets 171, creatinine 0.8,  · 19 2020: WBC 2.56, hemoglobin 11.9, platelets 162, ANC  960,  · 9/17/2020: Cycle 6 chemotherapy held due to neutropenia with a WBC 1.5,, ,  · 9/22/2020  cycle 6 of FLOT.  WBC 7.4, hemoglobin 11.9, platelets 236, MCV 87.2, LFTs normal,  · 10/19/2020: CT chest abdomen and pelvis with contrast: Abnormal masslike concentric thickening of the lower third of the esophagus, extending across the GE junction into the stomach consistent with malignancy.  Degree of involvement of the lower esophagus has increased.  There now appears to be invasion of the adjacent diaphragmatic crura, increased contiguous metastatic extension posterior to the pancreas and increased involvement of the gastrohepatic ligament..  Previously seen PE has resolved.  Approximately 2.4 cm low-density lesion in right hepatic lobe consistent with hemangioma.  Retroperitoneal adenopathy in the midabdomen is again noted.  The mass also appears to directly invade the left adrenal gland.  ·   Treatment Site Ref. ID Energy Dose/Fx (cGy) #Fx Dose Correction (cGy) Total Dose (cGy) Start Date End Date Elapsed Days   Abdomen Abdomen 10X/18X 250 14 / 14 0 3,500 10/27/2020 11/17/2020 21     · 12/8/2020 patient received cycle 1 of Taxol and ramucirumab. More than detention into his infusion for ramucirumab patient developed hypersensitivity reaction with discoloration involving his face arms and chest area associated with itching.    · 12/16/2020 patient received day 8 of paclitaxel 80 mg per metered square.  WBC 1.45, hemoglobin 11.4, platelets 147          Subjective:   In the interim patient had a received cycle 1 of Taxol and ramucirumab.  He did develop a reaction with the cycle 1 day 1.  It did resolve with supportive care.  He continues to have weight loss.  Unfortunately gastroenterology could not place the feeding tube because of the extent of the gastric cancer.  He is going to see a surgeon and have a PEG tube placed.  He is requesting for pain medication.  Also has some intolerance with oral  iron.      The following portions of the patient's history were reviewed and updated as appropriate: allergies, current medications, past family history, past medical history, past social history, past surgical history and problem list.    Past Medical History:   Diagnosis Date   • Esophageal cancer (CMS/HCC)     esophageal    • GERD (gastroesophageal reflux disease)    • Hypertension    • Lupus (CMS/HCC)     skin     Past Surgical History:   Procedure Laterality Date   • ENDOSCOPY     • REPLACEMENT TOTAL KNEE Left 04/2019   • UPPER ENDOSCOPIC ULTRASOUND W/ FNA N/A 6/9/2020    Procedure: Endoscopic ULTRASOUND;  Surgeon: Uriel Singleton MD;  Location: [facility] ENDOSCOPY;  Service: Gastroenterology;  Laterality: N/A;  Post operative diagnosis: mass, lymph nodes   • VENOUS ACCESS DEVICE (PORT) INSERTION Left 6/15/2020    Procedure: INSERTION VENOUS ACCESS DEVICE LEFT SUBCLAVIAN UNDER FLOUROSCOPIC GUIDANCE;  Surgeon: Tyree Adkins MD;  Location: [facility] MAIN OR;  Service: Cardiothoracic;  Laterality: Left;       Current Outpatient Medications:   •  apixaban (ELIQUIS) 5 MG tablet tablet, Take 1 tablet by mouth Every 12 (Twelve) Hours. (Patient taking differently: Take 5 mg by mouth Daily.), Disp: 60 tablet, Rfl: 3  •  famotidine (PEPCID) 20 MG tablet, Take 40 mg by mouth Daily., Disp: , Rfl:   •  ferrous gluconate (FERGON) 324 MG tablet, Take 1 tablet by mouth 2 (Two) Times a Day., Disp: 60 tablet, Rfl: 11  •  ferrous sulfate 325 (65 FE) MG tablet, Take 325 mg by mouth Daily., Disp: , Rfl:   •  HYDROcodone-acetaminophen (NORCO) 5-325 MG per tablet, Take 1 tablet by mouth Every 6 (Six) Hours As Needed., Disp: , Rfl:   •  LORazepam (Ativan) 0.5 MG tablet, Take 1 tablet by mouth Every 6 (Six) Hours As Needed (Chemotherapy-induced nausea and vomiting)., Disp: 20 tablet, Rfl: 0  •  metoclopramide (REGLAN) 5 MG tablet, Take 2 tablets by mouth 2 (two) times a day., Disp: 60 tablet, Rfl: 3  •  Multiple Vitamins-Minerals  (MULTIVITAMIN ADULT PO), Take  by mouth., Disp: , Rfl:   •  ondansetron (ZOFRAN) 8 MG tablet, Take 1 tablet by mouth 3 (Three) Times a Day As Needed for Nausea or Vomiting., Disp: 30 tablet, Rfl: 5  •  potassium chloride (K-DUR,KLOR-CON) 10 MEQ CR tablet, Take 3 tablets by mouth Daily for 7 days., Disp: 21 tablet, Rfl: 0  •  sucralfate (CARAFATE) 1 GM/10ML suspension, Take 10 mL by mouth 4 (Four) Times a Day With Meals & at Bedtime., Disp: 1200 mL, Rfl: 5    No Known Allergies    Family History   Problem Relation Age of Onset   • Leukemia Mother         CLL     Cancer-related family history is not on file.    Social History     Tobacco Use   • Smoking status: Never Smoker   • Smokeless tobacco: Never Used   Substance Use Topics   • Alcohol use: Yes     Frequency: 2-4 times a month     Comment: Socially   • Drug use: Never     Social History     Social History Narrative    Patient lives in [place], [place], with his wife. He has one child. He does not smoke. He works at [employer].      ROS:     Review of Systems   Constitutional: Positive for fatigue and unexpected weight change. Negative for activity change, chills and fever.   HENT: Negative for drooling, ear discharge, mouth sores, nosebleeds and sore throat.    Eyes: Negative for photophobia, pain, redness and visual disturbance.   Respiratory: Negative for cough, choking, shortness of breath and wheezing.    Cardiovascular: Negative for chest pain and palpitations.   Gastrointestinal: Positive for abdominal pain. Negative for diarrhea, nausea and vomiting.        .  Weight loss.  Reflux, regurgitation, dysphagia   Genitourinary: Negative for difficulty urinating, dysuria and urgency.   Musculoskeletal: Negative for neck stiffness.   Skin: Positive for rash.   Neurological: Negative for tremors, seizures, syncope and speech difficulty.        Reports right foot drop.   Psychiatric/Behavioral: Negative for agitation, confusion and hallucinations.     I have reviewed and  "confirmed the accuracy of the patient's history which includes chief complaint, subjective, HPI, ROS,as entered by the MA/LPN/RN.    Objective:    Vitals:    12/22/20 1420   BP: 96/56   Resp: 18   Temp: 97.1 °F (36.2 °C)   TempSrc: Temporal   Weight: 55.8 kg (123 lb)   Height: 177.8 cm (70\")   PainSc:   8   PainLoc: Comment: back     Body mass index is 17.65 kg/m².  ECOG  (0) Fully active, able to carry on all predisease performance without restriction    Physical Exam:     Physical Exam   Constitutional: He is oriented to person, place, and time. He appears well-developed. He appears ill. No distress.   HENT:   Head: Normocephalic and atraumatic.   Eyes: Conjunctivae are normal. Right eye exhibits no discharge. Left eye exhibits no discharge. No scleral icterus.   Neck: Normal range of motion. Neck supple. No thyromegaly present.   Cardiovascular: Normal rate, regular rhythm and normal heart sounds. Exam reveals no gallop and no friction rub.   Pulmonary/Chest: Effort normal and breath sounds normal. No stridor. No respiratory distress. He has no wheezes.   Port    Abdominal: Soft. Normal appearance and bowel sounds are normal. He exhibits no mass. There is no abdominal tenderness. There is no rebound and no guarding.   Appears malnourished   Musculoskeletal: Normal range of motion. No swelling or tenderness.   Lymphadenopathy:     He has no cervical adenopathy.   Neurological: He is alert and oriented to person, place, and time. He exhibits normal muscle tone.   Right foot drop   Skin: Skin is warm. No rash noted. He is not diaphoretic. No erythema.   Diffuse erythematous skin reaction involving the face, anterior chest, upper extremities itchy.   Psychiatric: His behavior is normal. Mood and thought content normal.   Nursing note and vitals reviewed.    I have reviewed and confirmed the accuracy of the patient's history: Chief complaint, HPI, ROS and Subjective as entered by the MA/LPN/RN. Jackson Peters MD " 12/22/20     Lab Results - Last 18 Months   Lab Units 12/16/20  0809 12/08/20  1130 09/22/20  0819   WBC 10*3/mm3 1.45* 6.23 7.51   HEMOGLOBIN g/dL 11.4* 12.2* 11.9*   HEMATOCRIT % 32.8* 35.5* 34.6*   PLATELETS 10*3/mm3 147 195 236   MCV fL 88.6 89.2 87.2     Lab Results - Last 18 Months   Lab Units 12/08/20  1130 09/22/20  0819 09/17/20  0808   SODIUM mmol/L 134* 138 137   POTASSIUM mmol/L 3.3* 4.3 4.1   CHLORIDE mmol/L 98 103 103   CO2 mmol/L 25.0 25.0 21.0*   BUN mg/dL 13 12 12   CREATININE mg/dL 0.91 0.80 0.80   CALCIUM mg/dL 9.3 9.1 9.1   BILIRUBIN mg/dL 0.5 0.4 0.4   ALK PHOS U/L 97 83 85   ALT (SGPT) U/L 10 22 20   AST (SGOT) U/L 21 28 29   GLUCOSE mg/dL 180* 170* 170*     Lab Results   Component Value Date    GLUCOSE 180 (H) 12/08/2020    BUN 13 12/08/2020    CREATININE 0.91 12/08/2020    EGFRIFNONA 85 12/08/2020    BCR 14.3 12/08/2020    K 3.3 (L) 12/08/2020    CO2 25.0 12/08/2020    CALCIUM 9.3 12/08/2020    ALBUMIN 3.50 12/08/2020    LABIL2 1.3 07/27/2020    AST 21 12/08/2020    ALT 10 12/08/2020       Lab Results - Last 18 Months   Lab Units 08/13/20  0623 07/27/20  1125   INR INR 1.2 1.0   APTT s  --  31.5     Lab Results   Component Value Date    IRON 31 (L) 05/28/2020    TIBC 359 05/28/2020    FERRITIN 104.70 05/28/2020     No results found for: FOLATE    No results found for: OCCULTBLD    No results found for: RETICCTPCT    No results found for: AEAFWIND08  No results found for: SPEP, UPEP  No results found for: LDH, URICACID  No results found for: ALFREDA, RF, SEDRATE  No results found for: FIBRINOGEN, HAPTOGLOBIN  Lab Results   Component Value Date    PTT 31.5 07/27/2020    INR 1.2 08/13/2020     No results found for:   Lab Results   Component Value Date    CEA 4.44 08/20/2020     No components found for: CA-19-9  No results found for: PSA    Assessment/Plan     Assessment:  1. 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  He does have retroperitoneal lymph node involvement as well as celiac lymph node metastases.  Mass causing liver capsule invasion.  Patient presented with dysphagia and had 30 pound weight loss.  Started palliative chemotherapy with  FLOT.  Patient did experience neuropathy from the chemotherapy..  Patient had laparoscopy and peritoneal lavage that showed some malignant cells.  Patient finished her 6 cycles as of 9/22/2020.  Unfortunately restaging CT scan on 10/19/2020 shows evidence of disease progression.  Patient started having upper GI obstructive symptoms due to tumor invasion, and also was having pain symptoms due to tumor invading towards pancreas, diaphragm, left adrenal etc. Patient received palliative radiation therapy due to significant tumor mass causing local symptoms.  2. Regurgitation/mild dysphagia: Due to symptoms of tumor obstructing the upper GI tract.  3. Weight loss: Due to cancer related cachexia/anorexia.  Also early satiety  4. Pulmonary embolism:  on Eliquis .  Thrombosis very likely secondary to malignancy.   5. Anemia/leukopenia: Due to myelosuppression from chemotherapy  6. Iron deficiency  7. Right foot drop: Unclear if this is due to chemotherapy.  Does not have neuropathy in other extremities.  Patient saw orthopedics.  They have recommended a splint.  8. Cutaneous reaction to ramucirumab: Responded to steroids, H2 blockers and Benadryl      Plan:        1. Patient to proceed with cycle 2 of Taxol and ramucirumab  2. Abdominal pain: Start Norco 5 every 6 hours as needed  3. Patient to have a evaluation with general surgery for J-tube placement for nutrition.  Patient has upper GI obstructive symptoms due to tumor invasion.  4. Persistent anemia: Intolerance to oral iron: Administer IV iron Injectafer 750x2.  Will consider starting Retacrit if her hemoglobin continues to drop.  5. Continue Eliquis  6. Foot drop continue ankle splint  7. Monitor CEA and CA  19-9  8. Follow-up in 4 weeks        I have reviewed and confirmed the accuracy of the patient's history: Chief complaint, HPI, ROS and Subjective as entered by the MA/LPN/RN. Jackson Peters MD 12/22/20            .    Electronically signed by Jackson Peters MD, 12/22/20, 3:17 PM EST.

## 2020-12-15 NOTE — TELEPHONE ENCOUNTER
Katalina with [employer] is  calling to confirm receipt of short term disability forms.    They're waiting on this document to start paying off the pt's benefits.     If/when we call we just need to reference the claim number, the claim number starts with 00.    Open Mon-Fri 5am to 5p David Orourke  1960

## 2020-12-16 NOTE — TELEPHONE ENCOUNTER
----- Message from Jackson Peters MD sent at 12/16/2020  1:31 PM EST -----  Potassium chloride 30 mEq daily for 1 week

## 2020-12-16 NOTE — PROGRESS NOTES
Case Management/ Note    Patient Name: Tyree Orourke  YOB: 1960  MRN #: [id number]    OSW completed patient's ST disability paperwork. OSW met with patient who is alert and oriented to person, place and time. He needed to sign release of information for the ST disability form. We also discussed him calling [employer] to begin the SSD application. Phone number for [employer] given and answered patient questions. Basic needs are met and he said he had enough money to cover basic needs while he waits for his ST disability. OSW will remain available.     Electronically signed by:   Silvana Huynh LCSW, OSW-C  12/16/20, 10:52 EST

## 2020-12-16 NOTE — PROGRESS NOTES
Her at clinic for C1d8 Taxol.  He reports that he is on a full liquid diet since Dec 14th d/t obstruction from the tumor seen on EGD. He is able to tolerate the diet ok, but it does not taste well. He feels a little more weak today which he believes is d/t the diet change. I advised him to monitor his weight closely.  He has completed radiation therapy as planned. Port accessed and flushed with normal saline. Good blood return noted. Lab showed the WBC and ANC was low but within treatment parameters. Advised him to follow neutropenic precautions. He v/u.

## 2020-12-16 NOTE — TELEPHONE ENCOUNTER
Spoke to the patient and informed him potassium was low and we will eRX 30 meq daily x 1 week per MD. The patient verbalized understanding. RX pended

## 2020-12-18 NOTE — TELEPHONE ENCOUNTER
Caller: Tyree    Relationship to patient: Pt    Best call back number: [phone]    Type of visit: Infusion    Requested date: Week of 12/21     Additional notes: Pt says he has 3 weeks of infusion then 1 week off. This would be his third week on.

## 2020-12-20 NOTE — PROGRESS NOTES
FOLLOW-UP NOTE    Name: Tyree Orourke  YOB: 1960  MRN #: [id number]  Date of Service: 12/16/2020  Primary Care Provider: Glenys Brewster MD    DIAGNOSIS:   1. Gastric carcinoma (CMS/HCC)      REASON FOR VISIT: 1 month post-palliative XRT follow-up    RADIATION TREATMENT COURSE: 35 Gy in 14 fractions, completed 11/17/2020    HISTORY OF PRESENT ILLNESS: The patient is a 60 y.o. year old male who has had improvement in pain but continues to have issues with swallowing, dysphagia. He has seen [facility] and was not able to have feeding tube placed. He is being set-up for J-tube.    He is seen during his infusion today, as he has started new therapy for his progressive gastric carcinoma, poorly differentiated signet ring gastric carcninoma after failing 6 cycles of FLOT.  He is now on Paclitaxel and Cyramza.    He denies any acute bleeding or dark stools.    The following portions of the patient's history were reviewed and updated as appropriate: allergies, current medications, past family history, past medical history, past social history, past surgical history and problem list. Reviewed with the patient and remain unchanged.    PAST MEDICAL HISTORY:  he  has a past medical history of Esophageal cancer (CMS/HCC), GERD (gastroesophageal reflux disease), Hypertension, and Lupus (CMS/HCC).  MEDICATIONS:   Current Outpatient Medications:   •  apixaban (ELIQUIS) 5 MG tablet tablet, Take 1 tablet by mouth Every 12 (Twelve) Hours. (Patient taking differently: Take 5 mg by mouth Daily.), Disp: 60 tablet, Rfl: 3  •  famotidine (PEPCID) 20 MG tablet, Take 40 mg by mouth Daily., Disp: , Rfl:   •  ferrous gluconate (FERGON) 324 MG tablet, Take 1 tablet by mouth 2 (Two) Times a Day., Disp: 60 tablet, Rfl: 11  •  ferrous sulfate 325 (65 FE) MG tablet, Take 325 mg by mouth Daily., Disp: , Rfl:   •  LORazepam (Ativan) 0.5 MG tablet, Take 1 tablet by mouth Every 6 (Six) Hours As Needed (Chemotherapy-induced nausea and  vomiting)., Disp: 20 tablet, Rfl: 0  •  metoclopramide (REGLAN) 5 MG tablet, Take 2 tablets by mouth 2 (two) times a day., Disp: 60 tablet, Rfl: 3  •  Multiple Vitamins-Minerals (MULTIVITAMIN ADULT PO), Take  by mouth., Disp: , Rfl:   •  ondansetron (ZOFRAN) 8 MG tablet, Take 1 tablet by mouth 3 (Three) Times a Day As Needed for Nausea or Vomiting., Disp: 30 tablet, Rfl: 5  •  potassium chloride (K-DUR,KLOR-CON) 10 MEQ CR tablet, Take 3 tablets by mouth Daily for 7 days., Disp: 21 tablet, Rfl: 0  •  sucralfate (CARAFATE) 1 GM/10ML suspension, Take 10 mL by mouth 4 (Four) Times a Day With Meals & at Bedtime., Disp: 1200 mL, Rfl: 5  ALLERGIES: No Known Allergies  PAST SURGICAL HISTORY: he has a past surgical history that includes Replacement total knee (Left, 04/2019); Esophagogastroduodenoscopy; Upper endoscopic ultrasound w/ FNA (N/A, 6/9/2020); and Venous Access Device (Port) (Left, 6/15/2020).  PREVIOUS RADIOTHERAPY OR CHEMOTHERAPY: yes  FAMILY HISTORY: his family history includes Leukemia in his mother.  SOCIAL HISTORY: he  reports that he has never smoked. He has never used smokeless tobacco. He reports current alcohol use. He reports that he does not use drugs.  PAIN AND PAIN MANAGEMENT: Denies pain.  NUTRITIONAL STATUS:    Getting J-tube, oral supplements now  KPS: 70    Review of Systems:   General: No fevers, chills, weight change, or drenching night sweats. Skin: No rashes or jaundice.  HEENT: No change in vision or hearing, no headaches.  Neck: No dysphagia or masses.  Heme/Lymph: No easy bruising or bleeding.  Respiratory System: No shortness of breath or cough.  Cardiovascular: No chest pain, palpitations, or dyspnea on exertion.  - Pacemaker. GI: As noted above.  : No dysuria or hematuria.  Endocrine: No heat or cold intolerance. Musculoskeletal: No myalgias or arthralgias.  Neuro: No weakness, numbness, syncope, or seizures. Psych: No mood changes or depression. Ext: Denies swelling.        Objective      Vitals: Vitals from his chemo log reviewed and good.    PHYSICAL EXAM:  GENERAL: in no apparent distress, sitting comfortably in room.    HEENT: normocephalic, atraumatic. Pupils are equal, round, reactive to light. Sclera anicteric. Conjunctiva not injected. Oropharynx without erythema, ulcerations or thrush.   NECK: Supple with no masses.  LYMPHATIC: no cervical, supraclavicular or axillary adenopathy appreciated bilaterally.   CARDIOVASCULAR: S1 & S2 detected; no murmurs, rubs or gallops.  CHEST: clear to auscultation bilaterally; no wheezes, crackles or rubs. Work of breathing normal.  ABDOMEN: bowel sounds present. Abdomen is soft, nontender, nondistended.   MUSCULOSKELETAL: no tenderness to palpation along the spine or scapulae. Normal range of motion.  EXTREMITIES: no clubbing, cyanosis, edema.  SKIN: no erythema, rashes, ulcerations noted.   NEUROLOGIC: cranial nerves II-XII grossly intact bilaterally. No focal neurologic deficits.  PSYCHIATRIC:  alert, aware, and appropriate.  .    PERTINENT IMAGING/PATHOLOGY/LABS (Medical Decision Making):     COORDINATION OF CARE: A copy of this note is sent to the referring provider.    PATHOLOGY (Reviewed):     IMAGING (Reviewed):     LABS (Reviewed):  Hematology WBC   Date Value Ref Range Status   12/16/2020 1.45 (C) 3.40 - 10.80 10*3/mm3 Final   07/27/2020 2.44 (L) 4.5 - 11.0 10*3/uL Final     RBC   Date Value Ref Range Status   12/16/2020 3.70 (L) 4.14 - 5.80 10*6/mm3 Final   07/27/2020 4.49 (L) 4.5 - 5.9 10*6/uL Final     Hemoglobin   Date Value Ref Range Status   12/16/2020 11.4 (L) 13.0 - 17.7 g/dL Final   07/27/2020 12.5 (L) 13.5 - 17.5 g/dL Final     Hematocrit   Date Value Ref Range Status   12/16/2020 32.8 (L) 37.5 - 51.0 % Final   07/27/2020 37.4 (L) 41.0 - 53.0 % Final     Platelets   Date Value Ref Range Status   12/16/2020 147 140 - 450 10*3/mm3 Final   07/27/2020 191 140 - 440 10*3/uL Final      Chemistry   Lab Results   Component Value Date     GLUCOSE 180 (H) 12/08/2020    BUN 13 12/08/2020    CREATININE 0.91 12/08/2020    EGFRIFNONA 85 12/08/2020    BCR 14.3 12/08/2020    K 3.3 (L) 12/08/2020    CO2 25.0 12/08/2020    CALCIUM 9.3 12/08/2020    ALBUMIN 3.50 12/08/2020    LABIL2 1.3 07/27/2020    AST 21 12/08/2020    ALT 10 12/08/2020         Assessment/Plan     ASSESSMENT AND PLAN:    1. Gastric carcinoma (CMS/HCC)       -Palliative XRT course tolerated ok  No diarrhea  No skin issues    Reviewed EGD and [facility] note; being set-up for J tube.    Continues on new systemic therapy.    This assessment comes from my review of the imaging, pathology, physician notes and other pertinent information as mentioned.    DISPOSITION: 3 month f/u; I can see at time of infusion or earlier as needed.      CC: MD Chapo Olvera MD  12/20/2020  10:52 AM EST

## 2020-12-21 NOTE — TELEPHONE ENCOUNTER
Case Management/ Note    Patient Name: Tyree Orourke  YOB: 1960  MRN #: [id number]    OSW received a message with patient inquiring about his short term disability paperwork. OSW called patient and explained the paperwork was given to him on the 16th. Initially, he did not recall getting paperwork but then said he would look for it. He said if he cannot find the paperwork then he will give another blank copy to MARIAJOSE Witt when he sees Dr. Peters in [place] tomorrow. Claribel, practice manager informed of the above.             Electronically signed by:   Silvana Huynh LCSW, OSW-C  12/21/20, 14:48 EST

## 2020-12-21 NOTE — TELEPHONE ENCOUNTER
PT: SELF    PT CALLING TO CHECK THE STATUS OF HIS SHORT TERM DISABILITY PAPER WORK. WOULD LIKE TO SPEAK TO DOUGLAS. PLEASE ADVISE    PT#: [phone]

## 2020-12-22 PROBLEM — D50.0 IRON DEFICIENCY ANEMIA DUE TO CHRONIC BLOOD LOSS: Status: ACTIVE | Noted: 2020-01-01

## 2020-12-22 PROBLEM — K90.9 MALABSORPTION: Status: ACTIVE | Noted: 2020-01-01

## 2020-12-22 NOTE — TELEPHONE ENCOUNTER
Case Management/ Note    Patient Name: Tyree Orourke  YOB: 1960  MRN #: [id number]    OSW completed short term disability paperwork as patient misplaced the completed copy he was given. This was successfully faxed to [employer] and record sent to medical record for scanning.     Electronically signed by:   Silvana Huynh LCSW, OSW-C  12/22/20, 16:53 EST

## 2020-12-23 NOTE — PATIENT INSTRUCTIONS
Neutropenia  Neutropenia is a condition that occurs when you have a lower-than-normal level of a type of white blood cell (neutrophil) in your body. Neutrophils are made in the spongy center of large bones (bone marrow), and they fight infections.  Neutrophils are your body's main defense against bacterial and fungal infections. The fewer neutrophils you have and the longer your body remains without them, the greater your risk of getting a severe infection.  What are the causes?  This condition can occur if your body uses up or destroys neutrophils faster than your bone marrow can make them. Neutropenia may be caused by:  · A bacterial or fungal infection.  · Allergic disorders.  · Reactions to some medicines.  · An autoimmune disease.  · An enlarged spleen.  This condition can also occur if your bone marrow does not produce enough neutrophils. This problem may be caused by:  · Cancer.  · Cancer treatments, such as radiation or chemotherapy.  · Viral infections.  · Medicines, such as phenytoin.  · Vitamin B12 deficiency.  · Diseases of the bone marrow.  · Environmental toxins, such as insecticides.  What are the signs or symptoms?  This condition does not usually cause symptoms. If symptoms are present, they are usually caused by an underlying infection. Symptoms of an infection may include:  · Fever.  · Chills.  · Swollen glands.  · Oral or anal ulcers.  · Cough and shortness of breath.  · Rash.  · Skin infection.  · Fatigue.  How is this diagnosed?  Your health care provider may suspect neutropenia if you have:  · A condition that may cause neutropenia.  · Symptoms during or after treatment for cancer.  · Symptoms of infection, especially fever.  · Frequent and unusual infections.  This condition is diagnosed based on your medical history and a physical exam. Tests will also be done, such as:  · A complete blood count (CBC).  · A procedure to collect a sample of bone marrow for examination (bone marrow  biopsy).  · A chest X-ray.  · A urine culture.  · A blood culture.  How is this treated?  Treatment depends on the underlying cause and severity of your condition. Mild neutropenia may not require treatment. Treatment may include medicines, such as:  · Antibiotic medicine given through an IV.  · Antiviral medicines.  · Antifungal medicines.  · A medicine to increase neutrophil production (colony-stimulating factor). You may get this drug through an IV or by injection.  · Steroids given through an IV.  If an underlying condition is causing neutropenia, you may need treatment for that condition. If medicines or cancer treatments are causing neutropenia, your health care provider may have you stop the medicines or treatment.  Follow these instructions at home:  Medicines    · Take over-the-counter and prescription medicines only as told by your health care provider.  · Get a seasonal flu shot (influenza vaccine).  · Avoid people who received a vaccine in the past 30 days if that vaccine contained a live version of the germ (live vaccine). You should not get a live vaccine. Common live vaccines are polio, MMR, chicken pox, and shingles vaccines.  Eating and drinking  · Do not share food utensils.  · Do not eat unpasteurized foods.  · Do not eat raw or undercooked meat, eggs, or seafood.  · Do not eat unwashed, raw fruits or vegetables.  Lifestyle  · Avoid exposure to groups of people or children.  · Avoid being around people who are sick.  · Avoid being around dirt or dust, such as in construction areas or gardens.  · Do not provide direct care for pets. Avoid animal droppings. Do not clean litter boxes and bird cages.  · Do not have sex unless your health care provider has approved.  Hygiene    · Bathe daily.  · Clean the area between the genitals and the anus (perineal area) after you urinate or have a bowel movement. If you are female, wipe from front to back.  · Brush your teeth with a soft toothbrush before and  after meals.  · Do not use a regular razor. Use an electric razor to remove hair.  · Wash your hands often. Make sure others who come in contact with you also wash their hands. If soap and water are not available, use hand .  General instructions  · Follow any precautions as told by your health care provider to reduce your risk for injury or infection.  · Take actions to avoid cuts and burns. For example:  ? Be cautious when you use knives. Always cut away from yourself.  ? Keep knives in protective sheaths or guards when not in use.  ? Use oven mitts when you cook with a hot stove, oven, or grill.  ? Stand a safe distance away from open fires.  · Do not use tampons, enemas, or rectal suppositories unless your health care provider has approved.  · Keep all follow-up visits as told by your health care provider. This is important.  Contact a health care provider if:  · You have:  ? A sore throat.  ? A warm, red, or tender area on your skin.  ? A cough.  ? Frequent or painful urination.  ? Vaginal discharge or itching.  · You develop:  ? Sores in your mouth or anus.  ? Swollen lymph nodes.  ? Red streaks on the skin.  ? A rash.  Get help right away if:  · You have:  ? A fever.  ? Chills, or you start to shake.  · You feel:  ? Nauseous, or you vomit.  ? Very fatigued.  ? Short of breath.  Summary  · Neutropenia is a condition that occurs when you have a lower-than-normal level of a type of white blood cell (neutrophil) in your body.  · This condition can occur if your body uses up or destroys neutrophils faster than your bone marrow can make them.  · Treatment depends on the underlying cause and severity of your condition. Mild neutropenia may not require treatment.  · Follow any precautions as told by your health care provider to reduce your risk for injury or infection.  This information is not intended to replace advice given to you by your health care provider. Make sure you discuss any questions you have  with your health care provider.  Document Revised: 10/03/2019 Document Reviewed: 10/03/2019  Elsevier Patient Education © 2020 [employer]

## 2020-12-23 NOTE — PROGRESS NOTES
Pt. Here at clinic for C1D15 Cyramza, Taxol,   Pt's BP 76/52 (manual check) WBC 1.05, , pt. States he feels tired/fatigued but, no dizzyness  NP notified of pt's lab results  Hold chemo today, reschedule pt. For possible chemo next week per Fatoumata EVANS  IVF's (1Liter NS) started as ordered per Dr. Peters  Pt. Was educated on neutropenic precautions and handout given. Pt. Verbalized understanding of all orders/instructions

## 2020-12-28 NOTE — PROGRESS NOTES
Faxed nutrition and MD note to [facility].     Karlie Langford, MS,RD,LD-[place],CD-[place]  Registered Dietitian

## 2020-12-28 NOTE — PROGRESS NOTES
"                 Nutrition Assessment  Tyree Orourke    Anthropometrics  Height: 5'10\"  Weight: 128.3#  BMI: 18.4  Weight Hx:   (5/18/20) 197#  (5/26/20) 188.4#  (6/9/20) 180.9#  (6/15/20) 180.7#  (6/30/20) 176.3#  (7/1/20) 176.3#  (7/7/20) 171#  (7/14/20) 171.9#  (8/20/20) 169.9#  (9/17/20) 169.9#  (9/22/20) 168.4#  (10/20/20) 154.1#  (11/18/20) 144.6#  (11/24/20) 136.9#  (12/4/20) 135.5#  IBW: 166# (77.2%)  UBW: 210# (61%)    Discussion: I called the pt as a f/u. Pt continues to lose weight due to an inability to consume adequate intake. Per pt, he has been instructed to maintain a liquid diet until his feeding tube is placed. Pt not able to tolerate milk products and therefore has been relying on Boost Breeze and broth for a majority of his calories. Pt is hopeful he will feel better and have more energy after his feeding tube is placed.     Nutrient Needs:  Calories: 2041 kcals (35 kcals/kg)  Protein: 105 g PRO (1.8g/kg)  Fluid: 1750 ml (30 ml/kg)    Nutrition Prescription:  Bolus Feeds: Isosource 1.5  6 cartons/day + 200 ml water flush TID  Provides: 2250 kcals (110.2%), 102 g PRO (97.1%), 1146 ml free water + 600 ml water flush (99.7%)    Karlie Langford, MS,RD,LD-[place],CD-[place]  Registered Dietitian                    "

## 2020-12-30 NOTE — PROGRESS NOTES
GENERAL SURGERY CONSULTATION NOTE    Consult requested by: Dr. Logan    Patient Care Team:  Glenys Brewster MD as PCP - General (Family Medicine)    Reason for consult: Feeding tube placement    Subjective     Patient is a 60 y.o. male presents with gastroesophageal junction cancer.  The patient was diagnosed with a T4N2 GE junction cancer.  He has undergone chemotherapy x6 rounds and radiation x15 treatments.  He recently restarted a different round of chemotherapy.  Despite chemo and radiation he has had progressive dysphagia which has worsened the point that he can now only swallow liquids.  He has been losing an excessive amount of weight and has lost 90 pounds over the last 9 months.  He was seen by gastroenterology on 12/14/2020 where he underwent a EGD with attempted PEG placement.  Unfortunately, although the patient had very good transillumination at the site of transillumination they were unable to pass a 25-gauge needle due to a significant amount of resistance suggesting tumor at this site.  At this time PEG tube was deferred.  A stent was not attempted.  The patient was referred to me for possible J-tube placement.    Review of Systems   Constitutional: Positive for fatigue and unexpected weight loss. Negative for appetite change, chills and fever.   HENT: Positive for trouble swallowing. Negative for congestion and sore throat.    Respiratory: Positive for cough and choking. Negative for shortness of breath.    Cardiovascular: Negative for chest pain and palpitations.   Gastrointestinal: Positive for GERD. Negative for abdominal pain, constipation, diarrhea, nausea and vomiting.   Genitourinary: Negative for difficulty urinating, dysuria and frequency.   Musculoskeletal: Negative for arthralgias and back pain.   Skin: Negative for rash and skin lesions.   Neurological: Negative for dizziness, seizures and memory problem.   Hematological: Positive for adenopathy. Bruises/bleeds easily.    Psychiatric/Behavioral: Negative for sleep disturbance and depressed mood.        History  Past Medical History:   Diagnosis Date   • Esophageal cancer (CMS/HCC)     esophageal    • GERD (gastroesophageal reflux disease)    • Hypertension    • Lupus (CMS/HCC)     skin     Past Surgical History:   Procedure Laterality Date   • ENDOSCOPY     • REPLACEMENT TOTAL KNEE Left 04/2019   • UPPER ENDOSCOPIC ULTRASOUND W/ FNA N/A 6/9/2020    Procedure: Endoscopic ULTRASOUND;  Surgeon: Uriel Singleton MD;  Location: [facility] ENDOSCOPY;  Service: Gastroenterology;  Laterality: N/A;  Post operative diagnosis: mass, lymph nodes   • VENOUS ACCESS DEVICE (PORT) INSERTION Left 6/15/2020    Procedure: INSERTION VENOUS ACCESS DEVICE LEFT SUBCLAVIAN UNDER FLOUROSCOPIC GUIDANCE;  Surgeon: Tyree Adkins MD;  Location: [facility] MAIN OR;  Service: Cardiothoracic;  Laterality: Left;     Family History   Problem Relation Age of Onset   • Leukemia Mother         CLL     Social History     Tobacco Use   • Smoking status: Never Smoker   • Smokeless tobacco: Never Used   Substance Use Topics   • Alcohol use: Yes     Frequency: 2-4 times a month     Comment: Socially   • Drug use: Never     (Not in a hospital admission)    Allergies:  Patient has no known allergies.    Objective     Vital Signs  Temp:  [98.4 °F (36.9 °C)] 98.4 °F (36.9 °C)  Heart Rate:  [75-86] 75  BP: ()/(62-89) 131/89    Physical Exam  Vitals signs reviewed.   Constitutional:       Appearance: He is cachectic. He is not ill-appearing.   HENT:      Head: Normocephalic and atraumatic.   Eyes:      Pupils: Pupils are equal, round, and reactive to light.   Neck:      Musculoskeletal: Normal range of motion.   Cardiovascular:      Rate and Rhythm: Normal rate and regular rhythm.   Pulmonary:      Effort: Pulmonary effort is normal.      Breath sounds: Normal breath sounds.   Abdominal:      General: There is no distension.      Palpations: Abdomen is soft.      Tenderness:  There is no abdominal tenderness.      Hernia: No hernia is present.   Musculoskeletal: Normal range of motion.   Lymphadenopathy:      Cervical: No cervical adenopathy.   Skin:     General: Skin is warm and dry.      Findings: No rash.   Neurological:      Mental Status: He is alert and oriented to person, place, and time.         Results Review:   Lab Results (last 24 hours)     ** No results found for the last 24 hours. **        No radiology results for the last day      I reviewed the patient's new imaging results and agree with the interpretation.  I reviewed the patient's other test results and agree with the interpretation    Assessment/Plan     Active Problems:  Dysphagia due to large gastroesophageal tumor    I discussed with the patient that I typically do not like to place J-tube's as I find that they often cause more problems than they help fix.  The problem with J-tubes are due to their small diameter they easily clog, and when they become dislodged replacement becomes quite difficult.  However, given his extreme weight loss and need for continued nutritional supplementation, I think a feeding tube placement will ultimately help this gentleman in the long run.  We discussed the possibility of placing a G-tube or a J-tube.  We discussed the risk, benefits, and alternatives to both.  The risks include bleeding, infection, malposition, bowel obstruction, pain, dislodgment resulting in peritoneal sepsis, and need for multiple surgeries to revise these tubes as issues arise in the future.  The patient understands, and agrees to proceed.  In my opinion, we will attempt to place a G-tube first, and if the patient's stomach is truly completely involved with cancer, then we will proceed with a J-tube.    I discussed the patients findings and my recommendations with the patient who agrees.     Chapo Bundy MD  12/30/20  14:13 EST

## 2021-01-01 ENCOUNTER — ANESTHESIA EVENT (OUTPATIENT)
Dept: PERIOP | Facility: HOSPITAL | Age: 61
End: 2021-01-01

## 2021-01-01 ENCOUNTER — TELEPHONE (OUTPATIENT)
Dept: ONCOLOGY | Facility: CLINIC | Age: 61
End: 2021-01-01

## 2021-01-01 ENCOUNTER — TELEPHONE (OUTPATIENT)
Dept: ONCOLOGY | Facility: HOSPITAL | Age: 61
End: 2021-01-01

## 2021-01-01 ENCOUNTER — LAB (OUTPATIENT)
Dept: LAB | Facility: HOSPITAL | Age: 61
End: 2021-01-01

## 2021-01-01 ENCOUNTER — HOSPITAL ENCOUNTER (OUTPATIENT)
Dept: GENERAL RADIOLOGY | Facility: HOSPITAL | Age: 61
Discharge: HOME | End: 2021-01-11

## 2021-01-01 ENCOUNTER — READMISSION MANAGEMENT (OUTPATIENT)
Dept: CALL CENTER | Facility: HOSPITAL | Age: 61
End: 2021-01-01

## 2021-01-01 ENCOUNTER — APPOINTMENT (OUTPATIENT)
Dept: ONCOLOGY | Facility: HOSPITAL | Age: 61
End: 2021-01-01

## 2021-01-01 ENCOUNTER — HOSPITAL ENCOUNTER (OUTPATIENT)
Dept: CARDIOLOGY | Facility: HOSPITAL | Age: 61
Discharge: HOME | End: 2021-01-11

## 2021-01-01 ENCOUNTER — HOSPITAL ENCOUNTER (INPATIENT)
Facility: HOSPITAL | Age: 61
LOS: 2 days | End: 2021-01-30
Attending: INTERNAL MEDICINE | Admitting: HOSPITALIST

## 2021-01-01 ENCOUNTER — APPOINTMENT (OUTPATIENT)
Dept: GENERAL RADIOLOGY | Facility: HOSPITAL | Age: 61
End: 2021-01-01

## 2021-01-01 ENCOUNTER — HOSPITAL ENCOUNTER (OUTPATIENT)
Dept: ONCOLOGY | Facility: HOSPITAL | Age: 61
Setting detail: INFUSION SERIES
Discharge: HOME | End: 2021-01-13

## 2021-01-01 ENCOUNTER — ANESTHESIA (OUTPATIENT)
Dept: PERIOP | Facility: HOSPITAL | Age: 61
End: 2021-01-01

## 2021-01-01 VITALS
BODY MASS INDEX: 17.22 KG/M2 | TEMPERATURE: 97.8 F | HEART RATE: 82 BPM | DIASTOLIC BLOOD PRESSURE: 93 MMHG | WEIGHT: 120 LBS | RESPIRATION RATE: 18 BRPM | SYSTOLIC BLOOD PRESSURE: 129 MMHG

## 2021-01-01 VITALS
WEIGHT: 107.58 LBS | SYSTOLIC BLOOD PRESSURE: 148 MMHG | DIASTOLIC BLOOD PRESSURE: 80 MMHG | HEART RATE: 148 BPM | TEMPERATURE: 98.6 F | OXYGEN SATURATION: 100 % | BODY MASS INDEX: 15.4 KG/M2 | RESPIRATION RATE: 28 BRPM | HEIGHT: 70 IN

## 2021-01-01 DIAGNOSIS — D50.0 IRON DEFICIENCY ANEMIA DUE TO CHRONIC BLOOD LOSS: ICD-10-CM

## 2021-01-01 DIAGNOSIS — Z45.2 ENCOUNTER FOR FITTING AND ADJUSTMENT OF VASCULAR CATHETER: ICD-10-CM

## 2021-01-01 DIAGNOSIS — C15.9 MALIGNANT NEOPLASM OF ESOPHAGUS, UNSPECIFIED LOCATION (HCC): ICD-10-CM

## 2021-01-01 DIAGNOSIS — C15.5 MALIGNANT NEOPLASM OF LOWER THIRD OF ESOPHAGUS (HCC): ICD-10-CM

## 2021-01-01 DIAGNOSIS — C16.9 GASTRIC CARCINOMA (HCC): Primary | ICD-10-CM

## 2021-01-01 DIAGNOSIS — Z01.818 PRE-OP EXAM: Primary | ICD-10-CM

## 2021-01-01 DIAGNOSIS — R53.1 WEAKNESS: Primary | ICD-10-CM

## 2021-01-01 DIAGNOSIS — K90.9 INTESTINAL MALABSORPTION, UNSPECIFIED TYPE: ICD-10-CM

## 2021-01-01 DIAGNOSIS — R80.9 PROTEINURIA, UNSPECIFIED TYPE: Primary | ICD-10-CM

## 2021-01-01 LAB
ALBUMIN SERPL-MCNC: 3.3 G/DL (ref 3.5–5.2)
ALBUMIN SERPL-MCNC: 3.4 G/DL (ref 3.5–5.2)
ALBUMIN SERPL-MCNC: 3.7 G/DL (ref 3.5–5.2)
ALBUMIN/GLOB SERPL: 1.1 G/DL
ALBUMIN/GLOB SERPL: 1.3 G/DL
ALBUMIN/GLOB SERPL: 1.4 G/DL
ALP SERPL-CCNC: 123 U/L (ref 39–117)
ALP SERPL-CCNC: 131 U/L (ref 39–117)
ALP SERPL-CCNC: 134 U/L (ref 39–117)
ALT SERPL W P-5'-P-CCNC: 10 U/L (ref 1–41)
ALT SERPL W P-5'-P-CCNC: 10 U/L (ref 1–41)
ALT SERPL W P-5'-P-CCNC: 11 U/L (ref 1–41)
ANION GAP SERPL CALCULATED.3IONS-SCNC: 10.7 MMOL/L (ref 5–15)
ANION GAP SERPL CALCULATED.3IONS-SCNC: 11 MMOL/L (ref 5–15)
ANION GAP SERPL CALCULATED.3IONS-SCNC: 12 MMOL/L (ref 5–15)
ANION GAP SERPL CALCULATED.3IONS-SCNC: 12 MMOL/L (ref 5–15)
ANION GAP SERPL CALCULATED.3IONS-SCNC: 15 MMOL/L (ref 5–15)
ANISOCYTOSIS BLD QL: ABNORMAL
APTT PPP: 30.9 SECONDS (ref 24–31)
ARTERIAL PATENCY WRIST A: POSITIVE
ARTERIAL PATENCY WRIST A: POSITIVE
AST SERPL-CCNC: 27 U/L (ref 1–40)
AST SERPL-CCNC: 30 U/L (ref 1–40)
AST SERPL-CCNC: 39 U/L (ref 1–40)
ATMOSPHERIC PRESS: ABNORMAL MM[HG]
ATMOSPHERIC PRESS: ABNORMAL MM[HG]
BASE EXCESS BLDA CALC-SCNC: -13.7 MMOL/L (ref 0–3)
BASE EXCESS BLDA CALC-SCNC: -17.9 MMOL/L (ref 0–3)
BASOPHILS # BLD AUTO: 0.04 10*3/MM3 (ref 0–0.2)
BASOPHILS # BLD AUTO: 0.1 10*3/MM3 (ref 0–0.2)
BASOPHILS # BLD AUTO: 0.1 10*3/MM3 (ref 0–0.2)
BASOPHILS NFR BLD AUTO: 0.8 % (ref 0–1.5)
BASOPHILS NFR BLD AUTO: 1.4 % (ref 0–1.5)
BASOPHILS NFR BLD AUTO: 1.8 % (ref 0–1.5)
BDY SITE: ABNORMAL
BDY SITE: ABNORMAL
BILIRUB SERPL-MCNC: 0.5 MG/DL (ref 0–1.2)
BILIRUB SERPL-MCNC: 0.5 MG/DL (ref 0–1.2)
BILIRUB SERPL-MCNC: 1.2 MG/DL (ref 0–1.2)
BILIRUB UR QL STRIP: ABNORMAL
BUN SERPL-MCNC: 12 MG/DL (ref 8–23)
BUN SERPL-MCNC: 13 MG/DL (ref 8–23)
BUN SERPL-MCNC: 17 MG/DL (ref 8–23)
BUN SERPL-MCNC: 22 MG/DL (ref 8–23)
BUN SERPL-MCNC: 24 MG/DL (ref 8–23)
BUN/CREAT SERPL: 17.4 (ref 7–25)
BUN/CREAT SERPL: 18.6 (ref 7–25)
BUN/CREAT SERPL: 22.7 (ref 7–25)
BUN/CREAT SERPL: 27.9 (ref 7–25)
BUN/CREAT SERPL: 30.6 (ref 7–25)
C3 FRG RBC-MCNC: ABNORMAL
CA-I BLDA-SCNC: 1.08 MMOL/L (ref 1.15–1.33)
CALCIUM SPEC-SCNC: 8.9 MG/DL (ref 8.6–10.5)
CALCIUM SPEC-SCNC: 9 MG/DL (ref 8.6–10.5)
CALCIUM SPEC-SCNC: 9.1 MG/DL (ref 8.6–10.5)
CALCIUM SPEC-SCNC: 9.3 MG/DL (ref 8.6–10.5)
CALCIUM SPEC-SCNC: 9.5 MG/DL (ref 8.6–10.5)
CHLORIDE SERPL-SCNC: 100 MMOL/L (ref 98–107)
CHLORIDE SERPL-SCNC: 101 MMOL/L (ref 98–107)
CHLORIDE SERPL-SCNC: 101 MMOL/L (ref 98–107)
CHLORIDE SERPL-SCNC: 104 MMOL/L (ref 98–107)
CHLORIDE SERPL-SCNC: 97 MMOL/L (ref 98–107)
CLARITY UR: ABNORMAL
CO2 BLDA-SCNC: 10.8 MMOL/L (ref 22–29)
CO2 BLDA-SCNC: 6.2 MMOL/L (ref 22–29)
CO2 SERPL-SCNC: 20 MMOL/L (ref 22–29)
CO2 SERPL-SCNC: 21 MMOL/L (ref 22–29)
CO2 SERPL-SCNC: 21 MMOL/L (ref 22–29)
CO2 SERPL-SCNC: 22.3 MMOL/L (ref 22–29)
CO2 SERPL-SCNC: 23 MMOL/L (ref 22–29)
COLOR UR: ABNORMAL
CREAT SERPL-MCNC: 0.69 MG/DL (ref 0.76–1.27)
CREAT SERPL-MCNC: 0.7 MG/DL (ref 0.76–1.27)
CREAT SERPL-MCNC: 0.72 MG/DL (ref 0.76–1.27)
CREAT SERPL-MCNC: 0.75 MG/DL (ref 0.76–1.27)
CREAT SERPL-MCNC: 0.86 MG/DL (ref 0.76–1.27)
D-LACTATE SERPL-SCNC: 14.9 MMOL/L (ref 0.5–2)
D-LACTATE SERPL-SCNC: 15 MMOL/L (ref 0.5–2)
DEPRECATED RDW RBC AUTO: 47.3 FL (ref 37–54)
DEPRECATED RDW RBC AUTO: 47.7 FL (ref 37–54)
DEPRECATED RDW RBC AUTO: 51.2 FL (ref 37–54)
DEPRECATED RDW RBC AUTO: 51.2 FL (ref 37–54)
DEPRECATED RDW RBC AUTO: 67.8 FL (ref 37–54)
EOSINOPHIL # BLD AUTO: 0 10*3/MM3 (ref 0–0.4)
EOSINOPHIL # BLD AUTO: 0 10*3/MM3 (ref 0–0.4)
EOSINOPHIL # BLD AUTO: 0.02 10*3/MM3 (ref 0–0.4)
EOSINOPHIL NFR BLD AUTO: 0.1 % (ref 0.3–6.2)
EOSINOPHIL NFR BLD AUTO: 0.4 % (ref 0.3–6.2)
EOSINOPHIL NFR BLD AUTO: 0.4 % (ref 0.3–6.2)
ERYTHROCYTE [DISTWIDTH] IN BLOOD BY AUTOMATED COUNT: 15.2 % (ref 12.3–15.4)
ERYTHROCYTE [DISTWIDTH] IN BLOOD BY AUTOMATED COUNT: 15.4 % (ref 12.3–15.4)
ERYTHROCYTE [DISTWIDTH] IN BLOOD BY AUTOMATED COUNT: 16.5 % (ref 12.3–15.4)
ERYTHROCYTE [DISTWIDTH] IN BLOOD BY AUTOMATED COUNT: 16.6 % (ref 12.3–15.4)
ERYTHROCYTE [DISTWIDTH] IN BLOOD BY AUTOMATED COUNT: 21 % (ref 12.3–15.4)
GFR SERPL CREATININE-BSD FRML MDRD: 106 ML/MIN/1.73
GFR SERPL CREATININE-BSD FRML MDRD: 111 ML/MIN/1.73
GFR SERPL CREATININE-BSD FRML MDRD: 115 ML/MIN/1.73
GFR SERPL CREATININE-BSD FRML MDRD: 117 ML/MIN/1.73
GFR SERPL CREATININE-BSD FRML MDRD: 91 ML/MIN/1.73
GLOBULIN UR ELPH-MCNC: 2.4 GM/DL
GLOBULIN UR ELPH-MCNC: 2.9 GM/DL
GLOBULIN UR ELPH-MCNC: 3.1 GM/DL
GLUCOSE BLDC GLUCOMTR-MCNC: 106 MG/DL (ref 70–105)
GLUCOSE BLDC GLUCOMTR-MCNC: 133 MG/DL (ref 70–105)
GLUCOSE BLDC GLUCOMTR-MCNC: 297 MG/DL (ref 70–105)
GLUCOSE BLDC GLUCOMTR-MCNC: 44 MG/DL (ref 70–105)
GLUCOSE BLDC GLUCOMTR-MCNC: 574 MG/DL (ref 74–100)
GLUCOSE BLDC GLUCOMTR-MCNC: 574 MG/DL (ref 74–100)
GLUCOSE BLDC GLUCOMTR-MCNC: 62 MG/DL (ref 70–105)
GLUCOSE BLDC GLUCOMTR-MCNC: 67 MG/DL (ref 70–105)
GLUCOSE SERPL-MCNC: 103 MG/DL (ref 65–99)
GLUCOSE SERPL-MCNC: 103 MG/DL (ref 65–99)
GLUCOSE SERPL-MCNC: 104 MG/DL (ref 65–99)
GLUCOSE SERPL-MCNC: 107 MG/DL (ref 65–99)
GLUCOSE SERPL-MCNC: 109 MG/DL (ref 65–99)
GLUCOSE UR STRIP-MCNC: NEGATIVE MG/DL
HCO3 BLDA-SCNC: 10.2 MMOL/L (ref 21–28)
HCO3 BLDA-SCNC: 5.9 MMOL/L (ref 21–28)
HCT VFR BLD AUTO: 16.8 % (ref 37.5–51)
HCT VFR BLD AUTO: 29.4 % (ref 37.5–51)
HCT VFR BLD AUTO: 35.4 % (ref 37.5–51)
HCT VFR BLD AUTO: 37.6 % (ref 37.5–51)
HCT VFR BLD AUTO: 38.5 % (ref 37.5–51)
HCT VFR BLDA CALC: 15 % (ref 38–51)
HEMODILUTION: NO
HEMODILUTION: NO
HGB BLD-MCNC: 10.3 G/DL (ref 13–17.7)
HGB BLD-MCNC: 12.4 G/DL (ref 13–17.7)
HGB BLD-MCNC: 12.9 G/DL (ref 13–17.7)
HGB BLD-MCNC: 13.1 G/DL (ref 13–17.7)
HGB BLD-MCNC: 6 G/DL (ref 13–17.7)
HGB BLDA-MCNC: 5.1 G/DL (ref 12–17)
HGB UR QL STRIP.AUTO: NEGATIVE
HOLD SPECIMEN: NORMAL
INHALED O2 CONCENTRATION: 42 %
INHALED O2 CONCENTRATION: <21 %
INR PPP: 1.37 (ref 0.93–1.1)
KETONES UR QL STRIP: ABNORMAL
LACTATE HOLD SPECIMEN: NORMAL
LEUKOCYTE ESTERASE UR QL STRIP.AUTO: NEGATIVE
LYMPHOCYTES # BLD AUTO: 0.54 10*3/MM3 (ref 0.7–3.1)
LYMPHOCYTES # BLD AUTO: 0.6 10*3/MM3 (ref 0.7–3.1)
LYMPHOCYTES # BLD AUTO: 0.7 10*3/MM3 (ref 0.7–3.1)
LYMPHOCYTES # BLD MANUAL: 2.11 10*3/MM3 (ref 0.7–3.1)
LYMPHOCYTES NFR BLD AUTO: 10.2 % (ref 19.6–45.3)
LYMPHOCYTES NFR BLD AUTO: 10.5 % (ref 19.6–45.3)
LYMPHOCYTES NFR BLD AUTO: 8.6 % (ref 19.6–45.3)
LYMPHOCYTES NFR BLD MANUAL: 18 % (ref 19.6–45.3)
LYMPHOCYTES NFR BLD MANUAL: 5 % (ref 5–12)
MAGNESIUM SERPL-MCNC: 1.6 MG/DL (ref 1.6–2.4)
MCH RBC QN AUTO: 30.4 PG (ref 26.6–33)
MCH RBC QN AUTO: 30.5 PG (ref 26.6–33)
MCH RBC QN AUTO: 30.7 PG (ref 26.6–33)
MCH RBC QN AUTO: 31.1 PG (ref 26.6–33)
MCH RBC QN AUTO: 31.6 PG (ref 26.6–33)
MCHC RBC AUTO-ENTMCNC: 34 G/DL (ref 31.5–35.7)
MCHC RBC AUTO-ENTMCNC: 34.3 G/DL (ref 31.5–35.7)
MCHC RBC AUTO-ENTMCNC: 35 G/DL (ref 31.5–35.7)
MCHC RBC AUTO-ENTMCNC: 35 G/DL (ref 31.5–35.7)
MCHC RBC AUTO-ENTMCNC: 35.5 G/DL (ref 31.5–35.7)
MCV RBC AUTO: 87.9 FL (ref 79–97)
MCV RBC AUTO: 88.7 FL (ref 79–97)
MCV RBC AUTO: 88.9 FL (ref 79–97)
MCV RBC AUTO: 89.1 FL (ref 79–97)
MCV RBC AUTO: 89.8 FL (ref 79–97)
METAMYELOCYTES NFR BLD MANUAL: 2 % (ref 0–0)
MODALITY: ABNORMAL
MODALITY: ABNORMAL
MONOCYTES # BLD AUTO: 0.59 10*3/MM3 (ref 0.1–0.9)
MONOCYTES # BLD AUTO: 0.8 10*3/MM3 (ref 0.1–0.9)
MONOCYTES # BLD AUTO: 1.03 10*3/MM3 (ref 0.1–0.9)
MONOCYTES # BLD AUTO: 1.1 10*3/MM3 (ref 0.1–0.9)
MONOCYTES NFR BLD AUTO: 10.7 % (ref 5–12)
MONOCYTES NFR BLD AUTO: 17.9 % (ref 5–12)
MONOCYTES NFR BLD AUTO: 19.4 % (ref 5–12)
MYELOCYTES NFR BLD MANUAL: 4 % (ref 0–0)
NEUTROPHILS # BLD AUTO: 8.31 10*3/MM3 (ref 1.7–7)
NEUTROPHILS NFR BLD AUTO: 3.67 10*3/MM3 (ref 1.7–7)
NEUTROPHILS NFR BLD AUTO: 4.3 10*3/MM3 (ref 1.7–7)
NEUTROPHILS NFR BLD AUTO: 6.1 10*3/MM3 (ref 1.7–7)
NEUTROPHILS NFR BLD AUTO: 69.2 % (ref 42.7–76)
NEUTROPHILS NFR BLD AUTO: 69.7 % (ref 42.7–76)
NEUTROPHILS NFR BLD AUTO: 78.9 % (ref 42.7–76)
NEUTROPHILS NFR BLD MANUAL: 47 % (ref 42.7–76)
NEUTS BAND NFR BLD MANUAL: 24 % (ref 0–5)
NITRITE UR QL STRIP: NEGATIVE
NRBC BLD AUTO-RTO: 0.1 /100 WBC (ref 0–0.2)
NRBC BLD AUTO-RTO: 0.8 /100 WBC (ref 0–0.2)
NRBC SPEC MANUAL: 27 /100 WBC (ref 0–0.2)
PCO2 BLDA: 10 MM HG (ref 35–48)
PCO2 BLDA: 17.7 MM HG (ref 35–48)
PH BLDA: 7.37 PH UNITS (ref 7.35–7.45)
PH BLDA: 7.38 PH UNITS (ref 7.35–7.45)
PH UR STRIP.AUTO: 6 [PH] (ref 5–8)
PHOSPHATE SERPL-MCNC: 2.8 MG/DL (ref 2.5–4.5)
PLATELET # BLD AUTO: 138 10*3/MM3 (ref 140–450)
PLATELET # BLD AUTO: 170 10*3/MM3 (ref 140–450)
PLATELET # BLD AUTO: 208 10*3/MM3 (ref 140–450)
PLATELET # BLD AUTO: 29 10*3/MM3 (ref 140–450)
PLATELET # BLD AUTO: 68 10*3/MM3 (ref 140–450)
PMV BLD AUTO: 11.1 FL (ref 6–12)
PMV BLD AUTO: 6.6 FL (ref 6–12)
PMV BLD AUTO: 9.2 FL (ref 6–12)
PMV BLD AUTO: 9.4 FL (ref 6–12)
PMV BLD AUTO: 9.7 FL (ref 6–12)
PO2 BLDA: 111.3 MM HG (ref 83–108)
PO2 BLDA: 115 MM HG (ref 83–108)
POIKILOCYTOSIS BLD QL SMEAR: ABNORMAL
POLYCHROMASIA BLD QL SMEAR: ABNORMAL
POTASSIUM BLDA-SCNC: 5.1 MMOL/L (ref 3.5–4.5)
POTASSIUM SERPL-SCNC: 3.6 MMOL/L (ref 3.5–5.2)
POTASSIUM SERPL-SCNC: 3.7 MMOL/L (ref 3.5–5.2)
POTASSIUM SERPL-SCNC: 4 MMOL/L (ref 3.5–5.2)
POTASSIUM SERPL-SCNC: 4 MMOL/L (ref 3.5–5.2)
POTASSIUM SERPL-SCNC: 4.2 MMOL/L (ref 3.5–5.2)
POTASSIUM SERPL-SCNC: 4.4 MMOL/L (ref 3.5–5.2)
PROT 24H UR-MRATE: 98 MG/24HOURS (ref 0–150)
PROT SERPL-MCNC: 5.8 G/DL (ref 6–8.5)
PROT SERPL-MCNC: 6.4 G/DL (ref 6–8.5)
PROT SERPL-MCNC: 6.6 G/DL (ref 6–8.5)
PROT UR QL STRIP: ABNORMAL
PROTHROMBIN TIME: 14.8 SECONDS (ref 9.6–11.7)
QT INTERVAL: 302 MS
QT INTERVAL: 370 MS
QT INTERVAL: 412 MS
RBC # BLD AUTO: 1.89 10*6/MM3 (ref 4.14–5.8)
RBC # BLD AUTO: 3.34 10*6/MM3 (ref 4.14–5.8)
RBC # BLD AUTO: 3.98 10*6/MM3 (ref 4.14–5.8)
RBC # BLD AUTO: 4.24 10*6/MM3 (ref 4.14–5.8)
RBC # BLD AUTO: 4.29 10*6/MM3 (ref 4.14–5.8)
SAO2 % BLDCOA: 98.5 % (ref 94–98)
SAO2 % BLDCOA: 98.6 % (ref 94–98)
SARS-COV-2 ORF1AB RESP QL NAA+PROBE: DETECTED
SMALL PLATELETS BLD QL SMEAR: ABNORMAL
SODIUM BLD-SCNC: 137 MMOL/L (ref 138–146)
SODIUM SERPL-SCNC: 133 MMOL/L (ref 136–145)
SODIUM SERPL-SCNC: 133 MMOL/L (ref 136–145)
SODIUM SERPL-SCNC: 134 MMOL/L (ref 136–145)
SODIUM SERPL-SCNC: 135 MMOL/L (ref 136–145)
SODIUM SERPL-SCNC: 136 MMOL/L (ref 136–145)
SP GR UR STRIP: 1.02 (ref 1–1.03)
UROBILINOGEN UR QL STRIP: ABNORMAL
WBC # BLD AUTO: 11.7 10*3/MM3 (ref 3.4–10.8)
WBC # BLD AUTO: 5.3 10*3/MM3 (ref 3.4–10.8)
WBC # BLD AUTO: 6.2 10*3/MM3 (ref 3.4–10.8)
WBC # BLD AUTO: 6.8 10*3/MM3 (ref 3.4–10.8)
WBC # BLD AUTO: 7.8 10*3/MM3 (ref 3.4–10.8)
WBC MORPH BLD: NORMAL
WHOLE BLOOD HOLD SPECIMEN: NORMAL

## 2021-01-01 PROCEDURE — 85730 THROMBOPLASTIN TIME PARTIAL: CPT

## 2021-01-01 PROCEDURE — 85027 COMPLETE CBC AUTOMATED: CPT | Performed by: HOSPITALIST

## 2021-01-01 PROCEDURE — 82330 ASSAY OF CALCIUM: CPT

## 2021-01-01 PROCEDURE — 3E0G76Z INTRODUCTION OF NUTRITIONAL SUBSTANCE INTO UPPER GI, VIA NATURAL OR ARTIFICIAL OPENING: ICD-10-PCS | Performed by: SURGERY

## 2021-01-01 PROCEDURE — 93010 ELECTROCARDIOGRAM REPORT: CPT | Performed by: INTERNAL MEDICINE

## 2021-01-01 PROCEDURE — 80053 COMPREHEN METABOLIC PANEL: CPT | Performed by: INTERNAL MEDICINE

## 2021-01-01 PROCEDURE — 85025 COMPLETE CBC W/AUTO DIFF WBC: CPT | Performed by: INTERNAL MEDICINE

## 2021-01-01 PROCEDURE — 25010000002 DIPHENHYDRAMINE PER 50 MG: Performed by: INTERNAL MEDICINE

## 2021-01-01 PROCEDURE — 25010000002 PROPOFOL 10 MG/ML EMULSION: Performed by: NURSE ANESTHETIST, CERTIFIED REGISTERED

## 2021-01-01 PROCEDURE — G0378 HOSPITAL OBSERVATION PER HR: HCPCS

## 2021-01-01 PROCEDURE — 93005 ELECTROCARDIOGRAM TRACING: CPT | Performed by: HOSPITALIST

## 2021-01-01 PROCEDURE — 99233 SBSQ HOSP IP/OBS HIGH 50: CPT | Performed by: HOSPITALIST

## 2021-01-01 PROCEDURE — 25010000003 HEPARIN LOCK FLUSH PER 10 UNITS: Performed by: INTERNAL MEDICINE

## 2021-01-01 PROCEDURE — 25010000002 MORPHINE PER 10 MG: Performed by: SURGERY

## 2021-01-01 PROCEDURE — 92950 HEART/LUNG RESUSCITATION CPR: CPT

## 2021-01-01 PROCEDURE — 81050 URINALYSIS VOLUME MEASURE: CPT

## 2021-01-01 PROCEDURE — 96413 CHEMO IV INFUSION 1 HR: CPT

## 2021-01-01 PROCEDURE — 80051 ELECTROLYTE PANEL: CPT

## 2021-01-01 PROCEDURE — 25010000002 RAMUCIRUMAB 500 MG/50ML SOLUTION 50 ML VIAL: Performed by: INTERNAL MEDICINE

## 2021-01-01 PROCEDURE — 84100 ASSAY OF PHOSPHORUS: CPT

## 2021-01-01 PROCEDURE — 63710000001 ONDANSETRON PER 8 MG: Performed by: SURGERY

## 2021-01-01 PROCEDURE — 82962 GLUCOSE BLOOD TEST: CPT

## 2021-01-01 PROCEDURE — 94799 UNLISTED PULMONARY SVC/PX: CPT

## 2021-01-01 PROCEDURE — 25010000002 MORPHINE PER 10 MG: Performed by: HOSPITALIST

## 2021-01-01 PROCEDURE — 82803 BLOOD GASES ANY COMBINATION: CPT

## 2021-01-01 PROCEDURE — 99232 SBSQ HOSP IP/OBS MODERATE 35: CPT | Performed by: HOSPITALIST

## 2021-01-01 PROCEDURE — 36591 DRAW BLOOD OFF VENOUS DEVICE: CPT

## 2021-01-01 PROCEDURE — 84156 ASSAY OF PROTEIN URINE: CPT

## 2021-01-01 PROCEDURE — 25010000002 SUCCINYLCHOLINE PER 20 MG: Performed by: NURSE ANESTHETIST, CERTIFIED REGISTERED

## 2021-01-01 PROCEDURE — 83605 ASSAY OF LACTIC ACID: CPT | Performed by: HOSPITALIST

## 2021-01-01 PROCEDURE — 25010000002 DEXAMETHASONE SODIUM PHOSPHATE 120 MG/30ML SOLUTION: Performed by: INTERNAL MEDICINE

## 2021-01-01 PROCEDURE — 25010000002 HYDROMORPHONE PER 4 MG: Performed by: NURSE ANESTHETIST, CERTIFIED REGISTERED

## 2021-01-01 PROCEDURE — 99223 1ST HOSP IP/OBS HIGH 75: CPT | Performed by: INTERNAL MEDICINE

## 2021-01-01 PROCEDURE — 83605 ASSAY OF LACTIC ACID: CPT

## 2021-01-01 PROCEDURE — 25010000002 DEXAMETHASONE PER 1 MG: Performed by: ANESTHESIOLOGY

## 2021-01-01 PROCEDURE — 25010000002 CEFOXITIN PER 1 G: Performed by: SURGERY

## 2021-01-01 PROCEDURE — 93005 ELECTROCARDIOGRAM TRACING: CPT | Performed by: SURGERY

## 2021-01-01 PROCEDURE — 25010000002 ONDANSETRON PER 1 MG: Performed by: ANESTHESIOLOGY

## 2021-01-01 PROCEDURE — 99024 POSTOP FOLLOW-UP VISIT: CPT | Performed by: SURGERY

## 2021-01-01 PROCEDURE — 25010000002 PACLITAXEL PER 30 MG: Performed by: INTERNAL MEDICINE

## 2021-01-01 PROCEDURE — 80053 COMPREHEN METABOLIC PANEL: CPT | Performed by: NURSE PRACTITIONER

## 2021-01-01 PROCEDURE — 36415 COLL VENOUS BLD VENIPUNCTURE: CPT

## 2021-01-01 PROCEDURE — 25010000002 MIDAZOLAM PER 1 MG: Performed by: NURSE ANESTHETIST, CERTIFIED REGISTERED

## 2021-01-01 PROCEDURE — 80053 COMPREHEN METABOLIC PANEL: CPT

## 2021-01-01 PROCEDURE — 25010000002 METOCLOPRAMIDE PER 10 MG: Performed by: HOSPITALIST

## 2021-01-01 PROCEDURE — 85025 COMPLETE CBC W/AUTO DIFF WBC: CPT | Performed by: NURSE PRACTITIONER

## 2021-01-01 PROCEDURE — 80048 BASIC METABOLIC PNL TOTAL CA: CPT | Performed by: INTERNAL MEDICINE

## 2021-01-01 PROCEDURE — 85610 PROTHROMBIN TIME: CPT

## 2021-01-01 PROCEDURE — 85025 COMPLETE CBC W/AUTO DIFF WBC: CPT

## 2021-01-01 PROCEDURE — 36600 WITHDRAWAL OF ARTERIAL BLOOD: CPT

## 2021-01-01 PROCEDURE — 44300 OPEN BOWEL TO SKIN: CPT | Performed by: SURGERY

## 2021-01-01 PROCEDURE — 84132 ASSAY OF SERUM POTASSIUM: CPT

## 2021-01-01 PROCEDURE — 85025 COMPLETE CBC W/AUTO DIFF WBC: CPT | Performed by: HOSPITALIST

## 2021-01-01 PROCEDURE — 25010000002 FERRIC CARBOXYMALTOSE 750 MG/15ML SOLUTION 15 ML VIAL: Performed by: INTERNAL MEDICINE

## 2021-01-01 PROCEDURE — 96367 TX/PROPH/DG ADDL SEQ IV INF: CPT

## 2021-01-01 PROCEDURE — 85007 BL SMEAR W/DIFF WBC COUNT: CPT | Performed by: HOSPITALIST

## 2021-01-01 PROCEDURE — 93005 ELECTROCARDIOGRAM TRACING: CPT | Performed by: INTERNAL MEDICINE

## 2021-01-01 PROCEDURE — U0004 COV-19 TEST NON-CDC HGH THRU: HCPCS

## 2021-01-01 PROCEDURE — 25010000002 FENTANYL CITRATE (PF) 100 MCG/2ML SOLUTION: Performed by: NURSE ANESTHETIST, CERTIFIED REGISTERED

## 2021-01-01 PROCEDURE — 81003 URINALYSIS AUTO W/O SCOPE: CPT | Performed by: INTERNAL MEDICINE

## 2021-01-01 PROCEDURE — 85018 HEMOGLOBIN: CPT

## 2021-01-01 PROCEDURE — C9803 HOPD COVID-19 SPEC COLLECT: HCPCS

## 2021-01-01 PROCEDURE — 0DH63UZ INSERTION OF FEEDING DEVICE INTO STOMACH, PERCUTANEOUS APPROACH: ICD-10-PCS | Performed by: SURGERY

## 2021-01-01 PROCEDURE — 71046 X-RAY EXAM CHEST 2 VIEWS: CPT

## 2021-01-01 PROCEDURE — 93005 ELECTROCARDIOGRAM TRACING: CPT

## 2021-01-01 PROCEDURE — 96375 TX/PRO/DX INJ NEW DRUG ADDON: CPT

## 2021-01-01 PROCEDURE — 99244 OFF/OP CNSLTJ NEW/EST MOD 40: CPT | Performed by: SURGERY

## 2021-01-01 PROCEDURE — 96417 CHEMO IV INFUS EACH ADDL SEQ: CPT

## 2021-01-01 PROCEDURE — 99284 EMERGENCY DEPT VISIT MOD MDM: CPT

## 2021-01-01 PROCEDURE — 71045 X-RAY EXAM CHEST 1 VIEW: CPT

## 2021-01-01 PROCEDURE — 83735 ASSAY OF MAGNESIUM: CPT

## 2021-01-01 PROCEDURE — 44300 OPEN BOWEL TO SKIN: CPT | Performed by: REGISTERED NURSE

## 2021-01-01 PROCEDURE — 80048 BASIC METABOLIC PNL TOTAL CA: CPT | Performed by: HOSPITALIST

## 2021-01-01 RX ORDER — HYDROMORPHONE HCL 110MG/55ML
0.5 PATIENT CONTROLLED ANALGESIA SYRINGE INTRAVENOUS
Status: DISCONTINUED | OUTPATIENT
Start: 2021-01-01 | End: 2021-01-01

## 2021-01-01 RX ORDER — SODIUM CHLORIDE 9 MG/ML
100 INJECTION, SOLUTION INTRAVENOUS CONTINUOUS
Status: DISCONTINUED | OUTPATIENT
Start: 2021-01-01 | End: 2021-01-01

## 2021-01-01 RX ORDER — FAMOTIDINE 10 MG/ML
20 INJECTION, SOLUTION INTRAVENOUS AS NEEDED
Status: CANCELLED | OUTPATIENT
Start: 2021-01-01

## 2021-01-01 RX ORDER — FAMOTIDINE 10 MG/ML
20 INJECTION, SOLUTION INTRAVENOUS ONCE
Status: CANCELLED | OUTPATIENT
Start: 2021-01-01

## 2021-01-01 RX ORDER — METOCLOPRAMIDE HYDROCHLORIDE 5 MG/ML
5 INJECTION INTRAMUSCULAR; INTRAVENOUS
Status: DISCONTINUED | OUTPATIENT
Start: 2021-01-01 | End: 2021-01-01 | Stop reason: HOSPADM

## 2021-01-01 RX ORDER — LIDOCAINE HYDROCHLORIDE 20 MG/ML
INJECTION, SOLUTION EPIDURAL; INFILTRATION; INTRACAUDAL; PERINEURAL AS NEEDED
Status: DISCONTINUED | OUTPATIENT
Start: 2021-01-01 | End: 2021-01-01 | Stop reason: SURG

## 2021-01-01 RX ORDER — METOCLOPRAMIDE 5 MG/1
5 TABLET ORAL 3 TIMES DAILY
Qty: 90 TABLET | Refills: 0 | Status: SHIPPED | OUTPATIENT
Start: 2021-01-01

## 2021-01-01 RX ORDER — PHENYLEPHRINE HCL IN 0.9% NACL 0.5 MG/5ML
SYRINGE (ML) INTRAVENOUS AS NEEDED
Status: DISCONTINUED | OUTPATIENT
Start: 2021-01-01 | End: 2021-01-01 | Stop reason: SURG

## 2021-01-01 RX ORDER — HYDROMORPHONE HCL 110MG/55ML
0.2 PATIENT CONTROLLED ANALGESIA SYRINGE INTRAVENOUS
Status: DISCONTINUED | OUTPATIENT
Start: 2021-01-01 | End: 2021-01-01

## 2021-01-01 RX ORDER — DIPHENHYDRAMINE HYDROCHLORIDE 50 MG/ML
50 INJECTION INTRAMUSCULAR; INTRAVENOUS AS NEEDED
Status: CANCELLED | OUTPATIENT
Start: 2021-01-01

## 2021-01-01 RX ORDER — SODIUM CHLORIDE 9 MG/ML
250 INJECTION, SOLUTION INTRAVENOUS ONCE
Status: COMPLETED | OUTPATIENT
Start: 2021-01-01 | End: 2021-01-01

## 2021-01-01 RX ORDER — CHLORHEXIDINE GLUCONATE 0.12 MG/ML
15 RINSE ORAL EVERY 12 HOURS SCHEDULED
Status: DISPENSED | OUTPATIENT
Start: 2021-01-01 | End: 2021-01-01

## 2021-01-01 RX ORDER — SODIUM CHLORIDE 0.9 % (FLUSH) 0.9 %
10 SYRINGE (ML) INJECTION EVERY 12 HOURS SCHEDULED
Status: DISCONTINUED | OUTPATIENT
Start: 2021-01-01 | End: 2021-01-01 | Stop reason: HOSPADM

## 2021-01-01 RX ORDER — MORPHINE SULFATE 4 MG/ML
2 INJECTION, SOLUTION INTRAMUSCULAR; INTRAVENOUS
Status: DISCONTINUED | OUTPATIENT
Start: 2021-01-01 | End: 2021-01-01 | Stop reason: HOSPADM

## 2021-01-01 RX ORDER — SODIUM CHLORIDE 0.9 % (FLUSH) 0.9 %
10 SYRINGE (ML) INJECTION AS NEEDED
Status: DISCONTINUED | OUTPATIENT
Start: 2021-01-01 | End: 2021-01-01 | Stop reason: HOSPADM

## 2021-01-01 RX ORDER — DEXAMETHASONE SODIUM PHOSPHATE 4 MG/ML
INJECTION, SOLUTION INTRA-ARTICULAR; INTRALESIONAL; INTRAMUSCULAR; INTRAVENOUS; SOFT TISSUE AS NEEDED
Status: DISCONTINUED | OUTPATIENT
Start: 2021-01-01 | End: 2021-01-01 | Stop reason: SURG

## 2021-01-01 RX ORDER — ROCURONIUM BROMIDE 10 MG/ML
INJECTION, SOLUTION INTRAVENOUS AS NEEDED
Status: DISCONTINUED | OUTPATIENT
Start: 2021-01-01 | End: 2021-01-01 | Stop reason: SURG

## 2021-01-01 RX ORDER — FENTANYL CITRATE 50 UG/ML
INJECTION, SOLUTION INTRAMUSCULAR; INTRAVENOUS AS NEEDED
Status: DISCONTINUED | OUTPATIENT
Start: 2021-01-01 | End: 2021-01-01 | Stop reason: SURG

## 2021-01-01 RX ORDER — HEPARIN SODIUM (PORCINE) LOCK FLUSH IV SOLN 100 UNIT/ML 100 UNIT/ML
500 SOLUTION INTRAVENOUS AS NEEDED
Status: DISCONTINUED | OUTPATIENT
Start: 2021-01-01 | End: 2021-01-01 | Stop reason: HOSPADM

## 2021-01-01 RX ORDER — MORPHINE SULFATE 4 MG/ML
4 INJECTION, SOLUTION INTRAMUSCULAR; INTRAVENOUS EVERY 4 HOURS PRN
Status: DISCONTINUED | OUTPATIENT
Start: 2021-01-01 | End: 2021-01-01 | Stop reason: HOSPADM

## 2021-01-01 RX ORDER — PROPOFOL 10 MG/ML
VIAL (ML) INTRAVENOUS AS NEEDED
Status: DISCONTINUED | OUTPATIENT
Start: 2021-01-01 | End: 2021-01-01 | Stop reason: SURG

## 2021-01-01 RX ORDER — DEXTROSE MONOHYDRATE 100 MG/ML
75 INJECTION, SOLUTION INTRAVENOUS CONTINUOUS
Status: DISCONTINUED | OUTPATIENT
Start: 2021-01-01 | End: 2021-01-01 | Stop reason: HOSPADM

## 2021-01-01 RX ORDER — HEPARIN SODIUM (PORCINE) LOCK FLUSH IV SOLN 100 UNIT/ML 100 UNIT/ML
500 SOLUTION INTRAVENOUS AS NEEDED
Status: CANCELLED | OUTPATIENT
Start: 2021-01-01

## 2021-01-01 RX ORDER — NALOXONE HCL 0.4 MG/ML
0.4 VIAL (ML) INJECTION AS NEEDED
Status: DISCONTINUED | OUTPATIENT
Start: 2021-01-01 | End: 2021-01-01

## 2021-01-01 RX ORDER — ONDANSETRON 4 MG/1
8 TABLET, FILM COATED ORAL 3 TIMES DAILY PRN
Status: DISCONTINUED | OUTPATIENT
Start: 2021-01-01 | End: 2021-01-01 | Stop reason: HOSPADM

## 2021-01-01 RX ORDER — MIDAZOLAM HYDROCHLORIDE 1 MG/ML
INJECTION INTRAMUSCULAR; INTRAVENOUS AS NEEDED
Status: DISCONTINUED | OUTPATIENT
Start: 2021-01-01 | End: 2021-01-01 | Stop reason: SURG

## 2021-01-01 RX ORDER — CHLORHEXIDINE GLUCONATE 0.12 MG/ML
15 RINSE ORAL EVERY 12 HOURS SCHEDULED
Status: DISCONTINUED | OUTPATIENT
Start: 2021-01-01 | End: 2021-01-01

## 2021-01-01 RX ORDER — DEXTROSE MONOHYDRATE 25 G/50ML
INJECTION, SOLUTION INTRAVENOUS
Status: COMPLETED
Start: 2021-01-01 | End: 2021-01-01

## 2021-01-01 RX ORDER — SODIUM CHLORIDE 0.9 % (FLUSH) 0.9 %
20 SYRINGE (ML) INJECTION AS NEEDED
Status: DISCONTINUED | OUTPATIENT
Start: 2021-01-01 | End: 2021-01-01 | Stop reason: HOSPADM

## 2021-01-01 RX ORDER — ONDANSETRON 2 MG/ML
INJECTION INTRAMUSCULAR; INTRAVENOUS AS NEEDED
Status: DISCONTINUED | OUTPATIENT
Start: 2021-01-01 | End: 2021-01-01 | Stop reason: SURG

## 2021-01-01 RX ORDER — POLYETHYLENE GLYCOL 3350 17 G/17G
17 POWDER, FOR SOLUTION ORAL 2 TIMES DAILY
Status: DISCONTINUED | OUTPATIENT
Start: 2021-01-01 | End: 2021-01-01 | Stop reason: HOSPADM

## 2021-01-01 RX ORDER — METOCLOPRAMIDE 10 MG/1
10 TABLET ORAL 2 TIMES DAILY
Status: DISCONTINUED | OUTPATIENT
Start: 2021-01-01 | End: 2021-01-01

## 2021-01-01 RX ORDER — SUCCINYLCHOLINE CHLORIDE 20 MG/ML
INJECTION INTRAMUSCULAR; INTRAVENOUS AS NEEDED
Status: DISCONTINUED | OUTPATIENT
Start: 2021-01-01 | End: 2021-01-01 | Stop reason: SURG

## 2021-01-01 RX ORDER — HYDROCODONE BITARTRATE AND ACETAMINOPHEN 5; 325 MG/1; MG/1
1 TABLET ORAL EVERY 6 HOURS PRN
Status: DISCONTINUED | OUTPATIENT
Start: 2021-01-01 | End: 2021-01-01 | Stop reason: HOSPADM

## 2021-01-01 RX ORDER — DEXTROSE AND SODIUM CHLORIDE 5; .9 G/100ML; G/100ML
100 INJECTION, SOLUTION INTRAVENOUS CONTINUOUS
Status: DISCONTINUED | OUTPATIENT
Start: 2021-01-01 | End: 2021-01-01

## 2021-01-01 RX ORDER — ESMOLOL HYDROCHLORIDE 10 MG/ML
INJECTION INTRAVENOUS AS NEEDED
Status: DISCONTINUED | OUTPATIENT
Start: 2021-01-01 | End: 2021-01-01 | Stop reason: SURG

## 2021-01-01 RX ORDER — DEXTROSE MONOHYDRATE 25 G/50ML
50 INJECTION, SOLUTION INTRAVENOUS
Status: DISCONTINUED | OUTPATIENT
Start: 2021-01-01 | End: 2021-01-01 | Stop reason: HOSPADM

## 2021-01-01 RX ORDER — SODIUM CHLORIDE 9 MG/ML
100 INJECTION, SOLUTION INTRAVENOUS CONTINUOUS
Status: DISCONTINUED | OUTPATIENT
Start: 2021-01-01 | End: 2021-01-01 | Stop reason: HOSPADM

## 2021-01-01 RX ORDER — SODIUM CHLORIDE 0.9 % (FLUSH) 0.9 %
20 SYRINGE (ML) INJECTION AS NEEDED
Status: CANCELLED | OUTPATIENT
Start: 2021-01-01

## 2021-01-01 RX ORDER — LORAZEPAM 2 MG/ML
1 INJECTION INTRAMUSCULAR
Status: DISCONTINUED | OUTPATIENT
Start: 2021-01-01 | End: 2021-01-01 | Stop reason: HOSPADM

## 2021-01-01 RX ORDER — SODIUM CHLORIDE 9 MG/ML
250 INJECTION, SOLUTION INTRAVENOUS ONCE
Status: CANCELLED | OUTPATIENT
Start: 2021-01-01

## 2021-01-01 RX ORDER — FAMOTIDINE 10 MG/ML
20 INJECTION, SOLUTION INTRAVENOUS ONCE
Status: COMPLETED | OUTPATIENT
Start: 2021-01-01 | End: 2021-01-01

## 2021-01-01 RX ORDER — ONDANSETRON 2 MG/ML
4 INJECTION INTRAMUSCULAR; INTRAVENOUS ONCE AS NEEDED
Status: DISCONTINUED | OUTPATIENT
Start: 2021-01-01 | End: 2021-01-01

## 2021-01-01 RX ORDER — HYDROCODONE BITARTRATE AND ACETAMINOPHEN 5; 325 MG/1; MG/1
1 TABLET ORAL EVERY 6 HOURS PRN
Qty: 90 TABLET | Refills: 0 | Status: SHIPPED | OUTPATIENT
Start: 2021-01-01

## 2021-01-01 RX ADMIN — Medication 50 MEQ: at 08:50

## 2021-01-01 RX ADMIN — CHLORHEXIDINE GLUCONATE 0.12% ORAL RINSE 15 ML: 1.2 LIQUID ORAL at 09:33

## 2021-01-01 RX ADMIN — ROCURONIUM BROMIDE 20 MG: 10 INJECTION, SOLUTION INTRAVENOUS at 15:25

## 2021-01-01 RX ADMIN — SODIUM CHLORIDE 100 ML/HR: 9 INJECTION, SOLUTION INTRAVENOUS at 20:08

## 2021-01-01 RX ADMIN — HYDROMORPHONE HYDROCHLORIDE 0.5 MG: 2 INJECTION, SOLUTION INTRAMUSCULAR; INTRAVENOUS; SUBCUTANEOUS at 17:30

## 2021-01-01 RX ADMIN — HYDROMORPHONE HYDROCHLORIDE 0.5 MG: 2 INJECTION, SOLUTION INTRAMUSCULAR; INTRAVENOUS; SUBCUTANEOUS at 17:12

## 2021-01-01 RX ADMIN — PHENYLEPHRINE HYDROCHLORIDE 100 MCG: 10 INJECTION INTRAVENOUS at 15:23

## 2021-01-01 RX ADMIN — Medication 10 ML: at 20:04

## 2021-01-01 RX ADMIN — SODIUM CHLORIDE: 0.9 INJECTION, SOLUTION INTRAVENOUS at 15:11

## 2021-01-01 RX ADMIN — METOCLOPRAMIDE 10 MG: 10 TABLET ORAL at 09:39

## 2021-01-01 RX ADMIN — METOCLOPRAMIDE 10 MG: 10 TABLET ORAL at 09:34

## 2021-01-01 RX ADMIN — DEXAMETHASONE SODIUM PHOSPHATE 4 MG: 4 INJECTION, SOLUTION INTRAMUSCULAR; INTRAVENOUS at 15:59

## 2021-01-01 RX ADMIN — CEFOXITIN SODIUM 1 G: 1 POWDER, FOR SOLUTION INTRAVENOUS at 19:01

## 2021-01-01 RX ADMIN — Medication 10 ML: at 09:33

## 2021-01-01 RX ADMIN — MORPHINE SULFATE 4 MG: 4 INJECTION INTRAVENOUS at 12:53

## 2021-01-01 RX ADMIN — CEFAZOLIN SODIUM 2 G: 1 INJECTION, POWDER, FOR SOLUTION INTRAMUSCULAR; INTRAVENOUS at 15:24

## 2021-01-01 RX ADMIN — HYDROCODONE BITARTRATE AND ACETAMINOPHEN 1 TABLET: 5; 325 TABLET ORAL at 09:51

## 2021-01-01 RX ADMIN — DEXTROSE MONOHYDRATE 50 ML: 500 INJECTION PARENTERAL at 08:15

## 2021-01-01 RX ADMIN — MORPHINE SULFATE 4 MG: 4 INJECTION INTRAVENOUS at 01:52

## 2021-01-01 RX ADMIN — PHENYLEPHRINE HYDROCHLORIDE 100 MCG: 10 INJECTION INTRAVENOUS at 15:36

## 2021-01-01 RX ADMIN — DEXTROSE MONOHYDRATE: 25 INJECTION, SOLUTION INTRAVENOUS at 09:38

## 2021-01-01 RX ADMIN — POLYETHYLENE GLYCOL 3350 17 G: 17 POWDER, FOR SOLUTION ORAL at 09:30

## 2021-01-01 RX ADMIN — PACLITAXEL 135 MG: 6 INJECTION, SOLUTION INTRAVENOUS at 12:20

## 2021-01-01 RX ADMIN — MORPHINE SULFATE 4 MG: 4 INJECTION INTRAVENOUS at 16:17

## 2021-01-01 RX ADMIN — ONDANSETRON 4 MG: 2 INJECTION INTRAMUSCULAR; INTRAVENOUS at 16:01

## 2021-01-01 RX ADMIN — METOCLOPRAMIDE 10 MG: 10 TABLET ORAL at 21:24

## 2021-01-01 RX ADMIN — SUGAMMADEX 200 MG: 100 INJECTION, SOLUTION INTRAVENOUS at 16:46

## 2021-01-01 RX ADMIN — ESMOLOL HYDROCHLORIDE 40 MG: 10 INJECTION, SOLUTION INTRAVENOUS at 15:57

## 2021-01-01 RX ADMIN — DEXTROSE MONOHYDRATE AND SODIUM CHLORIDE 100 ML/HR: 5; .9 INJECTION, SOLUTION INTRAVENOUS at 10:15

## 2021-01-01 RX ADMIN — Medication 10 ML: at 20:53

## 2021-01-01 RX ADMIN — MORPHINE SULFATE 4 MG: 4 INJECTION INTRAVENOUS at 00:26

## 2021-01-01 RX ADMIN — DEXTROSE 75 ML/HR: 10 SOLUTION INTRAVENOUS at 09:57

## 2021-01-01 RX ADMIN — SODIUM CHLORIDE 250 ML: 9 INJECTION, SOLUTION INTRAVENOUS at 10:13

## 2021-01-01 RX ADMIN — MORPHINE SULFATE 4 MG: 4 INJECTION INTRAVENOUS at 02:27

## 2021-01-01 RX ADMIN — HYDROCODONE BITARTRATE AND ACETAMINOPHEN 1 TABLET: 5; 325 TABLET ORAL at 21:18

## 2021-01-01 RX ADMIN — HYDROCODONE BITARTRATE AND ACETAMINOPHEN 1 TABLET: 5; 325 TABLET ORAL at 09:12

## 2021-01-01 RX ADMIN — MORPHINE SULFATE 2 MG: 4 INJECTION INTRAVENOUS at 10:09

## 2021-01-01 RX ADMIN — MUPIROCIN: 20 OINTMENT TOPICAL at 20:36

## 2021-01-01 RX ADMIN — MORPHINE SULFATE 4 MG: 4 INJECTION INTRAVENOUS at 06:27

## 2021-01-01 RX ADMIN — METOCLOPRAMIDE HYDROCHLORIDE 5 MG: 5 INJECTION INTRAMUSCULAR; INTRAVENOUS at 20:04

## 2021-01-01 RX ADMIN — Medication 10 ML: at 09:39

## 2021-01-01 RX ADMIN — SODIUM CHLORIDE 100 ML/HR: 9 INJECTION, SOLUTION INTRAVENOUS at 03:20

## 2021-01-01 RX ADMIN — SODIUM CHLORIDE 250 ML: 9 INJECTION, SOLUTION INTRAVENOUS at 13:32

## 2021-01-01 RX ADMIN — HYDROCODONE BITARTRATE AND ACETAMINOPHEN 1 TABLET: 5; 325 TABLET ORAL at 05:33

## 2021-01-01 RX ADMIN — MIDAZOLAM 2 MG: 1 INJECTION INTRAMUSCULAR; INTRAVENOUS at 15:14

## 2021-01-01 RX ADMIN — DEXTROSE MONOHYDRATE AND SODIUM CHLORIDE 100 ML/HR: 5; .9 INJECTION, SOLUTION INTRAVENOUS at 01:50

## 2021-01-01 RX ADMIN — HYDROCODONE BITARTRATE AND ACETAMINOPHEN 1 TABLET: 5; 325 TABLET ORAL at 01:50

## 2021-01-01 RX ADMIN — ESMOLOL HYDROCHLORIDE 30 MG: 10 INJECTION, SOLUTION INTRAVENOUS at 15:49

## 2021-01-01 RX ADMIN — FENTANYL CITRATE 50 MCG: 50 INJECTION, SOLUTION INTRAMUSCULAR; INTRAVENOUS at 15:18

## 2021-01-01 RX ADMIN — DEXTROSE MONOHYDRATE 50 ML: 500 INJECTION PARENTERAL at 08:40

## 2021-01-01 RX ADMIN — MUPIROCIN: 20 OINTMENT TOPICAL at 09:34

## 2021-01-01 RX ADMIN — SODIUM CHLORIDE 100 ML/HR: 9 INJECTION, SOLUTION INTRAVENOUS at 09:31

## 2021-01-01 RX ADMIN — HYDROCODONE BITARTRATE AND ACETAMINOPHEN 1 TABLET: 5; 325 TABLET ORAL at 15:26

## 2021-01-01 RX ADMIN — DEXTROSE MONOHYDRATE AND SODIUM CHLORIDE 100 ML/HR: 5; .9 INJECTION, SOLUTION INTRAVENOUS at 15:41

## 2021-01-01 RX ADMIN — MUPIROCIN: 20 OINTMENT TOPICAL at 09:56

## 2021-01-01 RX ADMIN — Medication 10 ML: at 20:36

## 2021-01-01 RX ADMIN — FENTANYL CITRATE 50 MCG: 50 INJECTION, SOLUTION INTRAMUSCULAR; INTRAVENOUS at 15:35

## 2021-01-01 RX ADMIN — DIPHENHYDRAMINE HYDROCHLORIDE 25 MG: 50 INJECTION, SOLUTION INTRAMUSCULAR; INTRAVENOUS at 10:20

## 2021-01-01 RX ADMIN — LIDOCAINE HYDROCHLORIDE 50 MG: 20 INJECTION, SOLUTION EPIDURAL; INFILTRATION; INTRACAUDAL; PERINEURAL at 15:18

## 2021-01-01 RX ADMIN — Medication 10 ML: at 14:35

## 2021-01-01 RX ADMIN — SODIUM CHLORIDE 750 MG: 900 INJECTION, SOLUTION INTRAVENOUS at 13:34

## 2021-01-01 RX ADMIN — HYDROCODONE BITARTRATE AND ACETAMINOPHEN 1 TABLET: 5; 325 TABLET ORAL at 21:24

## 2021-01-01 RX ADMIN — PROPOFOL 50 MG: 10 INJECTION, EMULSION INTRAVENOUS at 15:48

## 2021-01-01 RX ADMIN — METOCLOPRAMIDE 10 MG: 10 TABLET ORAL at 09:35

## 2021-01-01 RX ADMIN — PHENYLEPHRINE HYDROCHLORIDE 150 MCG: 10 INJECTION INTRAVENOUS at 15:28

## 2021-01-01 RX ADMIN — ONDANSETRON HYDROCHLORIDE 8 MG: 4 TABLET, FILM COATED ORAL at 12:53

## 2021-01-01 RX ADMIN — MORPHINE SULFATE 4 MG: 4 INJECTION INTRAVENOUS at 17:54

## 2021-01-01 RX ADMIN — MORPHINE SULFATE 4 MG: 4 INJECTION INTRAVENOUS at 09:29

## 2021-01-01 RX ADMIN — POLYETHYLENE GLYCOL 3350 17 G: 17 POWDER, FOR SOLUTION ORAL at 17:54

## 2021-01-01 RX ADMIN — METOCLOPRAMIDE 10 MG: 10 TABLET ORAL at 09:30

## 2021-01-01 RX ADMIN — Medication 10 ML: at 01:52

## 2021-01-01 RX ADMIN — HYDROCODONE BITARTRATE AND ACETAMINOPHEN 1 TABLET: 5; 325 TABLET ORAL at 06:04

## 2021-01-01 RX ADMIN — ROCURONIUM BROMIDE 10 MG: 10 INJECTION, SOLUTION INTRAVENOUS at 15:41

## 2021-01-01 RX ADMIN — FAMOTIDINE 20 MG: 10 INJECTION INTRAVENOUS at 10:16

## 2021-01-01 RX ADMIN — SODIUM CHLORIDE 100 ML/HR: 9 INJECTION, SOLUTION INTRAVENOUS at 07:00

## 2021-01-01 RX ADMIN — Medication 10 ML: at 09:56

## 2021-01-01 RX ADMIN — MUPIROCIN: 20 OINTMENT TOPICAL at 09:31

## 2021-01-01 RX ADMIN — Medication 10 ML: at 09:31

## 2021-01-01 RX ADMIN — MORPHINE SULFATE 4 MG: 4 INJECTION INTRAVENOUS at 20:36

## 2021-01-01 RX ADMIN — HYDROCODONE BITARTRATE AND ACETAMINOPHEN 1 TABLET: 5; 325 TABLET ORAL at 03:36

## 2021-01-01 RX ADMIN — SODIUM BICARBONATE 50 MEQ: 84 INJECTION, SOLUTION INTRAVENOUS at 08:50

## 2021-01-01 RX ADMIN — HYDROCODONE BITARTRATE AND ACETAMINOPHEN 1 TABLET: 5; 325 TABLET ORAL at 23:37

## 2021-01-01 RX ADMIN — HEPARIN 500 UNITS: 100 SYRINGE at 14:35

## 2021-01-01 RX ADMIN — SODIUM CHLORIDE 440 MG: 9 INJECTION, SOLUTION INTRAVENOUS at 11:05

## 2021-01-01 RX ADMIN — HYDROCODONE BITARTRATE AND ACETAMINOPHEN 1 TABLET: 5; 325 TABLET ORAL at 15:33

## 2021-01-01 RX ADMIN — METOCLOPRAMIDE 10 MG: 10 TABLET ORAL at 20:54

## 2021-01-01 RX ADMIN — METOCLOPRAMIDE HYDROCHLORIDE 5 MG: 5 INJECTION INTRAMUSCULAR; INTRAVENOUS at 16:23

## 2021-01-01 RX ADMIN — DEXAMETHASONE SODIUM PHOSPHATE 12 MG: 4 INJECTION, SOLUTION INTRA-ARTICULAR; INTRALESIONAL; INTRAMUSCULAR; INTRAVENOUS; SOFT TISSUE at 10:40

## 2021-01-01 RX ADMIN — SODIUM CHLORIDE 100 ML/HR: 9 INJECTION, SOLUTION INTRAVENOUS at 13:25

## 2021-01-01 RX ADMIN — POLYETHYLENE GLYCOL 3350 17 G: 17 POWDER, FOR SOLUTION ORAL at 20:04

## 2021-01-01 RX ADMIN — SODIUM CHLORIDE 1000 ML: 9 INJECTION, SOLUTION INTRAVENOUS at 18:16

## 2021-01-01 RX ADMIN — PROPOFOL 130 MG: 10 INJECTION, EMULSION INTRAVENOUS at 15:18

## 2021-01-01 RX ADMIN — METOCLOPRAMIDE 10 MG: 10 TABLET ORAL at 20:36

## 2021-01-01 RX ADMIN — DEXTROSE MONOHYDRATE AND SODIUM CHLORIDE 100 ML/HR: 5; .9 INJECTION, SOLUTION INTRAVENOUS at 01:42

## 2021-01-01 RX ADMIN — Medication 10 ML: at 21:26

## 2021-01-01 RX ADMIN — SUCCINYLCHOLINE CHLORIDE 120 MG: 20 INJECTION, SOLUTION INTRAMUSCULAR; INTRAVENOUS at 15:18

## 2021-01-01 RX ADMIN — MUPIROCIN: 20 OINTMENT TOPICAL at 21:24

## 2021-01-01 RX ADMIN — MUPIROCIN 1 APPLICATION: 20 OINTMENT TOPICAL at 20:10

## 2021-01-04 NOTE — TELEPHONE ENCOUNTER
Case Management/ Note    Patient Name: Tyree Orourke  YOB: 1960  MRN #: [id number]    OSW received the same paperwork from [employer] regarding patient's short term disability that was sent to them. OSW called Tyree who said he received confirmation that his short term disability was complete and he will be receiving this.     Electronically signed by:   Silvana Huynh LCSW, KAYKAY  01/04/21, 14:45 EST

## 2021-01-05 NOTE — TELEPHONE ENCOUNTER
Need a document stating how long patient is to hold Eliquis prior to placement of J-tube.     Spoke with Dr. Peters.      Order received-Pt is to hold Eliquis 2 days prior to placement of J-tube.

## 2021-01-13 NOTE — PROGRESS NOTES
Pt. Here at clinic for C2D1 Cyramza, Taxol  Pt. Has no complaints today and his lab results are within parameters for treatment.   Pt. Stated he is getting a feeding tube placed on Monday 1/18/2021. MD notified of pt's status  Orders given OK to continue with treatment today per Dr. Peters

## 2021-01-15 NOTE — TELEPHONE ENCOUNTER
I returned call to pt to advise him that he will need to be quarantined for 20 days starting today since he tested positive for COVID. I will cancel current appointments. Pt verbalized understanding to call office on 2/3 to give update an get appointments rescheduled.

## 2021-01-15 NOTE — TELEPHONE ENCOUNTER
Caller: DARRYL AWAN    Relationship to patient: SELF    Best call back number: [phone]    PT CALLED TO INFORM DR HUNTER THAT HE JUST TESTED POSITIVE FOR COVID

## 2021-01-15 NOTE — TELEPHONE ENCOUNTER
I called [facility] and spoke to Ana Garduno RD, regarding the pt's feeding tube placement scheduled for 1/18/21. I called to confirm the pt will get the TF supplies and education he needs. Ana said they are checking his insurance to see that nothing has changed since the new year. I will continue to check on the pt and communicate with [facility] to assure the pt gets the proper care and feedings he needs.    Karlie Langford, MS,RD,LD-[place],CD-[place]  Registered Dietitian

## 2021-01-21 NOTE — TELEPHONE ENCOUNTER
I called the patient to inform him that his hydrocodone refill has been sent to his pharmacy as requested. He verbalized understanding.

## 2021-01-25 PROBLEM — R53.1 WEAKNESS: Status: ACTIVE | Noted: 2021-01-01

## 2021-01-25 NOTE — TELEPHONE ENCOUNTER
Pt tested positive for COVID-19 on 1/15.  How long does he need to wait to get r/s for fluids?    He does not have any symptoms of COVID-19, but is lacking energy. He believes this is due to not having his fluids.      Please call him asap to let him know  [phone]

## 2021-01-25 NOTE — TELEPHONE ENCOUNTER
I returned call to pt. He states that he has covid an is wanting to know how long until he can reschedule his appointments. I advised him 20 days from testing positive. This date would be 2/4. I advised pt to return call to office 2/3 to reschedule appointments and update with symptoms, pt verbalized understanding.

## 2021-01-26 PROBLEM — E43 SEVERE MALNUTRITION (HCC): Status: ACTIVE | Noted: 2021-01-01

## 2021-01-27 NOTE — TELEPHONE ENCOUNTER
I returned , Carol Naegele's call to confirm the pt has been set up to receive his feeding tube supplies and education from [employer]. I provided Sarah with their phone number and contacts.     Karlie Langford MS,RD,LD-[place],CD-[place]  Registered Dietitian

## 2021-01-27 NOTE — ANESTHESIA POSTPROCEDURE EVALUATION
Patient: Tyree Orourke    Procedure Summary     Date: 01/27/21 Room / Location: [facility] OR 09 / [facility] MAIN OR    Anesthesia Start: 1511 Anesthesia Stop: 1656    Procedure: GASTROSTOMY FEEDING TUBE INSERTION (N/A ) Diagnosis:       Malignant neoplasm of esophagus, unspecified location (CMS/HCC)      (Malignant neoplasm of esophagus, unspecified location (CMS/HCC) [C15.9])    Surgeon: Chapo Bundy MD Provider: Kwesi Us MD    Anesthesia Type: general ASA Status: 3          Anesthesia Type: general    Vitals  Vitals Value Taken Time   /105 01/27/21 1743   Temp 97.4 °F (36.3 °C) 01/27/21 1742   Pulse 77 01/27/21 1744   Resp 14 01/27/21 1742   SpO2 100 % 01/27/21 1744   Vitals shown include unvalidated device data.        Post Anesthesia Care and Evaluation    Patient location during evaluation: PACU  Patient participation: complete - patient participated  Level of consciousness: awake  Pain scale: See nurse's notes for pain score.  Pain management: adequate  Airway patency: patent  Anesthetic complications: No anesthetic complications  PONV Status: none  Cardiovascular status: acceptable  Respiratory status: acceptable  Hydration status: acceptable    Comments: Patient seen and examined postoperatively; vital signs stable; SpO2 greater than or equal to 90%; cardiopulmonary status stable; nausea/vomiting adequately controlled; pain adequately controlled; no apparent anesthesia complications; patient discharged from anesthesia care when discharge criteria were met

## 2021-01-27 NOTE — ANESTHESIA PROCEDURE NOTES
Airway  Urgency: elective    Date/Time: 1/27/2021 3:19 PM  Airway not difficult    General Information and Staff    Patient location during procedure: OR    Indications and Patient Condition  Indications for airway management: airway protection    Preoxygenated: yes  MILS maintained throughout  Mask difficulty assessment: 0 - not attempted    Final Airway Details  Final airway type: endotracheal airway      Successful airway: ETT  Cuffed: yes   Successful intubation technique: direct laryngoscopy  Facilitating devices/methods: intubating stylet  Endotracheal tube insertion site: oral  Blade: Eugenio  Blade size: 4  ETT size (mm): 7.5  Cormack-Lehane Classification: grade I - full view of glottis  Placement verified by: chest auscultation and capnometry   Measured from: lips  ETT/EBT  to lips (cm): 21  Number of attempts at approach: 1  Assessment: lips, teeth, and gum same as pre-op and atraumatic intubation    Additional Comments  RSI

## 2021-01-27 NOTE — ANESTHESIA PREPROCEDURE EVALUATION
Anesthesia Evaluation     NPO Solid Status: > 8 hours  NPO Liquid Status: > 8 hours           Airway   Mallampati: II  TM distance: >3 FB  No difficulty expected  Dental    (+) upper dentures    Pulmonary    Cardiovascular   Exercise tolerance: good (4-7 METS)    (+) hypertension, hyperlipidemia,       Neuro/Psych  (+) weakness,     GI/Hepatic/Renal/Endo    (+)  GERD,      Musculoskeletal     Abdominal    Substance History      OB/GYN          Other      history of cancer    ROS/Med Hx Other: Esophageal cancer s/p chemo                 Anesthesia Plan    ASA 3     general     intravenous induction     Anesthetic plan, all risks, benefits, and alternatives have been provided, discussed and informed consent has been obtained with: patient.

## 2021-01-31 NOTE — OUTREACH NOTE
Prep Survey      Responses   [facility] facility patient discharged from?  [facility]   Is LACE score < 7 ?  No   Emergency Room discharge w/ pulse ox?  No   Eligibility  Readm Mgmt   Discharge diagnosis  PEG tube placement hx of malnutrition due to gastroesophageal tumor [covid positive-asymptomatic]   Does the patient have one of the following disease processes/diagnoses(primary or secondary)?  General Surgery   Does the patient have Home health ordered?  No   Is there a DME ordered?  No   Prep survey completed?  Yes          Bessy Alvares RN

## 2021-02-01 ENCOUNTER — TELEPHONE (OUTPATIENT)
Dept: ONCOLOGY | Facility: CLINIC | Age: 61
End: 2021-02-01

## 2021-02-01 NOTE — PROGRESS NOTES
Discharge Planning Assessment   [facility]     Patient Name: Tyree Orourke  MRN: [id number]  Today's Date: 2021    Admit Date: 2021          Plan    Final Discharge Disposition Code  41 -  in medical facility    Final Note   in hospital       Carol naegele rn  Case management  Office number [phone]  Cell phone [phone]

## 2021-02-01 NOTE — TELEPHONE ENCOUNTER
I called to check on the pt and the pt's wife informed me that he passed away suddenly over the weekend. I offered my condolences and empathy to his wife.    Karlie Langford MS,RD,LD-[place],CD-[place]  Registered Dietitian

## 2021-02-03 ENCOUNTER — READMISSION MANAGEMENT (OUTPATIENT)
Dept: CALL CENTER | Facility: HOSPITAL | Age: 61
End: 2021-02-03

## 2021-02-03 NOTE — OUTREACH NOTE
Prep Survey      Responses   [facility] facility patient discharged from?  [facility]   Does the patient have one of the following disease processes/diagnoses(primary or secondary)?  General Surgery          Bessy Alvares RN

## 2021-02-15 NOTE — DISCHARGE SUMMARY
[facility] Medicine Services  DISCHARGE SUMMARY        Prepared For PCP:  Glenys Brewster MD    Patient Name: Tyree Orourke  : 1960  MRN: [id number]      Date of Admission:   2021    Date of Discharge:  2/15/2021    Length of stay:  LOS: 2 days     Hospital Course     Presenting Problem:   Malignant neoplasm of lower third of esophagus (CMS/HCC) [C15.5]  Weakness [R53.1]  Weakness [R53.1]  Weakness [R53.1]      Active Hospital Problems    Diagnosis  POA   • **Malignant neoplasm of esophagus (CMS/HCC) [C15.9]  Yes   • Severe malnutrition (CMS/HCC) [E43]  Yes   • Weakness [R53.1]  Yes      Resolved Hospital Problems   No resolved problems to display.           Hospital Course by problem list.    Assessment:  1.  Cachexia and severe malnutrition 2/2 #2.status post Surgical Peg tube placement, will be started on Peg tube feeding today. Advised to hold eliquis for now.     2.  Gastric/distal esophageal cancer-treated with chemotherapy.     3.  Mild dehydration.     4.  GERD.     5.  Asymptomatic COVID-19 infection diagnosed 1/15/2021.    PT DNR comfort care / Hospice care. Pt  while getting DNR comfort care.             Recommendation for Outpatient Providers:             Reasons For Change In Medications and Indications for New Medications:        Day of Discharge     HPI:       Vital Signs:         Physical Exam:  Physical Exam    Pertinent  and/or Most Recent Results               Invalid input(s): PROT, LABALBU        Invalid input(s): TG, LDLCALC, LDLREALC        Brief Urine Lab Results  (Last result in the past 365 days)      Color   Clarity   Blood   Leuk Est   Nitrite   Protein   CREAT   Urine HCG        21 0805 Dark Yellow Slightly Cloudy Negative Negative Negative 30 mg/dL (1+)               Microbiology Results Abnormal     None          Xr Chest 1 View    Result Date: 2021  Impression: 1. No evidence of active disease.  Electronically Signed By-Connie  MD Jose On:2021 6:50 PM This report was finalized on [id number] by  Connie Garcia MD.                             Test Results Pending at Discharge        Procedures Performed  Procedure(s):  GASTROSTOMY FEEDING TUBE INSERTION         Consults:   Consults     Date and Time Order Name Status Description    2021 Inpatient General Surgery Consult Completed     2021 2016 Hospitalist (on-call MD unless specified) Completed             Discharge Details        Discharge Medications      Changes to Medications      Instructions Start Date   apixaban 5 MG tablet tablet  Commonly known as: ELIQUIS  What changed: when to take this   5 mg, Oral, Every 12 Hours Scheduled      metoclopramide 5 MG tablet  Commonly known as: REGLAN  What changed: additional instructions   10 mg, Oral, 2 times daily      metoclopramide 5 MG tablet  Commonly known as: Reglan  What changed: You were already taking a medication with the same name, and this prescription was added. Make sure you understand how and when to take each.   5 mg, Oral, 3 Times Daily      ondansetron 8 MG tablet  Commonly known as: ZOFRAN  What changed: additional instructions   8 mg, Oral, 3 Times Daily PRN      sucralfate 1 GM/10ML suspension  Commonly known as: CARAFATE  What changed: additional instructions   1 g, Oral, 4 Times Daily With Meals & Nightly         Continue These Medications      Instructions Start Date   HYDROcodone-acetaminophen 5-325 MG per tablet  Commonly known as: NORCO   1 tablet, Oral, Every 6 Hours PRN             No Known Allergies      Discharge Disposition:      Diet:  Hospital:No active diet order        Discharge Activity:         CODE STATUS:    Code Status and Medical Interventions:   Ordered at: 21 0914     Level Of Support Discussed With:    Next of Kin (If No Surrogate)     Code Status:    No CPR     Medical Interventions (Level of Support Prior to Arrest):    Comfort Measures         Follow-up  Appointments  No future appointments.          Condition on Discharge:      Stable    Do not bill      Time: I spent  00  minutes on this discharge activity which included face-to-face encounter with the patient/reviewing the data in the system/coordination of the care with the nursing staff as well as consultants/documentation/entering orders.

## (undated) DEVICE — SPNG LAP 18X18IN LF STRL PK/5

## (undated) DEVICE — SUT PDS 1 TP1 48IN Z880G BX/12

## (undated) DEVICE — CUFF SCD HEMOFORCE SEQ CALF STD MD

## (undated) DEVICE — SUT SILK 0 SH 30IN K834H

## (undated) DEVICE — DRSNG WND BORDR/ADHS NONADHR/GZ LF 4X4IN STRL

## (undated) DEVICE — SLV SCD CALF HEMOFORCE DVT THERP REPROC MD

## (undated) DEVICE — PK PROC TURNOVER

## (undated) DEVICE — PK MINOR LAPAROTOMY 50

## (undated) DEVICE — SUT VIC 3/0 SH 27IN J416H

## (undated) DEVICE — PLUG,CATHETER,DRAINAGE PROTECTOR,TUBE: Brand: MEDLINE

## (undated) DEVICE — PROXIMATE RH ROTATING HEAD SKIN STAPLERS (35 WIDE) CONTAINS 35 STAINLESS STEEL STAPLES: Brand: PROXIMATE

## (undated) DEVICE — SOL IRRIG H2O 1000ML STRL

## (undated) DEVICE — SUT SILK 2/0 SH CR8 18IN CR8 C012D

## (undated) DEVICE — DRAPE,CHEST,FENES,15X10,STERIL: Brand: MEDLINE

## (undated) DEVICE — 3M™ STERI-STRIP™ REINFORCED ADHESIVE SKIN CLOSURES, R1547, 1/2 IN X 4 IN (12 MM X 100 MM), 6 STRIPS/ENVELOPE: Brand: 3M™ STERI-STRIP™

## (undated) DEVICE — GLV SURG BIOGEL SENSR LTX PF SZ7.5

## (undated) DEVICE — NDL HYPO PRECISIONGLIDE/REG 18G 1IN PNK

## (undated) DEVICE — C-ARM: Brand: UNBRANDED

## (undated) DEVICE — PK ENDO GI 50

## (undated) DEVICE — SYR LL TP 10ML STRL

## (undated) DEVICE — DRSNG WND GZ PAD BORDERED 4X8IN STRL

## (undated) DEVICE — KT SURG TURNOVER 050

## (undated) DEVICE — Device

## (undated) DEVICE — GAUZE,SPONGE,4"X4",16PLY,XRAY,STRL,LF: Brand: MEDLINE

## (undated) DEVICE — UNDYED BRAIDED (POLYGLACTIN 910), SYNTHETIC ABSORBABLE SUTURE: Brand: COATED VICRYL

## (undated) DEVICE — ADHS LIQ MASTISOL 2/3ML

## (undated) DEVICE — ADAPTER,CATHETER/SYRINGE/LUER,STERILE: Brand: MEDLINE

## (undated) DEVICE — GLV SURG SIGNATURE ESSENTIAL PF LTX SZ8.5

## (undated) DEVICE — BALN ENDO FOR EG/3630UR

## (undated) DEVICE — PAPR PRNT PK SONY W RIBN UPC55

## (undated) DEVICE — BITEBLOCK ENDO W/STRAP 60F A/ LF DISP